# Patient Record
Sex: FEMALE | Race: BLACK OR AFRICAN AMERICAN | Employment: OTHER | URBAN - METROPOLITAN AREA
[De-identification: names, ages, dates, MRNs, and addresses within clinical notes are randomized per-mention and may not be internally consistent; named-entity substitution may affect disease eponyms.]

---

## 2017-08-02 ENCOUNTER — OFFICE VISIT (OUTPATIENT)
Dept: SURGERY | Age: 57
End: 2017-08-02

## 2017-08-02 VITALS
SYSTOLIC BLOOD PRESSURE: 136 MMHG | DIASTOLIC BLOOD PRESSURE: 86 MMHG | WEIGHT: 211 LBS | HEART RATE: 74 BPM | HEIGHT: 64 IN | RESPIRATION RATE: 16 BRPM | BODY MASS INDEX: 36.02 KG/M2 | OXYGEN SATURATION: 100 %

## 2017-08-02 DIAGNOSIS — K90.9 INTESTINAL MALABSORPTION, UNSPECIFIED TYPE: ICD-10-CM

## 2017-08-02 DIAGNOSIS — Z87.891 HX OF SMOKING: ICD-10-CM

## 2017-08-02 DIAGNOSIS — I10 ESSENTIAL HYPERTENSION: ICD-10-CM

## 2017-08-02 DIAGNOSIS — I25.119 CORONARY ARTERY DISEASE INVOLVING NATIVE HEART WITH ANGINA PECTORIS, UNSPECIFIED VESSEL OR LESION TYPE (HCC): ICD-10-CM

## 2017-08-02 DIAGNOSIS — E66.01 SEVERE OBESITY (BMI 35.0-39.9) WITH COMORBIDITY (HCC): ICD-10-CM

## 2017-08-02 DIAGNOSIS — E78.00 HYPERCHOLESTEREMIA: ICD-10-CM

## 2017-08-02 DIAGNOSIS — E66.01 MORBID OBESITY DUE TO EXCESS CALORIES (HCC): Primary | ICD-10-CM

## 2017-08-02 RX ORDER — CARVEDILOL PHOSPHATE 10 MG/1
CAPSULE, EXTENDED RELEASE ORAL
COMMUNITY
End: 2019-10-08

## 2017-08-02 RX ORDER — NAPROXEN 500 MG/1
500 TABLET ORAL 2 TIMES DAILY WITH MEALS
COMMUNITY
End: 2021-11-10

## 2017-08-02 RX ORDER — DEXLANSOPRAZOLE 30 MG/1
30 CAPSULE, DELAYED RELEASE ORAL
COMMUNITY
End: 2017-11-15

## 2017-08-02 RX ORDER — HYDROXYZINE 25 MG/1
25 TABLET, FILM COATED ORAL
COMMUNITY

## 2017-08-02 NOTE — PROGRESS NOTES
Revision Surgery Consultation    Subjective: The patient is a 62 y.o. obese female with a Body mass index is 36.22 kg/(m^2). Keagan Tai The patient had a Sleeve gastrectomy procedure done approximatly 4 years ago in [facility] by me.  her starting weight prior to surgery was 250. she ultimately lost approximately 60 lbs with a subsequent weight regain of 40lbs. her peak EBWL at 2 years out from surgery was 49% and now her current EBWL is 33%. her last bariatric follow-up was 3 years ago with [facility] Mana Tucker notes that she had no issues in the immediate post-op phase and had no hospital readmissions in the remote post-op phase. she currently is having the following issues related to his health:Weight regain. she is here today to discuss a possible revision of her surgery. All of their prior evaluations available by both their PCP's and specialists physicians have been reviewed today either in the Care Everywhere portal or scanned under the media tab. I have spent a large portion of my initial consultation today reviewing the patients current dietary habits which have contributed to their health issues, weight regain and  their current obesity. They understand that generally speaking,  weight regain is  a function of resuming less that ideal dietary habits instead of being a procedural issue. They understand that older procedures are more likely to be associated with a less that perfect procedural result, such as a prior vertical banded gastroplasty or non divided gastric bypass. These procedures are more likely to result in staple line failures with resultant weight regain. This has been explained to the patient via diagrams of these older procedures and given to the patient. I have suggested to them personally a dietary regimen that they can initiate now to help with their status as it pertains to their weight.   They understand that the most important aspect of their journey through their weight loss endeavor will be their adherence to a new lifestyle of healthy eating behavior. They also understand that an adherence to an exercise program will not only help with weight loss but is ultimately important in weight maintenance.       Patient Active Problem List    Diagnosis Date Noted    UTI (lower urinary tract infection) 2015    Osteoarthritis 2015    Hypokalemia 2015    Urinary retention 2015    [facility]racture of distal femur ([facility] [address] 2015    Intestinal malabsorption 2014    H. pylori infection 2014    Chronic gastritis 2014    Morbid obesity ([facility] [address]     Hypertension     Hypercholesteremia     Hx of smoking     Migraine     Depression     Arthritic-like pain     CAD (coronary artery disease)     Preop cardiovascular exam 2011    Abnormal EKG 2011      Past Surgical History:   Procedure Laterality Date    DELIVERY       HX CHOLECYSTECTOMY      laparoscopic    HX CYST INCISION AND DRAINAGE      HX FEMUR FRACTURE TX      HX GI  2013    sleeve resection    HX KNEE ARTHROSCOPY      right    HX KNEE REPLACEMENT      bilateral    HX ORTHOPAEDIC      left knee revision      Social History   Substance Use Topics    Smoking status: Former Smoker     Packs/day: 0.25     Years: 35.00     Types: Cigarettes     Quit date: 1/10/2017    Smokeless tobacco: Never Used      Comment: states she smokes e-cigarettes    Alcohol use 0.5 oz/week     1 Standard drinks or equivalent per week      Family History   Problem Relation Age of Onset    Heart Disease Mother      Mom was told she had irreg heart beat/hole in [place], [place] Breast Cancer Mother     Other Other      Non- contributory    Breast Cancer Maternal Grandmother     Malignant Hyperthermia Neg Hx     Pseudocholinesterase Deficiency Neg Hx     Delayed Awakening Neg Hx     Post-op Nausea/Vomiting Neg Hx     Emergence Delirium Neg Hx     Post-op Cognitive Dysfunction Neg Hx       Current Outpatient Prescriptions   Medication Sig Dispense Refill    dexlansoprazole (DEXILANT) 30 mg capsule Take 30 mg by mouth Daily (before breakfast).  carvedilol (COREG CR) 10 mg CR capsule Take  by mouth daily (with breakfast).  hydrOXYzine HCl (ATARAX) 25 mg tablet Take  by mouth three (3) times daily as needed for Itching.  naproxen (NAPROSYN) 500 mg tablet Take 500 mg by mouth two (2) times daily (with meals).  brexpiprazole (REXULTI) 2 mg tab tablet Take  by mouth daily.  AMITRIPTYLINE HCL (AMITRIPTYLINE PO) Take  by mouth.  mometasone (NASONEX) 50 mcg/actuation nasal spray 2 Sprays daily.  vilazodone (VIIBRYD) 40 mg tab tablet Take  by mouth daily.  gabapentin (NEURONTIN) 300 mg capsule Take 1 Cap by mouth three (3) times daily. 90 Cap 0    melatonin (MELATONIN) 5 mg cap capsule Take 10 mg by mouth nightly.  amLODIPine (NORVASC) 5 mg tablet Take 1 Tab by mouth daily. 60 Tab 0    methocarbamol (ROBAXIN) 500 mg tablet Take 1 Tab by mouth four (4) times daily. 20 Tab 0    lurasidone (LATUDA) 120 mg tab tablet Take  by mouth.  traZODone (DESYREL) 100 mg tablet Take 100 mg by mouth nightly.        Allergies   Allergen Reactions    Percocet [Oxycodone-Acetaminophen] Itching          Review of Systems:            General - No history or complaints of unexpected fever, chills, or weight loss  Head/Neck - No history or complaints of headache, diplopia, dysphagia, hearing loss  Cardiac - No history or complaints of chest pain, palpitations, murmur, or shortness of breath  Pulmonary - No history or complaints of shortness of breath, productive cough, hemoptysis  Gastrointestinal - No history or complaints of reflux,  abdominal pain, obstipation/constipation, blood per rectum  Genitourinary - No history or complaints of hematuria/dysuria, stress urinary incontinence symptoms, or renal lithiasis  Musculoskeletal - No history or complaints of joint pain or muscular weakness  Hematologic - No history or complaints of bleeding disorders, blood transfusions, sickle cell anemia  Neurologic - No history or complaints of  migraine headaches, seizure activity, syncopal episodes, TIA or stroke  Integumentary - No history or complaints of rashes, abnormal nevi, skin cancer  Gynecological - No history of heavy menses/abnormal menses           Objective:     Visit Vitals    /86 (BP 1 Location: Left arm, BP Patient Position: Sitting)    Pulse 74    Resp 16    Ht 5' 4\" (1.626 m)    Wt 95.7 kg (211 lb)    SpO2 100%    BMI 36.22 kg/m2       Physical Examination: General appearance - alert, well appearing, and in no distress and oriented to person, place, and time  Mental status - alert, oriented to person, place, and time, normal mood, behavior, speech, dress, motor activity, and thought processes  Eyes - left eye normal, right eye normal  Ears - right ear normal, left ear normal  Nose - normal and patent, no erythema, discharge or polyps  Mouth - mucous membranes moist, pharynx normal without lesions  Neck - supple, no significant adenopathy  Lymphatics - no palpable lymphadenopathy, no hepatosplenomegaly  Chest - clear to auscultation, no wheezes, rales or rhonchi, symmetric air entry  Heart - normal rate, regular rhythm, normal S1, S2, no murmurs, rubs, clicks or gallops  Abdomen - soft, nontender, nondistended, no masses or organomegaly  Back exam - full range of motion, no tenderness, palpable spasm or pain on motion  Neurological - alert, oriented, normal speech, no focal findings or movement disorder noted  Musculoskeletal - no joint tenderness, deformity or swelling  Extremities - peripheral pulses normal, no pedal edema, no clubbing or cyanosis  Skin - normal coloration and turgor, no rashes, no suspicious skin lesions noted    Labs / Old Records:     Lab Results   Component Value Date/Time WBC 4.4 08/31/2016 10:16 PM    HGB 11.4 08/31/2016 10:16 PM    HCT 33.6 08/31/2016 10:16 PM    PLATELET 644 58/97/0607 10:16 PM    MCV 80.6 08/31/2016 10:16 PM     Lab Results   Component Value Date/Time    Sodium 142 08/31/2016 10:16 PM    Potassium 3.3 08/31/2016 10:16 PM    Chloride 106 08/31/2016 10:16 PM    CO2 31 08/31/2016 10:16 PM    Anion gap 5 08/31/2016 10:16 PM    Glucose 100 08/31/2016 10:16 PM    BUN 12 08/31/2016 10:16 PM    Creatinine 0.60 08/31/2016 10:16 PM    BUN/Creatinine ratio 20 08/31/2016 10:16 PM    GFR est AA >60 08/31/2016 10:16 PM    GFR est non-AA >60 08/31/2016 10:16 PM    Calcium 8.2 08/31/2016 10:16 PM    Bilirubin, total 0.2 08/31/2016 10:16 PM    AST (SGOT) 13 08/31/2016 10:16 PM    Alk. phosphatase 56 08/31/2016 10:16 PM    Protein, total 7.1 08/31/2016 10:16 PM    Albumin 3.2 08/31/2016 10:16 PM    Globulin 3.9 08/31/2016 10:16 PM    A-G Ratio 0.8 08/31/2016 10:16 PM    ALT (SGPT) 14 08/31/2016 10:16 PM     No results found for: IRON, FE, TIBC, IBCT, PSAT, FERR  No results found for: FOL, RBCF  Lab Results   Component Value Date/Time    Vitamin D 25-Hydroxy 16.3 05/27/2016 12:09 PM             Old operative reports reviewed if available and are scanned under the media tab or reviewed under Care Everywhere        Assessment:     Morbid obesity status post   Sleeve gastrectomy procedure approximately 4.5 years ago with complaint of weight regain. Plan: 1. Weight regain-Today in our office I had a lengthy discussion with Jose Roberto Banks regarding the nature of their prior procedure. We discussed the anatomical changes to their anatomy and how this relates to  contributing weight regain. Our office will continue to attempt to obtain any medical records related to their procedure if we were not able to obtain theme today.   It was also discussed today that before any decisions can be made regarding a possible revision of their initial  procedure that an upper GI swallow study must be obtained to evaluate their post surgical anatomy. They understand that the majority of bariatric patients are not revision candidates due to appropriate post surgical anatomy that can not be improved upon. They understand also that if their initial procedure was performed via an open technique that this alone complicates their situation immensely. They understand, as I have explained today, that the adhesive disease associated with prior open procedures is at times a rate limiting factor. This precludes our ability to perform a revision procedure safely. The factors that contribute to this are increased risk such as age, health issues and increased risk from a procedural standpoint and have been discussed today. We will proceed with the UGI swallow study as described above. The patient understands all of the above and wishes to proceed with the study. 2.Nutrition-  I have discussed in detail the pitfalls in diet that have contributed to their weight regain and the importance of adhering to a lifelong regimen of dietary goals and proper eating habits. I have discussed the proper lifelong bariatric diet  in detail spending in excess of 20 minutes discussing this. We will schedule them for a dietary consultation with our nutritionist and urge them to continue on a regular follow-up schedule with her. 3.Maintenance vitamins- Today we have discussed the importance of vitamins as it pertains to their procedure and we will obtain appropriate lab to check all levels. They have been provided a handout regarding this today. Total time spent with the patient reviewing their complex history of bariatric surgery,diet, and plan is in excess of 60 minutes.       Secondary Diagnoses:         Signed By: Mel Beyer MD     August 2, 2017

## 2017-08-02 NOTE — PATIENT INSTRUCTIONS
Body Mass Index: Care Instructions  Your Care Instructions    Body mass index (BMI) can help you see if your weight is raising your risk for health problems. It uses a formula to compare how much you weigh with how tall you are. · A BMI lower than 18.5 is considered underweight. · A BMI between 18.5 and 24.9 is considered healthy. · A BMI between 25 and 29.9 is considered overweight. A BMI of 30 or higher is considered obese. If your BMI is in the normal range, it means that you have a lower risk for weight-related health problems. If your BMI is in the overweight or obese range, you may be at increased risk for weight-related health problems, such as high blood pressure, heart disease, stroke, arthritis or joint pain, and diabetes. If your BMI is in the underweight range, you may be at increased risk for health problems such as fatigue, lower protection (immunity) against illness, muscle loss, bone loss, hair loss, and hormone problems. BMI is just one measure of your risk for weight-related health problems. You may be at higher risk for health problems if you are not active, you eat an unhealthy diet, or you drink too much alcohol or use tobacco products. Follow-up care is a key part of your treatment and safety. Be sure to make and go to all appointments, and call your doctor if you are having problems. It's also a good idea to know your test results and keep a list of the medicines you take. How can you care for yourself at home? · Practice healthy eating habits. This includes eating plenty of fruits, vegetables, whole grains, lean protein, and low-fat dairy. · If your doctor recommends it, get more exercise. Walking is a good choice. Bit by bit, increase the amount you walk every day. Try for at least 30 minutes on most days of the week. · Do not smoke. Smoking can increase your risk for health problems. If you need help quitting, talk to your doctor about stop-smoking programs and medicines. These can increase your chances of quitting for good. · Limit alcohol to 2 drinks a day for men and 1 drink a day for women. Too much alcohol can cause health problems. If you have a BMI higher than 25  · Your doctor may do other tests to check your risk for weight-related health problems. This may include measuring the distance around your waist. A waist measurement of more than 40 inches in men or 35 inches in women can increase the risk of weight-related health problems. · Talk with your doctor about steps you can take to stay healthy or improve your health. You may need to make lifestyle changes to lose weight and stay healthy, such as changing your diet and getting regular exercise. If you have a BMI lower than 18.5  · Your doctor may do other tests to check your risk for health problems. · Talk with your doctor about steps you can take to stay healthy or improve your health. You may need to make lifestyle changes to gain or maintain weight and stay healthy, such as getting more healthy foods in your diet and doing exercises to build muscle. Where can you learn more? Go to http://alok-shashank.info/. Enter S176 in the search box to learn more about \"Body Mass Index: Care Instructions. \"  Current as of: January 23, 2017  Content Version: 11.3  © 8299-9670 [employer]. Care instructions adapted under license by [employer] (which disclaims liability or warranty for this information). If you have questions about a medical condition or this instruction, always ask your healthcare professional. [place] [address] any warranty or liability for your use of this information. Patient Instructions      1. Continue to monitor carbohydrate and protein intake- remember to keep your           total  carbohydrates to 50 grams or less per day for best results.   2. Remember hydration goals - usually 48 to 64 ounces of liquids per day  3. Continue to work towards exercise goals - minimum 3 days per week of 45          minutes to  1 hour at a time. 4. Remember to take vitamins as directed        Supplement Resource Guide    Importance of Protein:   Maintains lean body mass, produces antibodies to fight off infections, heals wounds, minimizes hair loss, helps to give you energy, helps with satiety, and keeping you full between meals. Importance of Calcium:  Needed for healthy bones and teeth, normal blood clotting, and nervous system functioning, higher risk of osteoporosis and bone disease with non-compliance. Importance of Multivitamins: Many functions. Supply you with extra nutrients that you may be missing from food. May lead to iron deficiency anemia, weakness, fatigue, and many other symptoms with non-compliance. Importance of B Vitamins:  Important for red blood cell formation, metabolism, energy, and helps to maintain a healthy nervous system. Protein Supplement  Find one you like now. Use immediately after surgery. Look for:  35-50g protein each day from your protein supplement once you reach the progression diet. 0-3 g fat per serving  0-3 g sugar per serving    Protein drinks should be split in separate dosages. Recommend: Lifelong  1 year + Calcium Supplement:     Start taking within a month after surgery. Look for: Calcium Citrate Plus D (1500 mg per day)  Recommend: Citracal     .          Avoid chocolate chewable calcium. Can use chewable bariatric or [employer] brand or similar chewable. The body cannot absorb more than 500-600 mg @ a time. Take for Life Multi-vitamin Supplement:      1st Month After Surgery: Any complete chewable, such as: Eries Complete chewables. Avoid Erie sours or gummies. They lack iron and other important nutrients and also have added sugar.     Continue with chewable vitamin or change to adult complete multivitamin one month after surgery. Menstruating women can take a prenatal vitamin. Make sure has at least 18 mg iron and [id number] mcg folic acid):    Vitamin B12, B Complex Vitamin, and Biotin  Start taking within a month after surgery. Vitamin B12:  1000 mcg of Vitamin B12 three times weekly    Must take sublingually (meaning you take it under your tongue) or in a liquid drop form for easy absorption. B Complex Vitamin: Take a pill or liquid drop form once daily. Biotin: This vitamin can help prevent hair loss.     Recommend 5mg   (5000 mcg) a day  Biotin is Optional

## 2017-08-02 NOTE — MR AVS SNAPSHOT
Visit Information Date & Time Provider Department Dept. Phone Encounter #  
 8/2/2017 11:30 AM Leanna Sher MD [facility] [phone] [id number] Follow-up Instructions Follow-up and Disposition History Your Appointments 8/14/2017  8:30 AM  
NUTRITION COUNSELING with TSS NUTR VISIT2 [facility] [place] ([address]) Appt Note: 1:6  
 [facility] [address] [facility] [address]  
[phone]  
  
   
 [address] [place] [address]  
  
    
 9/13/2017  1:30 PM  
NUTRITION COUNSELING with TSS NUTR VISIT2 [facility] ([address]) Appt Note: 2:6  
 [facility] [address] [facility] [address]  
[phone]  
  
    
 10/16/2017 10:30 AM  
NUTRITION COUNSELING with TSS NUTR VISIT2 [facility] ([address]) Appt Note: 3:6  
 [facility] [address] [facility] [address]  
[phone]  
  
    
 11/13/2017 10:30 AM  
NUTRITION COUNSELING with TSS NUTR VISIT2 [facility] ([address]) Appt Note: 4:6  
 [facility] [address] [facility] [address]  
[phone]  
  
    
 12/11/2017 10:30 AM  
NUTRITION COUNSELING with TSS NUTR VISIT2 [facility] ([address]) Appt Note: 5:6  
 [facility] [address] [facility]  
[phone] Upcoming Health Maintenance Date Due Hepatitis C Screening 1960 Pneumococcal 19-64 Medium Risk (1 of 1 - PPSV23) 1/26/1979 DTaP/Tdap/Td series (1 - Tdap) 1/26/1981 PAP AKA CERVICAL CYTOLOGY 1/26/1981 FOBT Q 1 YEAR AGE 50-75 1/26/2010 BREAST CANCER SCRN MAMMOGRAM 9/3/2016 INFLUENZA AGE 9 TO ADULT 8/1/2017 Allergies as of 8/2/2017  Review Complete On: 8/2/2017 By: Leanna Sher MD  
  
 Severity Noted Reaction Type Reactions Percocet [Oxycodone-acetaminophen]  06/10/2013    Itching Current Immunizations  Never Reviewed No immunizations on file. Not reviewed this visit You Were Diagnosed With   
  
 Codes Comments Morbid obesity due to excess calories ([facility]    -  Primary ICD-10-CM: E66.01 
ICD-9-CM: 278.01 Intestinal malabsorption, unspecified type     ICD-10-CM: K90.9 ICD-9-CM: 579.9 Essential hypertension     ICD-10-CM: I10 
ICD-9-CM: 401.9 Hypercholesteremia     ICD-10-CM: E78.00 ICD-9-CM: 272.0 Coronary artery disease involving native heart with angina pectoris, unspecified vessel or lesion type [facility])     ICD-10-CM: I25.119 ICD-9-CM: 414.01, 413.9 Hx of smoking     ICD-10-CM: Z87.891 ICD-9-CM: V15.82 Severe obesity (BMI 35.0-39. 9) with comorbidity ([facility]     ICD-10-CM: E66.01 
ICD-9-CM: 278.01 Vitals BP Pulse Resp Height(growth percentile) Weight(growth percentile) SpO2  
 136/86 (BP 1 Location: Left arm, BP Patient Position: Sitting) 74 16 5' 4\" (1.626 m) 211 lb (95.7 kg) 100% BMI OB Status Smoking Status 36.22 kg/m2 Menopause Former Smoker Vitals History BMI and BSA Data Body Mass Index Body Surface Area  
 36.22 kg/m 2 2.08 m 2 Preferred Pharmacy Pharmacy Name Phone [id number]- [place], [address] [place] [address] Your Updated Medication List  
  
   
This list is accurate as of: 8/2/17 12:42 PM.  Always use your most recent med list.  
  
  
  
  
 AMITRIPTYLINE PO Take  by mouth. amLODIPine 5 mg tablet Commonly known as:  Tadeo Richardson Take 1 Tab by mouth daily. COREG CR 10 mg CR capsule Generic drug:  carvedilol Take  by mouth daily (with breakfast). DEXILANT 30 mg capsule Generic drug:  dexlansoprazole Take 30 mg by mouth Daily (before breakfast). gabapentin 300 mg capsule Commonly known as:  NEURONTIN  
 Take 1 Cap by mouth three (3) times daily. hydrOXYzine HCl 25 mg tablet Commonly known as:  ATARAX Take  by mouth three (3) times daily as needed for Itching. lurasidone 120 mg Tab tablet Commonly known as:  Katherine [place] Take  by mouth.  
  
 melatonin 5 mg Cap capsule Take 10 mg by mouth nightly. methocarbamol 500 mg tablet Commonly known as:  ROBAXIN Take 1 Tab by mouth four (4) times daily. mometasone 50 mcg/actuation nasal spray Commonly known as:  NASONEX  
2 Sprays daily. naproxen 500 mg tablet Commonly known as:  NAPROSYN Take 500 mg by mouth two (2) times daily (with meals). REXULTI 2 mg Tab tablet Generic drug:  brexpiprazole Take  by mouth daily. traZODone 100 mg tablet Commonly known as:  [place] Take 100 mg by mouth nightly. vilazodone 40 mg Tab tablet Commonly known as:  VIIBRYD Take  by mouth daily. To-Do List   
 08/02/2017 Lab:  VITAMIN D, 25 HYDROXY   
  
 10/01/2017 Lab:  CBC WITH AUTOMATED DIFF   
  
 10/01/2017 Lab:  FERRITIN   
  
 10/01/2017 Lab:  IRON   
  
 10/01/2017 Lab:  METABOLIC PANEL, COMPREHENSIVE   
  
 10/01/2017 Lab:  VITAMIN B1, WHOLE BLOOD   
  
 10/01/2017 Lab:  VITAMIN B12 & FOLATE Patient Instructions Body Mass Index: Care Instructions Your Care Instructions Body mass index (BMI) can help you see if your weight is raising your risk for health problems. It uses a formula to compare how much you weigh with how tall you are. · A BMI lower than 18.5 is considered underweight. · A BMI between 18.5 and 24.9 is considered healthy. · A BMI between 25 and 29.9 is considered overweight. A BMI of 30 or higher is considered obese. If your BMI is in the normal range, it means that you have a lower risk for weight-related health problems.  If your BMI is in the overweight or obese range, you may be at increased risk for weight-related health problems, such as high blood pressure, heart disease, stroke, arthritis or joint pain, and diabetes. If your BMI is in the underweight range, you may be at increased risk for health problems such as fatigue, lower protection (immunity) against illness, muscle loss, bone loss, hair loss, and hormone problems. BMI is just one measure of your risk for weight-related health problems. You may be at higher risk for health problems if you are not active, you eat an unhealthy diet, or you drink too much alcohol or use tobacco products. Follow-up care is a key part of your treatment and safety. Be sure to make and go to all appointments, and call your doctor if you are having problems. It's also a good idea to know your test results and keep a list of the medicines you take. How can you care for yourself at home? · Practice healthy eating habits. This includes eating plenty of fruits, vegetables, whole grains, lean protein, and low-fat dairy. · If your doctor recommends it, get more exercise. Walking is a good choice. Bit by bit, increase the amount you walk every day. Try for at least 30 minutes on most days of the week. · Do not smoke. Smoking can increase your risk for health problems. If you need help quitting, talk to your doctor about stop-smoking programs and medicines. These can increase your chances of quitting for good. · Limit alcohol to 2 drinks a day for men and 1 drink a day for women. Too much alcohol can cause health problems. If you have a BMI higher than 25 · Your doctor may do other tests to check your risk for weight-related health problems. This may include measuring the distance around your waist. A waist measurement of more than 40 inches in men or 35 inches in women can increase the risk of weight-related health problems. · Talk with your doctor about steps you can take to stay healthy or improve your health.  You may need to make lifestyle changes to lose weight and stay healthy, such as changing your diet and getting regular exercise. If you have a BMI lower than 18.5 · Your doctor may do other tests to check your risk for health problems. · Talk with your doctor about steps you can take to stay healthy or improve your health. You may need to make lifestyle changes to gain or maintain weight and stay healthy, such as getting more healthy foods in your diet and doing exercises to build muscle. Where can you learn more? Go to http://alok-shashank.info/. Enter S176 in the search box to learn more about \"Body Mass Index: Care Instructions. \" Current as of: January 23, 2017 Content Version: 11.3 © 5702-6537 [employer]. Care instructions adapted under license by [employer] (which disclaims liability or warranty for this information). If you have questions about a medical condition or this instruction, always ask your healthcare professional. [place] [address] any warranty or liability for your use of this information. Patient Instructions 1. Continue to monitor carbohydrate and protein intake- remember to keep your           total  carbohydrates to 50 grams or less per day for best results. 2. Remember hydration goals - usually 48 to 64 ounces of liquids per day 3. Continue to work towards exercise goals - minimum 3 days per week of 45          minutes to  1 hour at a time. 4. Remember to take vitamins as directed Supplement Resource Guide Importance of Protein:  
Maintains lean body mass, produces antibodies to fight off infections, heals wounds, minimizes hair loss, helps to give you energy, helps with satiety, and keeping you full between meals. Importance of Calcium: 
Needed for healthy bones and teeth, normal blood clotting, and nervous system functioning, higher risk of osteoporosis and bone disease with non-compliance. Importance of Multivitamins: Many functions. Supply you with extra nutrients that you may be missing from food. May lead to iron deficiency anemia, weakness, fatigue, and many other symptoms with non-compliance. Importance of B Vitamins: 
Important for red blood cell formation, metabolism, energy, and helps to maintain a healthy nervous system. Protein Supplement Find one you like now. Use immediately after surgery. Look for: 
35-50g protein each day from your protein supplement once you reach the progression diet. 0-3 g fat per serving 0-3 g sugar per serving Protein drinks should be split in separate dosages. Recommend: Lifelong 1 year + Calcium Supplement:  
 
Start taking within a month after surgery. Look for: Calcium Citrate Plus D (1500 mg per day) Recommend: Citracal 
 
 . Avoid chocolate chewable calcium. Can use chewable bariatric or [employer] brand or similar chewable. The body cannot absorb more than 500-600 mg @ a time. Take for Life Multi-vitamin Supplement:   
 
1st Month After Surgery: Any complete chewable, such as: [employer]s Complete chewables. Avoid [employer] sours or gummies. They lack iron and other important nutrients and also have added sugar. Continue with chewable vitamin or change to adult complete multivitamin one month after surgery. Menstruating women can take a prenatal vitamin. Make sure has at least 18 mg iron and 884-739 mcg folic acid): Vitamin B12, B Complex Vitamin, and Biotin Start taking within a month after surgery. Vitamin B12:  1000 mcg of Vitamin B12 three times weekly Must take sublingually (meaning you take it under your tongue) or in a liquid drop form for easy absorption. B Complex Vitamin: Take a pill or liquid drop form once daily. Biotin: This vitamin can help prevent hair loss. Recommend 5mg  
(5000 mcg) a day Biotin is Optional  
 
 
 
 
 
 Patient Instructions History Introducing [facility]! [employer] introduces [email or web address] patient portal. Now you can access parts of your medical record, email your doctor's office, and request medication refills online. 1. In your internet browser, go to [email or web address]. [address]/[employer] 2. Click on the First Time User? Click Here link in the Sign In box. You will see the New Member Sign Up page. 3. Enter your [email or web address] Access Code exactly as it appears below. You will not need to use this code after youve completed the sign-up process. If you do not sign up before the expiration date, you must request a new code. · [email or web address] Access Code: [email or web address] Expires: 10/31/2017 12:36 PM 
 
4. Enter the last four digits of your Social Security Number (xxxx) and Date of Birth (mm/dd/yyyy) as indicated and click Submit. You will be taken to the next sign-up page. 5. Create a [email or web address] ID. This will be your [email or web address] login ID and cannot be changed, so think of one that is secure and easy to remember. 6. Create a [email or web address] password. You can change your password at any time. 7. Enter your Password Reset Question and Answer. This can be used at a later time if you forget your password. 8. Enter your e-mail address. You will receive e-mail notification when new information is available in [address] 9. Click Sign Up. You can now view and download portions of your medical record. 10. Click the Download Summary menu link to download a portable copy of your medical information. If you have questions, please visit the Frequently Asked Questions section of the [email or web address] website. Remember, [email or web address] is NOT to be used for urgent needs. For medical emergencies, dial 911. Now available from your iPhone and Android! Please provide this summary of care documentation to your next provider. Your primary care clinician is listed as Phys Other. If you have any questions after today's visit, please call [phone].

## 2017-08-14 ENCOUNTER — CLINICAL SUPPORT (OUTPATIENT)
Dept: SURGERY | Age: 57
End: 2017-08-14

## 2017-08-14 ENCOUNTER — HOSPITAL ENCOUNTER (OUTPATIENT)
Dept: LAB | Age: 57
Discharge: HOME | End: 2017-08-14

## 2017-08-14 VITALS — HEIGHT: 64 IN | WEIGHT: 213 LBS | BODY MASS INDEX: 36.37 KG/M2

## 2017-08-14 DIAGNOSIS — E66.9 OBESITY (BMI 30-39.9): Primary | ICD-10-CM

## 2017-08-14 PROCEDURE — 99001 SPECIMEN HANDLING PT-LAB: CPT | Performed by: SPECIALIST

## 2017-08-14 NOTE — PATIENT INSTRUCTIONS
Goals: 1. Eat three balanced meals per day; no skipping meals - can use a protein shake as a meal replacement (eat within 2 hours of waking up)  2. Start using the Premier Protein shake as a meal replacement or snack (can drink up to 2 per day)  3.  Find high protein low carb snack options that you can keep with you in case you can't get to the kitchen when you're hungry

## 2017-08-14 NOTE — PROGRESS NOTES
Medical Weight Loss Multi-Disciplinary Program    Name: Dontrell Agustin   : 1960    Session# 1  Date: 2017     Height: 5' 4\" (162.6 cm)    Weight: 96.6 kg (213 lb) lbs. Body mass index is 36.56 kg/(m^2). Pounds Gained: 2    Dietary Instructions    Reviewed intake  Understanding label reading  Understanding low carbohydrates, low sugar, higher protein meals  Understanding proper portions  Dining outside home  Instruction given for personal dietary changes  Discussed perceived compliance  Comments: Pt given brief pre/post-op diet ed and diet hx reviewed. Physical Activity/Exercise    Discussed Perceived Compliance  Reasonable Goals Set  Motivation  Comments: Pt normally walks Monday through Friday for about 30 minutes     Behavior Modification    Positive attitude  Comments: Pt is working on the following go: 1. Eat three balanced meals per day; no skipping meals - can use a protein shake as a meal replacement (eat within 2 hours of waking up)  2. Start using the Premier Protein shake as a meal replacement or snack (can drink up to 2 per day)  3. Find high protein low carb snack options that you can keep with you in case you can't get to the kitchen when you're hungry als:      Candidate for surgery (per RD):Pending     Dietitian: Suzanne Morales is a 62 y.o. female who present for a pre-op evaluation. Visit Vitals    Ht 5' 4\" (1.626 m)    Wt 96.6 kg (213 lb)    BMI 36.56 kg/m2     Past Medical History:   Diagnosis Date    Anemia     Arthritic-like pain     Arthritis     Binge-purge behavior     Pt admits to binge eating and purging 3 times in the last four months.     Bipolar disorder ([facility]     CAD (coronary artery disease) 2004    questionable hx of MI    Depression     Hx of smoking     Hypercholesteremia     Hypertension     Menopause     Migraine     Morbid obesity (HCC)     Postmenopausal     Sickle cell trait ([address] Procedure:  spoke with Dr. Markie Simon about a possible revision of sleeve gastrectomy to gastric bypass     Reasons for Surgery:  BMI>35    Summary:  Pt given brief pre/post-op diet ed and diet hx reviewed. Pt set several goals. See below. Current Vitamins: None     What have you done in the past to try to lose weight? Pt had a sleeve gastrectomy done on 9-10-13    Why didn't you lose weight or keep the weight off?: Pt was able to go from 250# to 185# but over the last year has noticed that the weight has been coming back on a lot faster than she hoped     Why do you think having weight loss surgery will make it possible for you to lose weight and keep it off? Pt states that since she did so well the first time and recently has started to gain weight and feels that if she gets the revision she'll be able to lose the weight again and be able to keep it off this time around. Patient Education and Materials Provided:  Supplement [facility], B Vitamin Information, MVI Recommendations, Calcium Citrate Information, Bariatric Supplement Companies, Protein Supplement Information, Fluid Requirements, No Caffeine or Carbonation, No Alcohol for One Year Post Op, 3 Balanced Meals a Day, Food Group Guide, Good Choices Dining Out, No Snacks, No Concentrated Sweets, Support System at Home, Exercising, Support Group Information and Addressed Current Habits / Changes to make    Nutritional Hx: What is the number of meals you eat per day? 2  Comment: typically skips breakfast because she's not hungry; eats lunch at 2pm (wakes up around 5 am)    Do you eat between meals / snack? yes  Typical snack: she had a problem with cheesitz, but has eliminated those from her diet recently, also eats greek yogurt and SF popsicles      How fast do you eat your meals? slow    How many sodas/sugared beverages do you drink per day? None     How many caffeinated drinks do you have per day?  1-2 cups tea - puts sweet'n'low or regular sugar and creamer     How much water do you drink per day? At least 8 (12 ounce) bottles of water per day    How often do you eat fast food? very rarely     How often do you consume alcohol? 1-2 drinks socially on the weekends times a week; 2-6 champagne glasses throughout the weekend  Mixed drinks    Diet History:  Breakfast  What are you eating and how much? Typically skips   When? Wakes up at 5 am    Where? iii   Snacks  What are you eating and how much? None    When? ii   Where? iii   Hydration  What are you eating and how much? Water and tea with sugar and creamer    When? ii   Where? ii   Lunch  What are you eating and how much? Tuna salad with crackers (5 crackers)    When? ii   Where? iii   Snacks  What are you eating and how much? Yogurt (greek)    When? ii   Where? iii   Hydration  What are you eating and how much? water   When? ii   Where? iii   Dinner  What are you eating and how much? Only eats shrimp, chicken, fish and turkey and a vegetable - no starches    When? ii   Where? iii   Snacks  What are you eating and how much? Not normally    When? ii   Where? iii   Hydration  What are you eating and how much? water   When? ii   Where? iii     Exercise:  Do you currently have an exercise routine? yes  What kind of exercise do you do? normall walks in the morning  . For how long? 30 minutes For how often? 5 days per week    Goals: 1. Eat three balanced meals per day; no skipping meals - can use a protein shake as a meal replacement (eat within 2 hours of waking up)  2. Start using the Premier Protein shake as a meal replacement or snack (can drink up to 2 per day)  3.  Find high protein low carb snack options that you can keep with you in case you can't get to the kitchen when you're hungry

## 2017-08-17 LAB
25(OH)D3+25(OH)D2 SERPL-MCNC: 16.2 NG/ML (ref 30–100)
ALBUMIN SERPL-MCNC: 3.9 G/DL (ref 3.5–5.5)
ALBUMIN/GLOB SERPL: 1.1 {RATIO} (ref 1.2–2.2)
ALP SERPL-CCNC: 73 IU/L (ref 39–117)
ALT SERPL-CCNC: 6 IU/L (ref 0–32)
AST SERPL-CCNC: 9 IU/L (ref 0–40)
BASOPHILS # BLD AUTO: 0 X10E3/UL (ref 0–0.2)
BASOPHILS NFR BLD AUTO: 0 %
BILIRUB SERPL-MCNC: <0.2 MG/DL (ref 0–1.2)
BUN SERPL-MCNC: 13 MG/DL (ref 6–24)
BUN/CREAT SERPL: 31 (ref 9–23)
CALCIUM SERPL-MCNC: 8.9 MG/DL (ref 8.7–10.2)
CHLORIDE SERPL-SCNC: 102 MMOL/L (ref 96–106)
CO2 SERPL-SCNC: 21 MMOL/L (ref 18–29)
CREAT SERPL-MCNC: 0.42 MG/DL (ref 0.57–1)
EOSINOPHIL # BLD AUTO: 0.1 X10E3/UL (ref 0–0.4)
EOSINOPHIL NFR BLD AUTO: 1 %
ERYTHROCYTE [DISTWIDTH] IN BLOOD BY AUTOMATED COUNT: 17.7 % (ref 12.3–15.4)
FERRITIN SERPL-MCNC: 12 NG/ML (ref 15–150)
FOLATE SERPL-MCNC: 5.4 NG/ML
GLOBULIN SER CALC-MCNC: 3.4 G/DL (ref 1.5–4.5)
GLUCOSE SERPL-MCNC: 70 MG/DL (ref 65–99)
HCT VFR BLD AUTO: 37.4 % (ref 34–46.6)
HGB BLD-MCNC: 11.4 G/DL (ref 11.1–15.9)
IMM GRANULOCYTES # BLD: 0 X10E3/UL (ref 0–0.1)
IMM GRANULOCYTES NFR BLD: 0 %
IRON SERPL-MCNC: 36 UG/DL (ref 27–159)
LYMPHOCYTES # BLD AUTO: 2.7 X10E3/UL (ref 0.7–3.1)
LYMPHOCYTES NFR BLD AUTO: 60 %
MCH RBC QN AUTO: 24.8 PG (ref 26.6–33)
MCHC RBC AUTO-ENTMCNC: 30.5 G/DL (ref 31.5–35.7)
MCV RBC AUTO: 82 FL (ref 79–97)
MONOCYTES # BLD AUTO: 0.4 X10E3/UL (ref 0.1–0.9)
MONOCYTES NFR BLD AUTO: 10 %
NEUTROPHILS # BLD AUTO: 1.3 X10E3/UL (ref 1.4–7)
NEUTROPHILS NFR BLD AUTO: 29 %
PLATELET # BLD AUTO: 272 X10E3/UL (ref 150–379)
POTASSIUM SERPL-SCNC: 4.3 MMOL/L (ref 3.5–5.2)
PROT SERPL-MCNC: 7.3 G/DL (ref 6–8.5)
RBC # BLD AUTO: 4.59 X10E6/UL (ref 3.77–5.28)
SODIUM SERPL-SCNC: 138 MMOL/L (ref 134–144)
VIT B1 BLD-SCNC: 89.7 NMOL/L (ref 66.5–200)
VIT B12 SERPL-MCNC: 306 PG/ML (ref 211–946)
WBC # BLD AUTO: 4.6 X10E3/UL (ref 3.4–10.8)

## 2017-08-22 ENCOUNTER — TELEPHONE (OUTPATIENT)
Dept: SURGERY | Age: 57
End: 2017-08-22

## 2017-08-22 RX ORDER — ERGOCALCIFEROL 1.25 MG/1
50000 CAPSULE ORAL 2 TIMES WEEKLY
Qty: 52 CAP | Refills: 1 | Status: SHIPPED | OUTPATIENT
Start: 2017-08-25 | End: 2017-11-15

## 2017-08-22 NOTE — TELEPHONE ENCOUNTER
----- Message from Myriam Blackburn MD sent at 8/18/2017 11:56 AM EDT -----  Call in Vitamin D 50,000 units twice weekly for 1 year

## 2017-09-01 ENCOUNTER — TELEPHONE (OUTPATIENT)
Dept: SURGERY | Age: 57
End: 2017-09-01

## 2017-09-01 NOTE — TELEPHONE ENCOUNTER
Patient called to ask if she can eat cabbage and greens. Patient advised to have food if there are no current medication that would prevent them. Pt verbalized understanding.

## 2017-10-01 DIAGNOSIS — K90.9 INTESTINAL MALABSORPTION, UNSPECIFIED TYPE: ICD-10-CM

## 2017-10-30 ENCOUNTER — CLINICAL SUPPORT (OUTPATIENT)
Dept: SURGERY | Age: 57
End: 2017-10-30

## 2017-10-30 VITALS — BODY MASS INDEX: 34.49 KG/M2 | HEIGHT: 64 IN | WEIGHT: 202 LBS

## 2017-10-30 DIAGNOSIS — Z98.84 S/P BARIATRIC SURGERY: Primary | ICD-10-CM

## 2017-10-30 NOTE — PATIENT INSTRUCTIONS
Goals: 1. Work on eating three meals per day - even if they're small meals - no skipping  2. Find a protein shake (premier) that you can use as a meal replacement (breakfast) or snack - can drink cup of coffee at 6 am and then premier shake at 9 am -- or can mix the two together --> coffee does not count as a meal   3.  Can have 10-13 grapes a day is okay - continue using the sugar free popsicles and the greek yogurt

## 2017-10-30 NOTE — PROGRESS NOTES
Cong Huynh is a 62 y.o. female who is 4.1 years s/p sleeve gastrectomy. The patient has lost 48 lbs since surgery. Body mass index is 34.67 kg/(m^2). The patient has lost 40% EBW. Pt is here to get BOT with her weight loss. She does not currently want a revision and wants to lose the weight on her own. Has gained back her weight over the past year d/t other medical issues       Pt is working on eating three meals per day - some of her medications make it so she's not hungry -- pt states she is eating maybe 2 meals per day (1 meal is a boiled egg) eats lunch and dinner - making sure to have a protein source at each meal.  Pt is making sure she is choosing healthier snack options -- SF popsicles and greek yogurt     Vitamins:       Hydration:  Calories:  Protein:  Carbohydrates:   Exercise:  Do you currently have an exercise routine? yes  What kind of exercise do you do? PT . For how long? 3-4 days per week For how often? 30-60 minutes    Goals:   1. Work on eating three meals per day - even if they're small meals - no skipping  2. Find a protein shake (premier) that you can use as a meal replacement (breakfast) or snack - can drink cup of coffee at 6 am and then premier shake at 9 am -- or can mix the two together --> coffee does not count as a meal   3.  Can have 10-13 grapes a day is okay - continue using the sugar free popsicles and the greek yogurt      F/u: PRN

## 2017-11-15 ENCOUNTER — OFFICE VISIT (OUTPATIENT)
Dept: SURGERY | Age: 57
End: 2017-11-15

## 2017-11-15 ENCOUNTER — CLINICAL SUPPORT (OUTPATIENT)
Dept: SURGERY | Age: 57
End: 2017-11-15

## 2017-11-15 VITALS
SYSTOLIC BLOOD PRESSURE: 114 MMHG | HEART RATE: 76 BPM | RESPIRATION RATE: 16 BRPM | OXYGEN SATURATION: 100 % | WEIGHT: 197 LBS | BODY MASS INDEX: 33.63 KG/M2 | DIASTOLIC BLOOD PRESSURE: 77 MMHG | HEIGHT: 64 IN

## 2017-11-15 VITALS — WEIGHT: 197 LBS | HEIGHT: 64 IN | BODY MASS INDEX: 33.63 KG/M2

## 2017-11-15 DIAGNOSIS — K90.9 INTESTINAL MALABSORPTION, UNSPECIFIED TYPE: Primary | ICD-10-CM

## 2017-11-15 DIAGNOSIS — Z98.84 S/P BARIATRIC SURGERY: Primary | ICD-10-CM

## 2017-11-15 DIAGNOSIS — Z98.84 S/P BARIATRIC SURGERY: ICD-10-CM

## 2017-11-15 RX ORDER — ERGOCALCIFEROL 1.25 MG/1
50000 CAPSULE ORAL
COMMUNITY
End: 2019-10-08 | Stop reason: ALTCHOICE

## 2017-11-15 RX ORDER — SUMATRIPTAN 100 MG/1
100 TABLET, FILM COATED ORAL
COMMUNITY
End: 2019-10-08 | Stop reason: ALTCHOICE

## 2017-11-15 RX ORDER — BUPROPION HYDROCHLORIDE 150 MG/1
300 TABLET ORAL DAILY
COMMUNITY

## 2017-11-15 RX ORDER — NALTREXONE HYDROCHLORIDE 50 MG/1
50 TABLET, FILM COATED ORAL DAILY
COMMUNITY
End: 2019-10-08 | Stop reason: ALTCHOICE

## 2017-11-15 RX ORDER — NYSTATIN 100000 U/G
CREAM TOPICAL 2 TIMES DAILY
Qty: 15 G | Refills: 0 | Status: SHIPPED | OUTPATIENT
Start: 2017-11-15 | End: 2017-11-17 | Stop reason: SDUPTHER

## 2017-11-15 RX ORDER — ERGOCALCIFEROL 1.25 MG/1
50000 CAPSULE ORAL 2 TIMES WEEKLY
Qty: 52 CAP | Refills: 1 | Status: SHIPPED | OUTPATIENT
Start: 2017-11-17 | End: 2018-05-16

## 2017-11-15 RX ORDER — HYDROCODONE BITARTRATE AND ACETAMINOPHEN 5; 325 MG/1; MG/1
TABLET ORAL
COMMUNITY
End: 2019-10-08 | Stop reason: ALTCHOICE

## 2017-11-15 RX ORDER — TOPIRAMATE 100 MG/1
TABLET, FILM COATED ORAL 2 TIMES DAILY
COMMUNITY
End: 2021-11-10

## 2017-11-15 NOTE — PROGRESS NOTES
Leila Lebron is a 62 y.o. female who is 4.2 years s/p sleeve gastrectomy. The patient has lost 53 lbs since surgery. Body mass index is 33.81 kg/(m^2). The patient has lost 45% EBW. Patient's ultimate weight loss goal is 185# - so about 12 more pounds to go until goal weight       Diet History:  Breakfast  What are you eating and how much? Premier protein shake (half in the morning)    When? ii   Where? iii   Snacks  What are you eating and how much? Other half of premier protein shake as a morning snack    When? ii   Where? iii   Hydration  What are you eating and how much? water   When? ii   Where? ii   Lunch  What are you eating and how much? 1-2 ounces of a garden salad with crab salad (0.5 ounces)    When? ii   Where? iii   Snacks  What are you eating and how much? None    When? ii   Where? iii   Hydration  What are you eating and how much? water   When? ii   Where? iii   Dinner  What are you eating and how much? Rib tips (5) made at home and 4 pieces of broccoli    When? ii   Where? iii   Snacks  What are you eating and how much? None    When? ii   Where? iii   Hydration  What are you eating and how much? water   When? ii   Where? iii   Hydration: 48-64 ounces   Calories:  Protein: 70-90 grams   Carbohydrates:   Exercise:  Do you currently have an exercise routine? yes  What kind of exercise do you do? walking and PT . For how long? daily For how often? 30+ minutes depending on her daily activities     Goals:   1. Continue drinking a protein shake as a meal replacement and snack (can break it up into 2 1/2 servings) - can also drink the premier protein clear drink (20 grams of protein per bottle)   2. Continue to work on eating three meals per day - making sure to have a source of protein at all meals  3.  Continue to pick healthier higher protein low carb snack options       F/u: PRN

## 2017-11-15 NOTE — MR AVS SNAPSHOT
Visit Information Date & Time Provider Department Dept. Phone Encounter #  
 11/15/2017  8:45 AM MD [facility] [place] [phone] [id number] Your Appointments 12/11/2017 10:30 AM  
NUTRITION COUNSELING with TSS NUTR VISIT2 [facility] [place] ([facility]) Appt Note: 5:6  
 [facility] [address] [place] [address]  
  
   
 [address] [place] [address] Upcoming Health Maintenance Date Due Hepatitis C Screening 1960 DTaP/Tdap/Td series (1 - Tdap) 1/26/1981 PAP AKA CERVICAL CYTOLOGY 1/26/1981 FOBT Q 1 YEAR AGE 50-75 1/26/2010 BREAST CANCER SCRN MAMMOGRAM 9/3/2016 Influenza Age 5 to Adult 8/1/2017 Allergies as of 11/15/2017  Review Complete On: 11/15/2017 By: Yvette Samano MD  
  
 Severity Noted Reaction Type Reactions Percocet [Oxycodone-acetaminophen]  06/10/2013    Itching Current Immunizations  Never Reviewed No immunizations on file. Not reviewed this visit Vitals BP Pulse Resp Height(growth percentile) Weight(growth percentile) SpO2  
 114/77 (BP 1 Location: Left arm, BP Patient Position: Sitting) 76 16 5' 4\" (1.626 m) 197 lb (89.4 kg) 100% BMI OB Status Smoking Status 33.81 kg/m2 Menopause Former Smoker Vitals History BMI and BSA Data Body Mass Index Body Surface Area  
 33.81 kg/m 2 2.01 m 2 Preferred Pharmacy Pharmacy Name Phone [id number] [phone] Your Updated Medication List  
  
   
This list is accurate as of: 11/15/17  9:20 AM.  Always use your most recent med list.  
  
  
  
  
 buPROPion  mg tablet Commonly known as:  Juan Francisco Garzon Take 150 mg by mouth every morning. COREG CR 10 mg CR capsule Generic drug:  carvedilol Take  by mouth daily (with breakfast). gabapentin 300 mg capsule Commonly known as:  NEURONTIN Take 1 Cap by mouth three (3) times daily. HYDROcodone-acetaminophen 5-325 mg per tablet Commonly known as:  Radha President Take  by mouth. hydrOXYzine HCl 25 mg tablet Commonly known as:  ATARAX Take  by mouth three (3) times daily as needed for Itching. IMITREX 100 mg tablet Generic drug:  SUMAtriptan Take 100 mg by mouth once as needed for Migraine. methocarbamol 500 mg tablet Commonly known as:  ROBAXIN Take 1 Tab by mouth four (4) times daily. naltrexone 50 mg tablet Commonly known as:  DEPADE Take 50 mg by mouth daily. naproxen 500 mg tablet Commonly known as:  NAPROSYN Take 500 mg by mouth two (2) times daily (with meals). nystatin topical cream  
Commonly known as:  MYCOSTATIN Apply  to affected area two (2) times a day. topiramate 100 mg tablet Commonly known as:  TOPAMAX Take  by mouth two (2) times a day. vilazodone 40 mg Tab tablet Commonly known as:  VIIBRYD Take  by mouth daily. * VITAMIN D2 50,000 unit capsule Generic drug:  ergocalciferol Take 50,000 Units by mouth. * ergocalciferol 50,000 unit capsule Commonly known as:  ERGOCALCIFEROL Take 1 Cap by mouth two (2) times a week for 180 days. Start taking on:  11/17/2017 * Notice: This list has 2 medication(s) that are the same as other medications prescribed for you. Read the directions carefully, and ask your doctor or other care provider to review them with you. Prescriptions Sent to Pharmacy Refills  
 ergocalciferol (ERGOCALCIFEROL) 50,000 unit capsule 1 Starting on: 11/17/2017 Sig: Take 1 Cap by mouth two (2) times a week for 180 days. Class: Normal  
 Pharmacy: [facility] Ph #: [phone]  Route: Oral  
 nystatin (MYCOSTATIN) topical cream 0  
 Sig: Apply  to affected area two (2) times a day. Class: Normal  
 [facility]  #: [phone] Route: Topical  
  
Introducing [address]! Bushra Villalta introduces [employer] patient portal. Now you can access parts of your medical record, email your doctor's office, and request medication refills online. 1. In your internet browser, go to [email or web address] 2. Click on the First Time User? Click Here link in the Sign In box. You will see the New Member Sign Up page. 3. Enter your [employer] Access Code exactly as it appears below. You will not need to use this code after youve completed the sign-up process. If you do not sign up before the expiration date, you must request a new code. · [employer] Access Code: [email or web address] Expires: 2/13/2018  9:19 AM 
 
4. Enter the last four digits of your Social Security Number (xxxx) and Date of Birth (mm/dd/yyyy) as indicated and click Submit. You will be taken to the next sign-up page. 5. Create a [employer]im ID. This will be your [employer] login ID and cannot be changed, so think of one that is secure and easy to remember. 6. Create a [employer]im password. You can change your password at any time. 7. Enter your Password Reset Question and Answer. This can be used at a later time if you forget your password. 8. Enter your e-mail address. You will receive e-mail notification when new information is available in [address] 9. Click Sign Up. You can now view and download portions of your medical record. 10. Click the Download Summary menu link to download a portable copy of your medical information. If you have questions, please visit the Frequently Asked Questions section of the [employer] website. Remember, [employer] is NOT to be used for urgent needs. For medical emergencies, dial 911. Now available from your iPhone and Android! Please provide this summary of care documentation to your next provider. Your primary care clinician is listed as Phys Other. If you have any questions after today's visit, please call [phone].

## 2017-11-15 NOTE — PROGRESS NOTES
Subjective:     Silvina Narayanan  is a 62 y.o. female who presents for follow-up about 4 years following laparoscopic sleeve gastrectomy. She has lost a total of 54 pounds since surgery. Body mass index is 33.81 kg/(m^2). . EBWL is (46%). The patient presents today to assess their progress toward their goal of weight loss and to address any issues that may be present. Today the patient and I have reviewed their diet and how appropriate their food choices are. The following issues have been identified : Has lost 14 pounds since last visit on her own. .  Surgery related complication: none       She reports rash under pannus and denies vomiting and abdominal pain. The patients diet choices have been reviewed today and are very good  Patients pain score:2    The patient's exercise level: moderately active. Changes in her medical history and medications have been reviewed. Patient Active Problem List   Diagnosis Code    Abnormal EKG R94.31    Hypertension I10    Hypercholesteremia E78.00    Hx of smoking Z87.891    Migraine G43.909    Depression F32.9    Arthritic-like pain M25.50    CAD (coronary artery disease) I25.10    Intestinal malabsorption K90.9    H. pylori infection A04.8    Chronic gastritis K29.50    Fracture of distal femur ([facility] [address] S72.409A    Osteoarthritis M19.90    Hypokalemia E87.6    Urinary retention R33.9    UTI (lower urinary tract infection) N39.0     Past Medical History:   Diagnosis Date    Anemia     Arthritic-like pain     Arthritis     Binge-purge behavior June, 2013    Pt admits to binge eating and purging 3 times in the last four months.     Bipolar disorder ([address]     CAD (coronary artery disease) 2004    questionable hx of MI    Depression     Hx of smoking     Hypercholesteremia     Hypertension     Menopause     Migraine     Morbid obesity (HCC)     Postmenopausal     Sickle cell trait ([address]      Past Surgical History:   Procedure Laterality Date  DELIVERY   18    HX CHOLECYSTECTOMY      laparoscopic    HX CYST INCISION AND DRAINAGE      HX FEMUR FRACTURE TX      HX GI  2013    sleeve resection    HX KNEE ARTHROSCOPY  2010    right    HX KNEE REPLACEMENT  2011    bilateral    HX ORTHOPAEDIC  -    left knee revision     Current Outpatient Prescriptions   Medication Sig Dispense Refill    topiramate (TOPAMAX) 100 mg tablet Take  by mouth two (2) times a day.  buPROPion XL (WELLBUTRIN XL) 150 mg tablet Take 150 mg by mouth every morning.  naltrexone (DEPADE) 50 mg tablet Take 50 mg by mouth daily.  SUMAtriptan (IMITREX) 100 mg tablet Take 100 mg by mouth once as needed for Migraine.  HYDROcodone-acetaminophen (NORCO) 5-325 mg per tablet Take  by mouth.  carvedilol (COREG CR) 10 mg CR capsule Take  by mouth daily (with breakfast).  hydrOXYzine HCl (ATARAX) 25 mg tablet Take  by mouth three (3) times daily as needed for Itching.  naproxen (NAPROSYN) 500 mg tablet Take 500 mg by mouth two (2) times daily (with meals).  vilazodone (VIIBRYD) 40 mg tab tablet Take  by mouth daily.  gabapentin (NEURONTIN) 300 mg capsule Take 1 Cap by mouth three (3) times daily. 90 Cap 0    methocarbamol (ROBAXIN) 500 mg tablet Take 1 Tab by mouth four (4) times daily.  20 Tab 0        Review of Symptoms:       General - No history or complaints of unexpected fever or chills  Head/Neck - No history or complaints of headache or dizziness  Cardiac - No history or complaints of chest pain, palpitations, or shortness of breath  Pulmonary - No history or complaints of shortness of breath or productive cough  Gastrointestinal - as noted above  Genitourinary - No history or complaints of hematuria/dysuria or renal lithiasis  Musculoskeletal - No history or complaints of joint  muscular weakness  Hematologic - No history of any bleeding episodes  Neurologic - No history or complaints of  migraine headaches or neurologic symptoms                     Objective:     Visit Vitals    /77 (BP 1 Location: Left arm, BP Patient Position: Sitting)    Pulse 76    Resp 16    Ht 5' 4\" (1.626 m)    Wt 89.4 kg (197 lb)    SpO2 100%    BMI 33.81 kg/m2        Physical Exam:    General:  alert, cooperative, no distress, appears stated age   Lungs:   clear to auscultation bilaterally   Heart:  Regular rate and rhythm   Abdomen:   abdomen is soft without significant tenderness, masses, organomegaly or guarding; rash under pannus   Incisions: healing well, no hernias       Lab Results   Component Value Date/Time    WBC 4.6 08/14/2017 12:00 AM    HGB 11.4 08/14/2017 12:00 AM    HCT 37.4 08/14/2017 12:00 AM    PLATELET 448 62/38/1940 12:00 AM    MCV 82 08/14/2017 12:00 AM     Lab Results   Component Value Date/Time    Sodium 138 08/14/2017 12:00 AM    Potassium 4.3 08/14/2017 12:00 AM    Chloride 102 08/14/2017 12:00 AM    CO2 21 08/14/2017 12:00 AM    Anion gap 5 08/31/2016 10:16 PM    Glucose 70 08/14/2017 12:00 AM    BUN 13 08/14/2017 12:00 AM    Creatinine 0.42 08/14/2017 12:00 AM    BUN/Creatinine ratio 31 08/14/2017 12:00 AM    GFR est  08/14/2017 12:00 AM    GFR est non- 08/14/2017 12:00 AM    Calcium 8.9 08/14/2017 12:00 AM    Bilirubin, total <0.2 08/14/2017 12:00 AM    AST (SGOT) 9 08/14/2017 12:00 AM    Alk. phosphatase 73 08/14/2017 12:00 AM    Protein, total 7.3 08/14/2017 12:00 AM    Albumin 3.9 08/14/2017 12:00 AM    Globulin 3.9 08/31/2016 10:16 PM    A-G Ratio 1.1 08/14/2017 12:00 AM    ALT (SGPT) 6 08/14/2017 12:00 AM     Lab Results   Component Value Date/Time    Iron 36 08/14/2017 12:00 AM    Ferritin 12 08/14/2017 12:00 AM     Lab Results   Component Value Date/Time    Folate 5.4 08/14/2017 12:00 AM     Lab Results   Component Value Date/Time    Vitamin D 25-Hydroxy 16.3 05/27/2016 12:09 PM    VITAMIN D, 25-HYDROXY 16.2 08/14/2017 12:00 AM           Assessment:     1.  History of Morbid obesity, status post  laparoscopic sleeve gastrectomy. Doing great with good weight loss. Plan:     1. Remember to measure portions, continue low carbohydrate diet  2. Continue to concentrate on protein intake meeting daily requirements  3. Remember vitamin supplements. The importance of such was discussed regarding the malabsorptive issues that the surgery creates. 4. Exercise regimen appears to be: fair  5. Try and attend support group if feasible. 6. Follow-up in 6 month(s). 7. Lab reviewed   8. Total time spent with the patient 30 minutes.   9. Nystatin for rash, would benefit from abdominoplasty

## 2017-11-17 RX ORDER — NYSTATIN 100000 U/G
CREAM TOPICAL 2 TIMES DAILY
Qty: 15 G | Refills: 0 | Status: SHIPPED | OUTPATIENT
Start: 2017-11-17 | End: 2018-04-24 | Stop reason: SDUPTHER

## 2017-11-17 RX ORDER — NYSTATIN 100000 U/G
CREAM TOPICAL 2 TIMES DAILY
Qty: 15 G | Refills: 0 | Status: SHIPPED | OUTPATIENT
Start: 2017-11-17 | End: 2017-11-17 | Stop reason: CLARIF

## 2017-12-11 ENCOUNTER — CLINICAL SUPPORT (OUTPATIENT)
Dept: SURGERY | Age: 57
End: 2017-12-11

## 2017-12-11 VITALS — HEIGHT: 64 IN | WEIGHT: 193 LBS | BODY MASS INDEX: 32.95 KG/M2

## 2017-12-11 DIAGNOSIS — Z98.84 S/P BARIATRIC SURGERY: Primary | ICD-10-CM

## 2017-12-11 NOTE — PATIENT INSTRUCTIONS
Goals: 1. Start taking a multivitamin 2 times a day, plus a B12 supplement once a day   2.  Continue using your protein shake as a meal replacement or snack

## 2017-12-11 NOTE — PROGRESS NOTES
Ame Abbott is a 62 y.o. female who is 4 years s/p sleeve gastrectomy. The patient has lost 57 lbs since surgery. Body mass index is 33.13 kg/(m^2). The patient has lost 48% EBW. Vitamins: Vitamin D (prescription), is going to start taking a MVI     Patient is using a protein shake as a meal replacement or snack (her ultimate goal weight it 190#), patient has stopped eating yogurt, and using a 90 calorie cake - as a snack option. Patient is also working on eating three meals per day and using appropriate snacks. Hydration: 48-64 ounces a day   Calories:  Protein:  Carbohydrates:   Exercise:  Do you currently have an exercise routine? yes  What kind of exercise do you do? walking  . For how long? 20-30 minutes For how often? 5 days a week    Goals:   1. Start taking a multivitamin 2 times a day, plus a B12 supplement once a day   2.  Continue using your protein shake as a meal replacement or snack      F/u:

## 2018-04-24 RX ORDER — NYSTATIN 100000 U/G
CREAM TOPICAL 2 TIMES DAILY
Qty: 15 G | Refills: 2 | Status: SHIPPED | OUTPATIENT
Start: 2018-04-24 | End: 2019-10-08 | Stop reason: ALTCHOICE

## 2018-05-02 ENCOUNTER — CLINICAL SUPPORT (OUTPATIENT)
Dept: SURGERY | Age: 58
End: 2018-05-02

## 2018-05-02 VITALS — WEIGHT: 179 LBS | BODY MASS INDEX: 30.73 KG/M2

## 2018-05-02 DIAGNOSIS — Z98.84 S/P BARIATRIC SURGERY: Primary | ICD-10-CM

## 2018-05-02 NOTE — PROGRESS NOTES
Alysia Rascon is a 62 y.o. female who is 4.4 years  s/p sleeve gastrectomy. The patient has lost 71 lbs since surgery. Body mass index is 30.73 kg/(m^2) (pended). The patient has lost 60% EBW. Patient's overall goal is 175-185#     Vitamins: same as last visit       Diet History:  Breakfast  What are you eating and how much? i   When? ii   Where? iii   Snacks  What are you eating and how much? i   When? ii   Where? iii   Hydration  What are you eating and how much? i   When? ii   Where? ii   Lunch  What are you eating and how much? i   When? ii   Where? iii   Snacks  What are you eating and how much? i   When? ii   Where? iii   Hydration  What are you eating and how much? i   When? ii   Where? iii   Dinner  What are you eating and how much? i   When? ii   Where? iii   Snacks  What are you eating and how much? i   When? ii   Where? iii   Hydration  What are you eating and how much? i   When? ii   Where? iii   Hydration:  Calories:  Protein:  Carbohydrates:   Exercise:  Do you currently have an exercise routine? yes  What kind of exercise do you do? patient has not been able to walk lately (the past 3 weeks), but when she in [place] the past 4 months she was walking daily . For how long? 30+ minutes For how often? 7 days a week    Goals:   1. Continue current vitamins, making sure you're taking your MVI twice a day  2. Continue using protein shake once a day as a meal replacement  3.  Continue current diet changes of a high protein low carbohydrate diet     F/u: prn

## 2019-08-14 ENCOUNTER — DOCUMENTATION ONLY (OUTPATIENT)
Dept: SURGERY | Age: 59
End: 2019-08-14

## 2019-08-14 NOTE — LETTER
[facility] 
 
 
Dear Patient, Your health is our main concern. It is important for your health to have follow-up lab work and to see you surgeon at 2 months, 4 months, 6 months, 9 months and annually after your weight loss surgery. Additionally, the Department of Bariatric Surgery at our hospital is a member of the [employer] [address] Surgical Quality Improvement Program ([facility] NSQIP). As a participant in this program, we gather information on the outcomes of our patients after surgery. Please call the office for a follow up appointment at [phone]. If you have moved out of the area or have changed surgeons please call us and let us know the name of your doctor. Your health and feedback are important to us. We greatly appreciate your response. Thank you, [facility]

## 2019-08-14 NOTE — PROGRESS NOTES
Per [facility] requirements;  E-mail and letter sent for follow up appointment. [facility] [place]  [facility]  [facility]      Dear Patient,    Your health is our main concern. It is important for your health to have follow-up lab work and to see you surgeon at 2 months, 4 months, 6 months, 9 months and annually after your weight loss surgery. Additionally, the Department of Bariatric Surgery at our hospital is a member of the [employer] [address] Surgical Quality Improvement Program ([facility] NSQIP). As a participant in this program, we gather information on the outcomes of our patients after surgery. Please call the office for a follow up appointment at [phone]. If you have moved out of the area or have changed surgeons please call us and let us know the name of your doctor. Your health and feedback are important to us. We greatly appreciate your response.        Thank you,  [facility] [address] [facility]

## 2019-10-08 ENCOUNTER — OFFICE VISIT (OUTPATIENT)
Dept: SURGERY | Age: 59
End: 2019-10-08

## 2019-10-08 ENCOUNTER — HOSPITAL ENCOUNTER (EMERGENCY)
Age: 59
Discharge: HOME | End: 2019-10-08
Attending: EMERGENCY MEDICINE
Payer: MEDICARE

## 2019-10-08 VITALS
BODY MASS INDEX: 32.44 KG/M2 | HEIGHT: 64 IN | TEMPERATURE: 98.1 F | RESPIRATION RATE: 16 BRPM | OXYGEN SATURATION: 100 % | WEIGHT: 190 LBS | HEART RATE: 56 BPM | SYSTOLIC BLOOD PRESSURE: 124 MMHG | DIASTOLIC BLOOD PRESSURE: 68 MMHG

## 2019-10-08 VITALS
SYSTOLIC BLOOD PRESSURE: 140 MMHG | HEART RATE: 89 BPM | DIASTOLIC BLOOD PRESSURE: 89 MMHG | HEIGHT: 64 IN | WEIGHT: 190 LBS | BODY MASS INDEX: 32.44 KG/M2 | OXYGEN SATURATION: 100 % | TEMPERATURE: 98.4 F

## 2019-10-08 DIAGNOSIS — E78.00 HYPERCHOLESTEREMIA: ICD-10-CM

## 2019-10-08 DIAGNOSIS — E55.9 HYPOVITAMINOSIS D: ICD-10-CM

## 2019-10-08 DIAGNOSIS — Z98.84 S/P LAPAROSCOPIC SLEEVE GASTRECTOMY: ICD-10-CM

## 2019-10-08 DIAGNOSIS — M19.90 OSTEOARTHRITIS, UNSPECIFIED OSTEOARTHRITIS TYPE, UNSPECIFIED SITE: ICD-10-CM

## 2019-10-08 DIAGNOSIS — K90.9 INTESTINAL MALABSORPTION, UNSPECIFIED TYPE: Primary | ICD-10-CM

## 2019-10-08 DIAGNOSIS — I10 ESSENTIAL HYPERTENSION: ICD-10-CM

## 2019-10-08 DIAGNOSIS — G43.009 MIGRAINE WITHOUT AURA AND RESPONSIVE TO TREATMENT: Primary | ICD-10-CM

## 2019-10-08 LAB
ALBUMIN SERPL-MCNC: 3.6 G/DL (ref 3.4–5)
ALBUMIN/GLOB SERPL: 1 {RATIO} (ref 0.8–1.7)
ALP SERPL-CCNC: 50 U/L (ref 45–117)
ALT SERPL-CCNC: 17 U/L (ref 13–56)
ANION GAP SERPL CALC-SCNC: 6 MMOL/L (ref 3–18)
AST SERPL-CCNC: 9 U/L (ref 10–38)
BASOPHILS # BLD: 0 K/UL (ref 0–0.1)
BASOPHILS NFR BLD: 1 % (ref 0–2)
BILIRUB SERPL-MCNC: 0.3 MG/DL (ref 0.2–1)
BUN SERPL-MCNC: 8 MG/DL (ref 7–18)
BUN/CREAT SERPL: 12 (ref 12–20)
CALCIUM SERPL-MCNC: 8.4 MG/DL (ref 8.5–10.1)
CHLORIDE SERPL-SCNC: 109 MMOL/L (ref 100–111)
CO2 SERPL-SCNC: 25 MMOL/L (ref 21–32)
CREAT SERPL-MCNC: 0.66 MG/DL (ref 0.6–1.3)
DIFFERENTIAL METHOD BLD: ABNORMAL
EOSINOPHIL # BLD: 0.1 K/UL (ref 0–0.4)
EOSINOPHIL NFR BLD: 2 % (ref 0–5)
ERYTHROCYTE [DISTWIDTH] IN BLOOD BY AUTOMATED COUNT: 14.1 % (ref 11.6–14.5)
GLOBULIN SER CALC-MCNC: 3.6 G/DL (ref 2–4)
GLUCOSE SERPL-MCNC: 94 MG/DL (ref 74–99)
HCT VFR BLD AUTO: 36.2 % (ref 35–45)
HGB BLD-MCNC: 11.7 G/DL (ref 12–16)
LYMPHOCYTES # BLD: 1.8 K/UL (ref 0.9–3.6)
LYMPHOCYTES NFR BLD: 43 % (ref 21–52)
MCH RBC QN AUTO: 26.6 PG (ref 24–34)
MCHC RBC AUTO-ENTMCNC: 32.3 G/DL (ref 31–37)
MCV RBC AUTO: 82.3 FL (ref 74–97)
MONOCYTES # BLD: 0.4 K/UL (ref 0.05–1.2)
MONOCYTES NFR BLD: 10 % (ref 3–10)
NEUTS SEG # BLD: 1.8 K/UL (ref 1.8–8)
NEUTS SEG NFR BLD: 44 % (ref 40–73)
PLATELET # BLD AUTO: 254 K/UL (ref 135–420)
PMV BLD AUTO: 10 FL (ref 9.2–11.8)
POTASSIUM SERPL-SCNC: 3.8 MMOL/L (ref 3.5–5.5)
PROT SERPL-MCNC: 7.2 G/DL (ref 6.4–8.2)
RBC # BLD AUTO: 4.4 M/UL (ref 4.2–5.3)
SODIUM SERPL-SCNC: 140 MMOL/L (ref 136–145)
WBC # BLD AUTO: 4.2 K/UL (ref 4.6–13.2)

## 2019-10-08 PROCEDURE — 99284 EMERGENCY DEPT VISIT MOD MDM: CPT

## 2019-10-08 PROCEDURE — 85025 COMPLETE CBC W/AUTO DIFF WBC: CPT

## 2019-10-08 PROCEDURE — 74011250636 HC RX REV CODE- 250/636: Performed by: PHYSICIAN ASSISTANT

## 2019-10-08 PROCEDURE — 80053 COMPREHEN METABOLIC PANEL: CPT

## 2019-10-08 PROCEDURE — 96374 THER/PROPH/DIAG INJ IV PUSH: CPT

## 2019-10-08 PROCEDURE — 96375 TX/PRO/DX INJ NEW DRUG ADDON: CPT

## 2019-10-08 RX ORDER — TRAZODONE HYDROCHLORIDE 100 MG/1
300 TABLET ORAL
COMMUNITY
Start: 2015-12-09

## 2019-10-08 RX ORDER — DIPHENHYDRAMINE HYDROCHLORIDE 50 MG/ML
25 INJECTION, SOLUTION INTRAMUSCULAR; INTRAVENOUS
Status: COMPLETED | OUTPATIENT
Start: 2019-10-08 | End: 2019-10-08

## 2019-10-08 RX ORDER — ACETAMINOPHEN 10 MG/ML
1000 INJECTION, SOLUTION INTRAVENOUS ONCE
Status: COMPLETED | OUTPATIENT
Start: 2019-10-08 | End: 2019-10-08

## 2019-10-08 RX ORDER — CETIRIZINE HCL 10 MG
10 TABLET ORAL
COMMUNITY
Start: 2019-08-19 | End: 2020-08-18

## 2019-10-08 RX ORDER — METOCLOPRAMIDE HYDROCHLORIDE 5 MG/ML
10 INJECTION INTRAMUSCULAR; INTRAVENOUS
Status: COMPLETED | OUTPATIENT
Start: 2019-10-08 | End: 2019-10-08

## 2019-10-08 RX ADMIN — ACETAMINOPHEN 1000 MG: 10 INJECTION, SOLUTION INTRAVENOUS at 11:58

## 2019-10-08 RX ADMIN — SODIUM CHLORIDE 1000 ML: 900 INJECTION, SOLUTION INTRAVENOUS at 11:58

## 2019-10-08 RX ADMIN — METOCLOPRAMIDE 10 MG: 5 INJECTION, SOLUTION INTRAMUSCULAR; INTRAVENOUS at 11:58

## 2019-10-08 RX ADMIN — DIPHENHYDRAMINE HYDROCHLORIDE 25 MG: 50 INJECTION, SOLUTION INTRAMUSCULAR; INTRAVENOUS at 11:58

## 2019-10-08 NOTE — ED NOTES
Upon nurse entering room for pt assessment, pt states, \"Just one minute\" and makes a phone call.  Nurse will return later for assessment

## 2019-10-08 NOTE — PATIENT INSTRUCTIONS
Patient Instructions      1. Remember hydration goals - minimum of 64 ounces of liquids per day (dehydration is the number one reason for hospital readmission). 2. Continue to monitor carbohydrate and protein intake you need a minimum of  Grams of protein daily- remember to keep your total carbohydrates to 50 grams or less per day for best results. 3. Continue to work towards exercise goals - 60-90 minutes, 5 times a week minimum of deliberate, aerobic exercise is the ultimate goal with strength training 2 times each week. Refer to [employer] for  information. 4. Remember to take vitamins as directed. 5. Attend support group the 2nd Thursday of each month. 6. Use Miralax if you become constipated. 7. Call us at [phone] or email us through [email or web address]" with questions,     concerns or worsening of condition, we have someone on call 24 hours a day. If you are unable to reach our office, you are to go to your Primary Care Physician or the Emergency Department. 8. If any labs have been ordered as a part of this visit, you will only be contacted if found to be abnormal. If you would like to look at the results for yourself, you can sign up for 'My Chart\". 2017 [facility] office staff can offer you assistance with setting that up. Supplement Resource Guide    Importance of Protein:   Maintains lean body mass, produces antibodies to fight off infections, heals wounds, minimizes hair loss, helps to give you energy, helps with satiety, and keeping you full between meals. Importance of Calcium:  Needed for healthy bones and teeth, normal blood clotting, and nervous system functioning, higher risk of osteoporosis and bone disease with non-compliance. Importance of Multivitamins: Many functions. Supply you with extra nutrients that you may be missing from food.   May lead to iron deficiency anemia, weakness, fatigue, and many other symptoms with non-compliance. Importance of B Vitamins:  Important for red blood cell formation, metabolism, energy, and helps to maintain a healthy nervous system. Protein Supplement  Find one you like now. Use immediately after surgery. Look for:  35-50g protein each day from your protein supplement once you reach the progression diet. 0-3 g fat per serving  0-3 g sugar per serving    Protein drinks should be split in separate dosages. Recommend: Lifelong  1 year + Calcium Supplement:     Start taking within a month after surgery. Look for: Calcium Citrate Plus D (1500 mg per day)  Recommend: Citracal     .            Avoid chocolate chewable calcium. Can use chewable bariatric or [employer] brand or similar chewable. The body cannot absorb more than 500-600 mg of calcium at a time. Take for Life Multi-vitamin Supplement:      Start immediately after surgery: any complete chewable, such as: [place]s Complete chewables. Avoid [place] sours or gummies. They lack iron and other important nutrients and also have added sugar. Continue with chewable vitamin or change to adult complete multivitamin one month after surgery. Menstruating women can take a prenatal vitamin. Make sure has at least 18 mg iron and 894-275 mcg folic acid   Vitamin N57, B Complex Vitamin, and Biotin  Start taking within a month after surgery. Vitamin B12:  1000 mcg of Vitamin B12 three times weekly    Must take sublingually (meaning you take it under your tongue) or in a liquid drop form for easy absorption. B Complex Vitamin: Take a pill or liquid drop form once daily. Biotin: This vitamin can help prevent hair loss. Recommend 5mg   (5000 mcg) a day  Biotin is Optional              Learning About Physical Activity  What is physical activity? Physical activity is any kind of activity that gets your body moving.   The types of physical activity that can help you get fit and stay healthy include:  · Aerobic or \"cardio\" activities that make your heart beat faster and make you breathe harder, such as brisk walking, riding a bike, or running. Aerobic activities strengthen your heart and lungs and build up your endurance. · Strength training activities that make your muscles work against, or \"resist,\" something, such as lifting weights or doing push-ups. These activities help tone and strengthen your muscles. · Stretches that allow you to move your joints and muscles through their full range of motion. Stretching helps you be more flexible and avoid injury. What are the benefits of physical activity? Being active is one of the best things you can do to get fit and stay healthy. It helps you to:  · Feel stronger and have more energy to do all the things you like to do. · Focus better at school or work and perform better in sports. · Feel, think, and sleep better. · Reach and stay at a healthy weight. · Lose fat and build lean muscle. · Lower your risk for serious health problems. · Keep your bones, muscles, and joints strong. Being fit lets you do more physical activity. And it lets you work out harder without as much effort. How can you make physical activity part of your life? Get at least 30 minutes of exercise on most days of the week. Walking is a good choice. You also may want to do other activities, such as running, swimming, cycling, or playing tennis or team sports. Pick activities that you like--ones that make your heart beat faster, your muscles stronger, and your muscles and joints more flexible. If you find more than one thing you like doing, do them all. You don't have to do the same thing every day. Get your heart pumping every day. Any activity that makes your heart beat faster and keeps it at that rate for a while counts. Here are some great ways to get your heart beating faster:  · Go for a brisk walk, run, or bike ride. · Go for a hike or swim.   · Go in-line skating. · Play a game of touch football, basketball, or soccer. · Ride a bike. · Play tennis or racquetball. · Climb stairs. Even some household chores can be aerobic--just do them at a faster pace. Vacuuming, raking or mowing the lawn, sweeping the garage, and washing and waxing the car all can help get your heart rate up. Strengthen your muscles during the week. You don't have to lift heavy weights or grow big, bulky muscles to get stronger. Doing a few simple activities that make your muscles work against, or \"resist,\" something can help you get stronger. For example, you can:  · Do push-ups or sit-ups, which use your own body weight as resistance. · Lift weights or dumbbells or use stretch bands at home or in a gym or community center. Stretch your muscles often. Stretching will help you as you become more active. It can help you stay flexible, loosen tight muscles, and avoid injury. It can also help improve your balance and posture and can be a great way to relax. Be sure to stretch the muscles you'll be using when you work out. It's best to warm your muscles slightly before you stretch them. Walk or do some other light aerobic activity for a few minutes, and then start stretching. When you stretch your muscles:  · Do it slowly. Stretching is not about going fast or making sudden movements. · Don't push or bounce during a stretch. · Hold each stretch for at least 15 to 30 seconds, if you can. You should feel a stretch in the muscle, but not pain. · Breathe out as you do the stretch. Then breathe in as you hold the stretch. Don't hold your breath. If you're worried about how more activity might affect your health, have a checkup before you start. Follow any special advice your doctor gives you for getting a smart start. Where can you learn more? Go to http://alok-shashank.info/. Enter O344 in the search box to learn more about \"Learning About Physical Activity. \"  Current as of: May 5, 2019  Content Version: 12.2  © 9662-5771 [employer]. Care instructions adapted under license by [employer] (which disclaims liability or warranty for this information). If you have questions about a medical condition or this instruction, always ask your healthcare professional. [address] any warranty or liability for your use of this information.

## 2019-10-08 NOTE — ED PROVIDER NOTES
EMERGENCY DEPARTMENT HISTORY AND PHYSICAL EXAM    Date: 10/8/2019  Patient Name: Margarito Fountain    History of Presenting Illness     Chief Complaint   Patient presents with    Migraine         History Provided By: Patient    Chief Complaint: migraine    HPI(Context):   11:10 AM  Margarito Fountain is a 61 y.o. female with PMHX of migraine who presents to the emergency department C/O migraine. Associated sxs include nausea, vomiting, photophobia. Sxs started 2 says ago. Gradual onset. Located to right sided of head. No at home medications. Same as previous migraines with no atypical sxs. Pt denies fever, chills, neck pain, trauma, N/W/T, slurred speech, and any other sxs or complaints. PCP: Colleen Rodríguez, DO    Current Outpatient Medications   Medication Sig Dispense Refill    cetirizine (ZYRTEC) 10 mg tablet Take 10 mg by mouth.  traZODone (DESYREL) 100 mg tablet Take 200 mg by mouth.  topiramate (TOPAMAX) 100 mg tablet Take  by mouth two (2) times a day.  buPROPion XL (WELLBUTRIN XL) 150 mg tablet Take 150 mg by mouth every morning.  hydrOXYzine HCl (ATARAX) 25 mg tablet Take  by mouth three (3) times daily as needed for Itching.  naproxen (NAPROSYN) 500 mg tablet Take 500 mg by mouth two (2) times daily (with meals).  vilazodone (VIIBRYD) 40 mg tab tablet Take  by mouth daily. Past History     Past Medical History:  Past Medical History:   Diagnosis Date    Anemia     Arthritic-like pain     Arthritis     Binge-purge behavior June, 2013    Pt admits to binge eating and purging 3 times in the last four months.     Bipolar disorder ([address]     CAD (coronary artery disease) 2004    questionable hx of MI    Depression     Hx of smoking     Hypercholesteremia     Hypertension     Menopause     Migraine     Morbid obesity ([address]     Postmenopausal     Sickle cell trait ([address]        Past Surgical History:  Past Surgical History:   Procedure Laterality Date    DELIVERY       HX CHOLECYSTECTOMY      laparoscopic    HX CYST INCISION AND DRAINAGE      HX FEMUR FRACTURE TX      HX GI  2013    sleeve resection    HX KNEE ARTHROSCOPY  2010    right    HX KNEE REPLACEMENT  2011    bilateral    HX ORTHOPAEDIC  -    left knee revision       Family History:  Family History   Problem Relation Age of Onset    Heart Disease Mother         Mom was told she had irreg heart beat/hole in [place], [place] Breast Cancer Mother     Other Other         Non- contributory    Breast Cancer Maternal Grandmother     Malignant Hyperthermia Neg Hx     Pseudocholinesterase Deficiency Neg Hx     Delayed Awakening Neg Hx     Post-op Nausea/Vomiting Neg Hx     Emergence Delirium Neg Hx     Post-op Cognitive Dysfunction Neg Hx        Social History:  Social History     Tobacco Use    Smoking status: Former Smoker     Packs/day: 0.25     Years: 35.00     Pack years: 8.75     Types: Cigarettes     Last attempt to quit: 1/10/2017     Years since quittin.7    Smokeless tobacco: Never Used    Tobacco comment: states she smokes e-cigarettes   Substance Use Topics    Alcohol use: Yes     Alcohol/week: 0.8 standard drinks     Types: 1 Standard drinks or equivalent per week    Drug use: No       Allergies: Allergies   Allergen Reactions    Percocet [Oxycodone-Acetaminophen] Itching         Review of Systems   Review of Systems   Constitutional: Negative for chills and fever. Eyes: Positive for photophobia. Negative for visual disturbance. Gastrointestinal: Positive for nausea and vomiting. Musculoskeletal: Negative for neck pain. Neurological: Positive for headaches. Negative for dizziness, weakness and numbness. All other systems reviewed and are negative.       Physical Exam     Vitals:    10/08/19 1100 10/08/19 1207 10/08/19 1226 10/08/19 1300   BP: 137/81 129/78  124/68   Pulse: 67 65  (!) 56   Resp: 18 18  16   Temp: 98.1 °F (36.7 °C)      SpO2: 100% 99% 99% 100%   Weight: 86.2 kg (190 lb)      Height: 5' 4\" (1.626 m)        Physical Exam   Constitutional: She is oriented to person, place, and time. She appears well-developed and well-nourished. No distress. AA female in NAD. Alert. Sitting in dark room. Answering questions. Following directions   HENT:   Head: Normocephalic and atraumatic. Right Ear: External ear normal.   Left Ear: External ear normal.   Nose: Nose normal.   Mouth/Throat: Uvula is midline, oropharynx is clear and moist and mucous membranes are normal. No oropharyngeal exudate, posterior oropharyngeal edema, posterior oropharyngeal erythema or tonsillar abscesses. Eyes: Conjunctivae are normal.   Neck: Normal range of motion. Neck supple. Cardiovascular: Normal rate, regular rhythm, normal heart sounds and intact distal pulses. Exam reveals no gallop and no friction rub. No murmur heard. Pulmonary/Chest: Effort normal and breath sounds normal. No accessory muscle usage. No tachypnea. No respiratory distress. She has no decreased breath sounds. She has no wheezes. She has no rhonchi. She has no rales. Musculoskeletal: Normal range of motion. Lymphadenopathy:     She has no cervical adenopathy. Neurological: She is alert and oriented to person, place, and time. She has normal strength. No cranial nerve deficit or sensory deficit. Coordination normal. GCS eye subscore is 4. GCS verbal subscore is 5. GCS motor subscore is 6. Skin: Skin is warm and dry. She is not diaphoretic. Psychiatric: She has a normal mood and affect. Judgment normal.   Nursing note and vitals reviewed.           Diagnostic Study Results     Labs -     Recent Results (from the past 12 hour(s))   CBC WITH AUTOMATED DIFF    Collection Time: 10/08/19 11:50 AM   Result Value Ref Range    WBC 4.2 (L) 4.6 - 13.2 K/uL    RBC 4.40 4.20 - 5.30 M/uL    HGB 11.7 (L) 12.0 - 16.0 g/dL    HCT 36.2 35.0 - 45.0 %    MCV 82.3 74.0 - 97.0 FL    MCH 26.6 24.0 - 34.0 PG MCHC 32.3 31.0 - 37.0 g/dL    RDW 14.1 11.6 - 14.5 %    PLATELET 519 555 - 933 K/uL    MPV 10.0 9.2 - 11.8 FL    NEUTROPHILS 44 40 - 73 %    LYMPHOCYTES 43 21 - 52 %    MONOCYTES 10 3 - 10 %    EOSINOPHILS 2 0 - 5 %    BASOPHILS 1 0 - 2 %    ABS. NEUTROPHILS 1.8 1.8 - 8.0 K/UL    ABS. LYMPHOCYTES 1.8 0.9 - 3.6 K/UL    ABS. MONOCYTES 0.4 0.05 - 1.2 K/UL    ABS. EOSINOPHILS 0.1 0.0 - 0.4 K/UL    ABS. BASOPHILS 0.0 0.0 - 0.1 K/UL    DF AUTOMATED     METABOLIC PANEL, COMPREHENSIVE    Collection Time: 10/08/19 11:50 AM   Result Value Ref Range    Sodium 140 136 - 145 mmol/L    Potassium 3.8 3.5 - 5.5 mmol/L    Chloride 109 100 - 111 mmol/L    CO2 25 21 - 32 mmol/L    Anion gap 6 3.0 - 18 mmol/L    Glucose 94 74 - 99 mg/dL    BUN 8 7.0 - 18 MG/DL    Creatinine 0.66 0.6 - 1.3 MG/DL    BUN/Creatinine ratio 12 12 - 20      GFR est AA >60 >60 ml/min/1.73m2    GFR est non-AA >60 >60 ml/min/1.73m2    Calcium 8.4 (L) 8.5 - 10.1 MG/DL    Bilirubin, total 0.3 0.2 - 1.0 MG/DL    ALT (SGPT) 17 13 - 56 U/L    AST (SGOT) 9 (L) 10 - 38 U/L    Alk. phosphatase 50 45 - 117 U/L    Protein, total 7.2 6.4 - 8.2 g/dL    Albumin 3.6 3.4 - 5.0 g/dL    Globulin 3.6 2.0 - 4.0 g/dL    A-G Ratio 1.0 0.8 - 1.7           No orders to display     CT Results  (Last 48 hours)    None        CXR Results  (Last 48 hours)    None          Medications given in the ED-  Medications   sodium chloride 0.9 % bolus infusion 1,000 mL (0 mL IntraVENous IV Completed 10/8/19 1258)   acetaminophen (OFIRMEV) infusion 1,000 mg (0 mg IntraVENous IV Completed 10/8/19 1213)   metoclopramide HCl (REGLAN) injection 10 mg (10 mg IntraVENous Given 10/8/19 1158)   diphenhydrAMINE (BENADRYL) injection 25 mg (25 mg IntraVENous Given 10/8/19 1158)         Medical Decision Making   I am the first provider for this patient. I reviewed the vital signs, available nursing notes, past medical history, past surgical history, family history and social history.     Vital Signs-Reviewed the patient's vital signs. Pulse Oximetry Analysis - 100% on RA     Records Reviewed: Nursing Notes    Provider Notes (Medical Decision Making): migraine, tension, cluster, sinusitis, TA. Doubt CVA/TIA    Procedures:  Procedures    ED Course:   11:10 AM Initial assessment performed. The patients presenting problems have been discussed, and they are in agreement with the care plan formulated and outlined with them. I have encouraged them to ask questions as they arise throughout their visit. Diagnosis and Disposition       HA resolved. Feels better. No FND. Neck supple. Gradual onset. Not thunderclap. Not worst HA of life. C/W past migraines. Reasons to RTED discussed with pt. All questions answered. Pt feels comfortable going home at this time. Pt expressed understanding and she agrees with plan. 1. Migraine without aura and responsive to treatment        PLAN:  1. D/C Home  2. Current Discharge Medication List        3. Follow-up Information     Follow up With Specialties Details Why Contact Info    Delmy Jason DO Internal Medicine   [facility]      Edelmira Bhakta MD Neurology   [address] [place] [address]  [phone]      [facility] EMERGENCY DEPT Emergency Medicine  As needed, If symptoms worsen [address] [place] [id number] [phone]        _______________________________    Attestations: This note is prepared by Kori Jean PA-C.  _______________________________        Please note that this dictation was completed with [employer], the [place] DEPT. voice recognition software. Quite often unanticipated grammatical, syntax, homophones, and other interpretive errors are inadvertently transcribed by the computer software. Please disregard these errors. Please excuse any errors that have escaped final proofreading.

## 2019-10-08 NOTE — PROGRESS NOTES
Subjective:     Pink Nissen  is a 61 y.o. female who presents for follow-up about 6 years following laparoscopic sleeve gastrectomy. She has lost a total of 60 pounds since surgery. Body mass index is 32.61 kg/m². . EBWL is (50%). The patient presents today to assess their progress toward their goal of weight loss and to address any issues that may be present. Today the patient and I have reviewed their diet and how appropriate their food choices are. The following issues have been identified - none from a surgical standpoint, but is concerned about a 15 pound weight gain over past 6 months. Admits to vaping with nicotine and occ NSAID use. Surgery related complication: NA       She reports 48 hours migraine and going to ED after visit, has history of migraines since childhood. Denies weakness, change in speech, difficulty walking. She denies vomiting, abdominal pain, diarrhea and nausea. The patients diet choices have been reviewed today and are as follows:      Breakfast: V8 juice      Lunch: skips      Snack: special bar 100 calorie, pork rinds      Supper: meat or fish or shrimp and green veggie  Usually less 600 calories    Patients pain score:0/10 in abdominal, 8/10in head    The patient's exercise level: moderately active. Walks 1/2 mile every morning if leg allows her    Changes in her medical history and medications have been reviewed.     Comorbidities:    Hypertension: improved , off medications, elevated today because of pain  Diabetes: not applicable  Obstructive Sleep Apnea: not applicable  Hyperlipidemia: not applicable  Stress Urinary Incontinence: not applicable  Gastroesophageal Reflux: not applicable  Weight related arthropathy:unchanged    Patient Active Problem List   Diagnosis Code    Abnormal EKG R94.31    Hypertension I10    Hypercholesteremia E78.00    Hx of smoking Z87.891    Migraine G43.909    Depression F32.9    Arthritic-like pain M25.50    CAD (coronary artery disease) I25.10    Intestinal malabsorption K90.9    H. pylori infection A04.8    Chronic gastritis K29.50    Fracture of distal femur (HCC) S72.409A    Osteoarthritis M19.90    Hypokalemia E87.6    Urinary retention R33.9    UTI (lower urinary tract infection) N39.0    S/P laparoscopic sleeve gastrectomy Z98.84     Past Medical History:   Diagnosis Date    Anemia     Arthritic-like pain     Arthritis     Binge-purge behavior     Pt admits to binge eating and purging 3 times in the last four months.  Bipolar disorder ([address]     CAD (coronary artery disease) 2004    questionable hx of MI    Depression     Hx of smoking     Hypercholesteremia     Hypertension     Menopause     Migraine     Morbid obesity ([address]     Postmenopausal     Sickle cell trait ([facility] [address]      Past Surgical History:   Procedure Laterality Date    DELIVERY   18    HX CHOLECYSTECTOMY      laparoscopic    HX CYST INCISION AND DRAINAGE      HX FEMUR FRACTURE TX      HX GI  2013    sleeve resection    HX KNEE ARTHROSCOPY      right    HX KNEE REPLACEMENT      bilateral    HX ORTHOPAEDIC      left knee revision     Current Outpatient Medications   Medication Sig Dispense Refill    cetirizine (ZYRTEC) 10 mg tablet Take 10 mg by mouth.  traZODone (DESYREL) 100 mg tablet Take 200 mg by mouth.  topiramate (TOPAMAX) 100 mg tablet Take  by mouth two (2) times a day.  buPROPion XL (WELLBUTRIN XL) 150 mg tablet Take 150 mg by mouth every morning.  hydrOXYzine HCl (ATARAX) 25 mg tablet Take  by mouth three (3) times daily as needed for Itching.  naproxen (NAPROSYN) 500 mg tablet Take 500 mg by mouth two (2) times daily (with meals).  vilazodone (VIIBRYD) 40 mg tab tablet Take  by mouth daily.           Review of Symptoms:       General - No history or complaints of unexpected fever or chills  Head/Neck - No history or complaints of headache or dizziness  Cardiac - No history or complaints of chest pain, palpitations, or shortness of breath  Pulmonary - No history or complaints of shortness of breath or productive cough  Gastrointestinal - as noted above  Genitourinary - No history or complaints of hematuria/dysuria or renal lithiasis  Musculoskeletal - No history or complaints of joint  muscular weakness  Hematologic - No history of any bleeding episodes  Neurologic - No history or complaints of  migraine headaches or neurologic symptoms                     Objective:     Visit Vitals  /89 (BP 1 Location: Right arm, BP Patient Position: Sitting)   Pulse 89   Temp 98.4 °F (36.9 °C)   Ht 5' 4\" (1.626 m)   Wt 86.2 kg (190 lb)   SpO2 100%   BMI 32.61 kg/m²        Physical Exam:    General:  alert, cooperative, no distress, appears stated age   Lungs:   clear to auscultation bilaterally   Heart:  Regular rate and rhythm, S1S2 present or without murmur or extra heart sounds   Abdomen:   abdomen is soft without significant tenderness, masses, organomegaly or guarding; Incisions: Well healed       Lab Results   Component Value Date/Time    WBC 4.2 (L) 10/08/2019 11:50 AM    HGB 11.7 (L) 10/08/2019 11:50 AM    HCT 36.2 10/08/2019 11:50 AM    PLATELET 401 [birth year]1959 11:50 AM    MCV 82.3 10/08/2019 11:50 AM     Lab Results   Component Value Date/Time    Sodium 140 10/08/2019 11:50 AM    Potassium 3.8 10/08/2019 11:50 AM    Chloride 109 10/08/2019 11:50 AM    CO2 25 10/08/2019 11:50 AM    Anion gap 6 10/08/2019 11:50 AM    Glucose 94 10/08/2019 11:50 AM    BUN 8 10/08/2019 11:50 AM    Creatinine 0.66 10/08/2019 11:50 AM    BUN/Creatinine ratio 12 10/08/2019 11:50 AM    GFR est AA >60 10/08/2019 11:50 AM    GFR est non-AA >60 10/08/2019 11:50 AM    Calcium 8.4 (L) 10/08/2019 11:50 AM    Bilirubin, total 0.3 10/08/2019 11:50 AM    AST (SGOT) 9 (L) 10/08/2019 11:50 AM    Alk.  phosphatase 50 10/08/2019 11:50 AM    Protein, total 7.2 10/08/2019 11:50 AM    Albumin 3.6 10/08/2019 11:50 AM    Globulin 3.6 10/08/2019 11:50 AM    A-G Ratio 1.0 10/08/2019 11:50 AM    ALT (SGPT) 17 10/08/2019 11:50 AM     Lab Results   Component Value Date/Time    Iron 36 08/14/2017 12:00 AM    Ferritin 12 (L) 08/14/2017 12:00 AM     Lab Results   Component Value Date/Time    Folate 5.4 08/14/2017 12:00 AM     Lab Results   Component Value Date/Time    Vitamin D 25-Hydroxy 16.3 (L) 05/27/2016 12:09 PM    VITAMIN D, 25-HYDROXY 16.2 (L) 08/14/2017 12:00 AM           Assessment:     1. History of Morbid obesity, status post  laparoscopic sleeve gastrectomy. The patient is doing well from a surgical standpoint. Am concerned about protein/calorie deficit and recommend dietitian appt. Check labs. 2. HTN - off Rx, f/u PCP  3. Migraine - on Rx, f/u PCP, going to ED from office and escorted by our nurse for acute pain control  4. Hypovitaminosis D - stopped supplementation, check lab  5. Vaping with nicotine - discussed dangers, and greater than 5 minutes spent on smoking cessation  6. NSAID use - discussed lowest amount, shortest duration with food, discussed risk of ulceration/perforation      Plan:     1. Remember to measure portions, continue low carbohydrate diet  2. Continue to concentrate on protein intake meeting daily requirements  3. Remember vitamin supplements. The importance of such was discussed regarding the malabsorptive issues that the surgery creates. 4. Exercise regimen appears to be: adequate  5. Try and attend support group if feasible. 6. Follow-up in 3 month(s). 7. Lab reviewed and appropriate changes made. 8. Total time spent with the patient 30 minutes.

## 2019-10-08 NOTE — ED TRIAGE NOTES
Patient reports migraine starting Sunday morning around 0330. Patient reports has not taken medication today since she was coming in to ED.

## 2019-10-08 NOTE — ED NOTES
Discharge instructions reviewed with pt, pt verbalizes understanding, discharged in stable condition

## 2019-10-08 NOTE — PROGRESS NOTES
Michelle Still presents today for   Chief Complaint   Patient presents with    Follow-up     Pt is here today for her follow up. Pt stated that she has a very bad headache and she is going to the ER after this visit. Pt also stated that her left eye is red because of her headache. Is someone accompanying this pt? no    Is the patient using any DME equipment during [address]? no    Depression Screening:  3 most recent PHQ Screens 10/8/2019   Little interest or pleasure in doing things Not at all   Feeling down, depressed, irritable, or hopeless Not at all   Total Score PHQ 2 0       Learning Assessment:  Learning Assessment 9/24/2013   PRIMARY LEARNER Patient   PRIMARY LANGUAGE ENGLISH   LEARNER PREFERENCE PRIMARY DEMONSTRATION   ANSWERED BY patient   RELATIONSHIP SELF       Abuse Screening:  Abuse Screening Questionnaire 10/8/2019   Do you ever feel afraid of your partner? N   Are you in a relationship with someone who physically or mentally threatens you? N   Is it safe for you to go home? Y       Fall Risk  No flowsheet data found. Coordination of Care:  1. Have you been to the ER, urgent care clinic since your last visit? Hospitalized since your last visit? no    2. Have you seen or consulted any other health care providers outside of the [address] [place] [address] since your last visit? Include any pap smears or colon screening.  no

## 2019-10-09 ENCOUNTER — TELEPHONE (OUTPATIENT)
Dept: SURGERY | Age: 59
End: 2019-10-09

## 2019-10-09 NOTE — TELEPHONE ENCOUNTER
RN left patient a VM message following up with her in re: to her ED visit yesterday. RN requested a return call.

## 2019-10-14 ENCOUNTER — CLINICAL SUPPORT (OUTPATIENT)
Dept: SURGERY | Age: 59
End: 2019-10-14

## 2019-10-14 VITALS — BODY MASS INDEX: 31.58 KG/M2 | WEIGHT: 185 LBS | HEIGHT: 64 IN

## 2019-10-14 DIAGNOSIS — Z98.84 S/P LAPAROSCOPIC SLEEVE GASTRECTOMY: Primary | ICD-10-CM

## 2019-10-17 RX ORDER — ERGOCALCIFEROL 1.25 MG/1
50000 CAPSULE ORAL
Qty: 52 CAP | Refills: 0 | Status: SHIPPED | OUTPATIENT
Start: 2019-10-17 | End: 2020-10-09

## 2020-01-08 DIAGNOSIS — M19.90 OSTEOARTHRITIS, UNSPECIFIED OSTEOARTHRITIS TYPE, UNSPECIFIED SITE: ICD-10-CM

## 2020-01-08 DIAGNOSIS — I10 ESSENTIAL HYPERTENSION: ICD-10-CM

## 2020-01-08 DIAGNOSIS — Z98.84 S/P LAPAROSCOPIC SLEEVE GASTRECTOMY: ICD-10-CM

## 2020-01-08 DIAGNOSIS — K90.9 INTESTINAL MALABSORPTION, UNSPECIFIED TYPE: ICD-10-CM

## 2020-01-08 DIAGNOSIS — E78.00 HYPERCHOLESTEREMIA: ICD-10-CM

## 2020-03-28 ENCOUNTER — APPOINTMENT (OUTPATIENT)
Dept: GENERAL RADIOLOGY | Age: 60
End: 2020-03-28
Attending: EMERGENCY MEDICINE
Payer: MEDICARE

## 2020-03-28 ENCOUNTER — HOSPITAL ENCOUNTER (EMERGENCY)
Age: 60
Discharge: HOME | End: 2020-03-28
Attending: EMERGENCY MEDICINE
Payer: MEDICARE

## 2020-03-28 VITALS
DIASTOLIC BLOOD PRESSURE: 74 MMHG | HEART RATE: 72 BPM | BODY MASS INDEX: 30.73 KG/M2 | TEMPERATURE: 98.3 F | SYSTOLIC BLOOD PRESSURE: 123 MMHG | OXYGEN SATURATION: 99 % | HEIGHT: 64 IN | RESPIRATION RATE: 16 BRPM | WEIGHT: 180 LBS

## 2020-03-28 DIAGNOSIS — Z96.652 H/O TOTAL KNEE REPLACEMENT, LEFT: ICD-10-CM

## 2020-03-28 DIAGNOSIS — S70.00XA CONTUSION OF HIP, UNSPECIFIED LATERALITY, INITIAL ENCOUNTER: Primary | ICD-10-CM

## 2020-03-28 DIAGNOSIS — M54.32 LEFT SCIATIC NERVE PAIN: ICD-10-CM

## 2020-03-28 PROCEDURE — 99283 EMERGENCY DEPT VISIT LOW MDM: CPT

## 2020-03-28 PROCEDURE — 96372 THER/PROPH/DIAG INJ SC/IM: CPT

## 2020-03-28 PROCEDURE — 73502 X-RAY EXAM HIP UNI 2-3 VIEWS: CPT

## 2020-03-28 PROCEDURE — 74011250637 HC RX REV CODE- 250/637: Performed by: EMERGENCY MEDICINE

## 2020-03-28 PROCEDURE — 73590 X-RAY EXAM OF LOWER LEG: CPT

## 2020-03-28 PROCEDURE — 74011250636 HC RX REV CODE- 250/636: Performed by: EMERGENCY MEDICINE

## 2020-03-28 PROCEDURE — 73552 X-RAY EXAM OF FEMUR 2/>: CPT

## 2020-03-28 RX ORDER — CARVEDILOL 25 MG/1
25 TABLET ORAL AS NEEDED
Status: ON HOLD | COMMUNITY
End: 2022-03-01

## 2020-03-28 RX ORDER — CARISOPRODOL 350 MG/1
350 TABLET ORAL
Qty: 20 TAB | Refills: 1 | Status: SHIPPED | OUTPATIENT
Start: 2020-03-28 | End: 2021-11-10

## 2020-03-28 RX ORDER — KETOROLAC TROMETHAMINE 30 MG/ML
60 INJECTION, SOLUTION INTRAMUSCULAR; INTRAVENOUS
Status: COMPLETED | OUTPATIENT
Start: 2020-03-28 | End: 2020-03-28

## 2020-03-28 RX ORDER — PREDNISONE 20 MG/1
40 TABLET ORAL DAILY
Qty: 10 TAB | Refills: 0 | Status: SHIPPED | OUTPATIENT
Start: 2020-03-28 | End: 2020-04-02

## 2020-03-28 RX ORDER — HYDROMORPHONE HYDROCHLORIDE 2 MG/1
2 TABLET ORAL
Status: COMPLETED | OUTPATIENT
Start: 2020-03-28 | End: 2020-03-28

## 2020-03-28 RX ADMIN — HYDROMORPHONE HYDROCHLORIDE 2 MG: 2 TABLET ORAL at 05:36

## 2020-03-28 RX ADMIN — KETOROLAC TROMETHAMINE 60 MG: 30 INJECTION, SOLUTION INTRAMUSCULAR at 05:36

## 2020-03-28 NOTE — ED TRIAGE NOTES
Pt reports to ED c/c left hip pain. Pt reports missing step at home and twisting body when grabbing onto railing.

## 2020-03-28 NOTE — ED NOTES
Pt discharged home stable and ambulatory. Pain level at discharge 4/10. Pt discharged with boyfriend. Reviewed discharged instructions with pt who verbalized understanding.   Patient armband removed and shredded  Written rx x's 2

## 2020-03-28 NOTE — DISCHARGE INSTRUCTIONS
Patient Education        Contusion: Care Instructions  Your Care Instructions    Contusion is the medical term for a bruise. It is the result of a direct blow or an impact, such as a fall. Contusions are common sports injuries. Most people think of a bruise as a black-and-blue spot. This happens when small blood vessels get torn and leak blood under the skin. But bones, muscles, and organs can also get bruised. This may damage deep tissues but not cause a bruise you can see. The doctor will do a physical exam to find the location of your contusion. You may also have tests to make sure you do not have a more serious injury, such as a broken bone or nerve damage. These may include X-rays or other imaging tests like a CT scan or MRI. Deep-tissue contusions may cause pain and swelling. But if there is no serious damage, they will often get better in a few weeks with home treatment. The doctor has checked you carefully, but problems can develop later. If you notice any problems or new symptoms, get medical treatment right away. Follow-up care is a key part of your treatment and safety. Be sure to make and go to all appointments, and call your doctor if you are having problems. It's also a good idea to know your test results and keep a list of the medicines you take. How can you care for yourself at home? · Put ice or a cold pack on the sore area for 10 to 20 minutes at a time to stop swelling. Put a thin cloth between the ice pack and your skin. · Be safe with medicines. Read and follow all instructions on the label. ? If the doctor gave you a prescription medicine for pain, take it as prescribed. ? If you are not taking a prescription pain medicine, ask your doctor if you can take an over-the-counter medicine. · If you can, prop up the sore area on pillows as much as possible for the next few days. Try to keep the sore area above the level of your heart. When should you call for help?   Call your doctor now or seek immediate medical care if:    · Your pain gets worse.     · You have new or worse swelling.     · You have tingling, weakness, or numbness in the area near the contusion.     · The area near the contusion is cold or pale.    Watch closely for changes in your health, and be sure to contact your doctor if:    · You do not get better as expected. Where can you learn more? Go to http://alok-shashank.info/  Enter [id number] in the search box to learn more about \"Contusion: Care Instructions. \"  Current as of: June 26, 2019Content Version: 12.4  © 6009-5964 [employer]. Care instructions adapted under license by [employer] (which disclaims liability or warranty for this information). If you have questions about a medical condition or this instruction, always ask your healthcare professional. [address] any warranty or liability for your use of this information. Sciatica: Care Instructions  Your Care Instructions    Sciatica (say \"mee-YO-ma-kuh\") is an irritation of one of the sciatic nerves, which come from the spinal cord in the lower back. The sciatic nerves and their branches extend down through the buttock to the foot. Sciatica can develop when an injured disc in the back presses against a spinal nerve root. Its main symptom is pain, numbness, or weakness that is often worse in the leg or foot than in the back. Sciatica often will improve and go away with time. Early treatment usually includes medicines and exercises to relieve pain. Follow-up care is a key part of your treatment and safety. Be sure to make and go to all appointments, and call your doctor if you are having problems. It's also a good idea to know your test results and keep a list of the medicines you take. How can you care for yourself at home? · Take pain medicines exactly as directed.   ? If the doctor gave you a prescription medicine for pain, take it as prescribed. ? If you are not taking a prescription pain medicine, ask your doctor if you can take an over-the-counter medicine. · Use heat or ice to relieve pain. ? To apply heat, put a warm water bottle, heating pad set on low, or warm cloth on your back. Do not go to sleep with a heating pad on your skin. ? To use ice, put ice or a cold pack on the area for 10 to 20 minutes at a time. Put a thin cloth between the ice and your skin. · Avoid sitting if possible, unless it feels better than standing. · Alternate lying down with short walks. Increase your walking distance as you are able to without making your symptoms worse. · Do not do anything that makes your symptoms worse. When should you call for help? Call 911 anytime you think you may need emergency care. For example, call if:    · You are unable to move a leg at all.   [facility] your doctor now or seek immediate medical care if:    · You have new or worse symptoms in your legs or buttocks. Symptoms may include:  ? Numbness or tingling. ? Weakness. ? Pain.     · You lose bladder or bowel control.    Watch closely for changes in your health, and be sure to contact your doctor if:    · You are not getting better as expected. Where can you learn more? Go to http://alok-shashank.info/  Enter Z239 in the search box to learn more about \"Sciatica: Care Instructions. \"  Current as of: June 26, 2019Content Version: 12.4  © 8453-7987 [employer]. Care instructions adapted under license by [employer] (which disclaims liability or warranty for this information). If you have questions about a medical condition or this instruction, always ask your healthcare professional. [place] [address] any warranty or liability for your use of this information.             Hip Strain: Care Instructions  Overview    A hip strain happens when you overstretch or tear one of the muscles or tendons that support the hip.  Tight muscles around the hip make a strain more likely. Hip strains might be caused by overuse from sports, everyday tasks, or falls. Pain or problems with other joints, like knees and ankles, may change the way you walk. This can also strain the muscles or tendons that support the hip. A hip strain may make your hip feel tight or tender. Your hip may have a limited range of motion. Most minor hip strains get better with simple self-care. Follow-up care is a key part of your treatment and safety. Be sure to make and go to all appointments, and call your doctor if you are having problems. It's also a good idea to know your test results and keep a list of the medicines you take. How can you care for yourself at home? · If your doctor gave you crutches or a walker, use them as directed. · Rest and protect your hip. Try to stop or reduce any action that causes pain. · Put ice or a cold pack on your hip for 10 to 20 minutes at a time. Try to do this every 1 to 2 hours for the next 3 days (when you are awake) or until the swelling goes down. Put a thin cloth between the ice and your skin. · Be safe with medicines. Read and follow all instructions on the label. ? If the doctor gave you a prescription medicine for pain, take it as prescribed. ? If you are not taking a prescription pain medicine, ask your doctor if you can take an over-the-counter medicine. · For the first day or two after an injury, avoid things that might increase swelling, such as hot showers, hot tubs, or hot packs. · After 2 to 3 days, put a heating pad (set on low) or warm moist cloth on your hip before you do light stretches. · Do exercises to make your hip stronger, as directed by your doctor or physical therapist.  · Return to your usual level of activity as your hip gets better. When should you call for help?   Call your doctor now or seek immediate medical care if:    · Your pain is worse.     · You cannot walk or stand without help.     · You have signs of infection, such as a fever or increased pain, swelling, redness, or warmth in your hip.     · You have signs of a blood clot, such as:  ? Pain in your calf, back of the knee, thigh, or groin. ? Redness and swelling in your leg or groin.     · You have tingling, weakness, or numbness in your leg, foot, or toes.    Watch closely for changes in your health, and be sure to contact your doctor if:    · Your pain does not get better in 2 or 3 days.     · You still have pain after 2 weeks. Current as of: June 26, 2019Content Version: 12.4  © 3587-4232 [employer]. Care instructions adapted under license by [employer] (which disclaims liability or warranty for this information). If you have questions about a medical condition or this instruction, always ask your healthcare professional. [address] any warranty or liability for your use of this information.

## 2020-03-28 NOTE — ED PROVIDER NOTES
EMERGENCY DEPARTMENT HISTORY AND PHYSICAL EXAM    Date: 3/28/2020  Patient Name: Chloe Villanueva    History of Presenting Illness     Chief Complaint   Patient presents with   [facility] Fall    Hip Pain         History Provided By: Patient    Additional History (Context):     5:09 AM    Chloe Villanueva is a 61 y.o. female with pertinent PMHx of HTN, HLD, anemia, and sickle cell trait presenting ambulatory to the ED c/o 10/10 left hip pain s/p mechanical fall x 7pm last night. Pt states that she tried to sleep and take Excedrin, with no relief. Pain increases with weight bearing, and radiates down her leg. Pt states that she was walking down some stairs when she tripped and \"twisted really bad\". Pt denies any loss of consciousness, head injury, inability to ambulate, dizziness, lightheadedness, vision changes, N/V, weakness or episodes of urinary/fecal incontinence before/during/after her fall. Pt notes that she has had a similar injury in the past, resulting in breaking her femur. Pt denies any new allergies to medications. Pt denies any h/o kidney disease. Pt denies any h/o RA or lupus. PCP: Nikita Cantu, DO  Pt does not drink EtOH excessively or do illicit drugs. Pt does admit to smoking tobacco.    There are no other complaints, changes, or physical findings at this time. Current Outpatient Medications   Medication Sig Dispense Refill    carvediloL (Coreg) 25 mg tablet Take 25 mg by mouth two (2) times daily (with meals).  predniSONE (DELTASONE) 20 mg tablet Take 40 mg by mouth daily for 5 days. With Breakfast 10 Tab 0    carisoprodoL (Soma) 350 mg tablet Take 1 Tab by mouth every eight (8) hours as needed for Muscle Spasm(s). Max Daily Amount: 1,050 mg. 20 Tab 1    ergocalciferol (VITAMIN D2) 50,000 unit capsule Take 1 Cap by mouth every seven (7) days for 52 doses. 52 Cap 0    traZODone (DESYREL) 100 mg tablet Take 200 mg by mouth.       topiramate (TOPAMAX) 100 mg tablet Take  by mouth two (2) times a day.  naproxen (NAPROSYN) 500 mg tablet Take 500 mg by mouth two (2) times daily (with meals).  vilazodone (VIIBRYD) 40 mg tab tablet Take  by mouth daily.  cetirizine (ZYRTEC) 10 mg tablet Take 10 mg by mouth.  buPROPion XL (WELLBUTRIN XL) 150 mg tablet Take 150 mg by mouth every morning.  hydrOXYzine HCl (ATARAX) 25 mg tablet Take  by mouth three (3) times daily as needed for Itching. Past History     Past Medical History:  Past Medical History:   Diagnosis Date    Anemia     Arthritic-like pain     Arthritis     Binge-purge behavior     Pt admits to binge eating and purging 3 times in the last four months.     Bipolar disorder ([facility] [address]     CAD (coronary artery disease) 2004    questionable hx of MI    Depression     Hx of smoking     Hypercholesteremia     Hypertension     Menopause     Migraine     Morbid obesity (HCC)     Postmenopausal     Sickle cell trait ([facility] [address]        Past Surgical History:  Past Surgical History:   Procedure Laterality Date    DELIVERY   18    HX CHOLECYSTECTOMY      laparoscopic    HX CYST INCISION AND DRAINAGE      HX FEMUR FRACTURE TX      HX GI  2013    sleeve resection    HX KNEE ARTHROSCOPY  2010    right    HX KNEE REPLACEMENT  2011    bilateral    HX ORTHOPAEDIC      left knee revision       Family History:  Family History   Problem Relation Age of Onset    Heart Disease Mother         Mom was told she had irreg heart beat/hole in [place], [place] Breast Cancer Mother     Other Other         Non- contributory    Breast Cancer Maternal Grandmother     Malignant Hyperthermia Neg Hx     Pseudocholinesterase Deficiency Neg Hx     Delayed Awakening Neg Hx     Post-op Nausea/Vomiting Neg Hx     Emergence Delirium Neg Hx     Post-op Cognitive Dysfunction Neg Hx        Social History:  Social History     Tobacco Use    Smoking status: Former Smoker     Packs/day: 0.25     Years: 35.00 Pack years: 8.75     Types: Cigarettes     Last attempt to quit: 1/10/2017     Years since quitting: 3.2    Smokeless tobacco: Never Used    Tobacco comment: states she smokes e-cigarettes   Substance Use Topics    Alcohol use: Yes     Alcohol/week: 0.8 standard drinks     Types: 1 Standard drinks or equivalent per week    Drug use: No       Allergies: Allergies   Allergen Reactions    Percocet [Oxycodone-Acetaminophen] Itching         Review of Systems   Review of Systems   Eyes: Negative for visual disturbance. Gastrointestinal: Negative for nausea and vomiting. Musculoskeletal: Positive for arthralgias (left hip). Neurological: Negative for dizziness, weakness, light-headedness and headaches. Negative for urinary/fecal incontinence     All other systems reviewed and are negative. Physical Exam     Vitals:    03/28/20 0504   BP: 118/78   Pulse: 71   Resp: 16   Temp: 98.3 °F (36.8 °C)   SpO2: 100%   Weight: 81.6 kg (180 lb)   Height: 5' 4\" (1.626 m)     Physical Exam  Vitals signs and nursing note reviewed. Constitutional:       Appearance: She is well-developed. She is not diaphoretic. Comments: Well appearing  Nontoxic   HENT:      Head: Normocephalic and atraumatic. Right Ear: External ear normal.      Left Ear: External ear normal.      Mouth/Throat:      Pharynx: No oropharyngeal exudate. Eyes:      General: No scleral icterus. Conjunctiva/sclera: Conjunctivae normal.      Pupils: Pupils are equal, round, and reactive to light. Comments: No pallor   Neck:      Musculoskeletal: Normal range of motion and neck supple. Thyroid: No thyromegaly. Vascular: No JVD. Trachea: No tracheal deviation. Cardiovascular:      Rate and Rhythm: Normal rate and regular rhythm. Heart sounds: Normal heart sounds. Pulmonary:      Effort: Pulmonary effort is normal. No respiratory distress. Breath sounds: Normal breath sounds. No stridor.    Abdominal: General: Bowel sounds are normal. There is no distension. Palpations: Abdomen is soft. Tenderness: There is no abdominal tenderness. There is no guarding or rebound. Musculoskeletal: Normal range of motion. General: Tenderness present. Comments: No tenderness with internal/external rotation, but pain with axial loading  TTP from her mid calf, tibial surface, all the way to her hip  More severe TTP to the medial thigh   Lymphadenopathy:      Cervical: No cervical adenopathy. Skin:     General: Skin is warm and dry. Findings: No erythema or rash. Neurological:      Mental Status: She is alert and oriented to person, place, and time. Cranial Nerves: No cranial nerve deficit. Coordination: Coordination normal.      Deep Tendon Reflexes: Reflexes are normal and symmetric. Reflexes normal.   Psychiatric:         Behavior: Behavior normal.         Thought Content: Thought content normal.         Judgment: Judgment normal.         Diagnostic Study Results     Labs -   No results found for this or any previous visit (from the past 12 hour(s)). Radiologic Studies -   XR TIB/FIB LT   Final Result   Impression:   -------------      No acute osseous abnormality. XR HIP LT W OR WO PELV 2-3 VWS   Final Result   Impression:   -------------      1. No significant abnormalities. XR FEMUR LT 2 V   Final Result   Impression:   --------------      No significant abnormalities. 6:40 AM  RADIOLOGY FINDINGS  Left tib/fib X-ray shows no acute process. Surgical hardware from her knee surgery remains in tact. Pending review by Radiologist  Recorded by KARTIK aragon, as dictated by Comfort Puga. Charlotte Carmichael MD     6:40 AM  RADIOLOGY FINDINGS  Left femur X-ray shows no acute process. Relatively well preserved bone density. Pending review by Radiologist  Recorded by KARTIK Weaver, as dictated by Comfort Puga.  Charlotte Carmichael MD     6:40 AM  RADIOLOGY FINDINGS  Left hip X-ray shows no acute process. Pending review by Radiologist  Recorded by KARTIK Marie, as dictated by Fercho Hale. Calvin Mcnulty MD      Medical Decision Making   I am the first provider for this patient. I reviewed the vital signs, available nursing notes, past medical history, past surgical history, family history and social history. Vital Signs-Reviewed the patient's vital signs. Pulse Oximetry Analysis - 100% on RA      Records Reviewed: Nursing Notes and Old Medical Records    Provider Notes (Medical Decision Making): Most likely contusion. Will shoot XR for fracture or dislocation. Unlikely rhabdo. PROCEDURES:  Procedures    MEDICATIONS GIVEN IN THE ED:  Medications   ketorolac tromethamine (TORADOL) 60 mg/2 mL injection 60 mg (60 mg IntraMUSCular Given 3/28/20 0536)   HYDROmorphone (DILAUDID) tablet 2 mg (2 mg Oral Given 3/28/20 0536)        ED COURSE:   5:09 AM   Initial assessment performed. PROGRESS NOTE:  6:48 AM  Pt and/or family have been updated on their results. Pt and/or pt's family are aware of the plan of care and are in agreement. Written by KARTIK Trimble, as dictated by Riley Mcnulty MD.      Diagnosis and Disposition       DISCHARGE NOTE:  6:49 AM  The patient is ready for discharge. The patient's signs, symptoms, diagnosis, and discharge instructions have been discussed and the patient and/or family has conveyed their understanding. The patient and/or family is to follow up as recommended or return to the ER should their symptoms worsen. Plan has been discussed and the patient and/or family is in agreement. Written by KARTIK Trimble, as dictated by Riley Mcnulty MD.     CLINICAL IMPRESSION:  1. Contusion of hip, unspecified laterality, initial encounter    2. Left sciatic nerve pain    3. H/O total knee replacement, left        PLAN:  1. D/C Home  2.    Current Discharge Medication List      START taking these medications    Details predniSONE (DELTASONE) 20 mg tablet Take 40 mg by mouth daily for 5 days. With Breakfast  Qty: 10 Tab, Refills: 0      carisoprodoL (Soma) 350 mg tablet Take 1 Tab by mouth every eight (8) hours as needed for Muscle Spasm(s). Max Daily Amount: 1,050 mg.  Qty: 20 Tab, Refills: 1    Associated Diagnoses: Contusion of hip, unspecified laterality, initial encounter; Left sciatic nerve pain; H/O total knee replacement, left         CONTINUE these medications which have NOT CHANGED    Details   carvediloL (Coreg) 25 mg tablet Take 25 mg by mouth two (2) times daily (with meals). ergocalciferol (VITAMIN D2) 50,000 unit capsule Take 1 Cap by mouth every seven (7) days for 52 doses. Qty: 52 Cap, Refills: 0      traZODone (DESYREL) 100 mg tablet Take 200 mg by mouth. topiramate (TOPAMAX) 100 mg tablet Take  by mouth two (2) times a day. naproxen (NAPROSYN) 500 mg tablet Take 500 mg by mouth two (2) times daily (with meals). vilazodone (VIIBRYD) 40 mg tab tablet Take  by mouth daily. cetirizine (ZYRTEC) 10 mg tablet Take 10 mg by mouth. buPROPion XL (WELLBUTRIN XL) 150 mg tablet Take 150 mg by mouth every morning.      hydrOXYzine HCl (ATARAX) 25 mg tablet Take  by mouth three (3) times daily as needed for Itching. 3.   Follow-up Information     Follow up With Specialties Details Why Contact Info    [address], Radha Arnoldo, DO Internal Medicine Schedule an appointment as soon as possible for a visit for primary care follow up Yamil Jimenez [facility] 2.  [phone]      Adolfo Dobson MD Orthopedic Surgery Schedule an appointment as soon as possible for a visit for ortho follow [facility] [address] [facility]      [facility] EMERGENCY DEPT Emergency Medicine  As needed, If symptoms worsen [address] [place] [address]  [phone]        _______________________________    Attestations:   This note is prepared by Dorothy Cordova, acting as Scribe for Khalif Larsen. Clint Brian MD.    Khalif Larsen. Clint Brian MD:  The scribe's documentation has been prepared under my direction and personally reviewed by me in its entirety.  I confirm that the note above accurately reflects all work, treatment, procedures, and medical decision making performed by me.  _______________________________

## 2020-08-19 ENCOUNTER — DOCUMENTATION ONLY (OUTPATIENT)
Dept: SURGERY | Age: 60
End: 2020-08-19

## 2020-08-19 NOTE — PROGRESS NOTES
Per [facility] requirements;  E-mail and letter sent for follow up appointment. [facility] Surgical Specialist  [address] [place], [address] [facility] Weight [facility] [place]  [facility] Surgical Specialists  [facility]      Dear Patient,    Your health is our main concern. It is important for your health to have follow-up lab work and to see your surgeon at 2 months, 4 months, 6 months, 9 months and annually after your weight loss surgery. Additionally, the Department of Bariatric Surgery at our hospital is a member of the [employer] of [address] [place] [address] Surgical Quality Improvement Program ([facility] NSQIP). As a participant in this program, we gather information on the outcomes of our patients after surgery. Please call the office for a follow up appointment at [phone]. If you have moved out of the area or have changed surgeons please call us and let us know the name of your doctor. Your health and feedback are important to us. We greatly appreciate your response.        Thank you,  [facility] [place] [facility] [address] [facility]

## 2020-08-19 NOTE — LETTER
[facility] Surgical Specialist 
[address] [place], [address] [facility] Loss [facility] Surgical Specialists [facility] 
 
 
Dear Patient, Your health is our main concern. It is important for your health to have follow-up lab work and to see your surgeon at 2 months, 4 months, 6 months, 9 months and annually after your weight loss surgery. Additionally, the Department of Bariatric Surgery at our hospital is a member of the [employer] [address] Surgical Quality Improvement Program ([facility] NSQIP). As a participant in this program, we gather information on the outcomes of our patients after surgery. Please call the office for a follow up appointment at [phone]. If you have moved out of the area or have changed surgeons please call us and let us know the name of your doctor. Your health and feedback are important to us. We greatly appreciate your response. Thank you, [facility]

## 2020-12-03 ENCOUNTER — TRANSCRIBE ORDER (OUTPATIENT)
Dept: SCHEDULING | Age: 60
End: 2020-12-03

## 2020-12-03 DIAGNOSIS — R41.3 MEMORY LOSS: Primary | ICD-10-CM

## 2021-03-19 ENCOUNTER — HOSPITAL ENCOUNTER (OUTPATIENT)
Dept: LAB | Age: 61
Discharge: HOME | End: 2021-03-19

## 2021-03-19 LAB — XX-LABCORP SPECIMEN COL,LCBCF: NORMAL

## 2021-03-19 PROCEDURE — 99001 SPECIMEN HANDLING PT-LAB: CPT

## 2021-04-01 ENCOUNTER — HOSPITAL ENCOUNTER (OUTPATIENT)
Dept: MRI IMAGING | Age: 61
Discharge: HOME | End: 2021-04-01
Attending: PSYCHIATRY & NEUROLOGY
Payer: MEDICARE

## 2021-04-01 DIAGNOSIS — R41.3 MEMORY LOSS: ICD-10-CM

## 2021-04-01 PROCEDURE — 70551 MRI BRAIN STEM W/O DYE: CPT

## 2021-06-28 ENCOUNTER — DOCUMENTATION ONLY (OUTPATIENT)
Dept: SURGERY | Age: 61
End: 2021-06-28

## 2021-06-28 NOTE — PROGRESS NOTES
Patient called to confirmed the next appointment date and time. Patient stated that she also wanted to have an appointment with Dr. Alissa Robin about having another surgery. Dr. Alissa Robin did a weight loss surgery for her. I informed patient that I didn't believe that the office was scheduling any revisions at this time. Patient voiced understanding.

## 2021-08-16 ENCOUNTER — OFFICE VISIT (OUTPATIENT)
Dept: SURGERY | Age: 61
End: 2021-08-16
Payer: MEDICARE

## 2021-08-16 ENCOUNTER — HOSPITAL ENCOUNTER (OUTPATIENT)
Dept: LAB | Age: 61
Discharge: HOME | End: 2021-08-16

## 2021-08-16 VITALS
HEIGHT: 64 IN | WEIGHT: 211.7 LBS | DIASTOLIC BLOOD PRESSURE: 76 MMHG | HEART RATE: 94 BPM | BODY MASS INDEX: 36.14 KG/M2 | SYSTOLIC BLOOD PRESSURE: 145 MMHG | OXYGEN SATURATION: 98 %

## 2021-08-16 DIAGNOSIS — E87.6 HYPOKALEMIA: ICD-10-CM

## 2021-08-16 DIAGNOSIS — E53.8 LOW VITAMIN B12 LEVEL: ICD-10-CM

## 2021-08-16 DIAGNOSIS — I10 ESSENTIAL HYPERTENSION: ICD-10-CM

## 2021-08-16 DIAGNOSIS — K90.9 INTESTINAL MALABSORPTION, UNSPECIFIED TYPE: Primary | ICD-10-CM

## 2021-08-16 DIAGNOSIS — Z98.84 S/P LAPAROSCOPIC SLEEVE GASTRECTOMY: ICD-10-CM

## 2021-08-16 DIAGNOSIS — E55.9 HYPOVITAMINOSIS D: ICD-10-CM

## 2021-08-16 LAB — XX-LABCORP SPECIMEN COL,LCBCF: NORMAL

## 2021-08-16 PROCEDURE — G8754 DIAS BP LESS 90: HCPCS | Performed by: NURSE PRACTITIONER

## 2021-08-16 PROCEDURE — G8417 CALC BMI ABV UP PARAM F/U: HCPCS | Performed by: NURSE PRACTITIONER

## 2021-08-16 PROCEDURE — G8753 SYS BP > OR = 140: HCPCS | Performed by: NURSE PRACTITIONER

## 2021-08-16 PROCEDURE — 3017F COLORECTAL CA SCREEN DOC REV: CPT | Performed by: NURSE PRACTITIONER

## 2021-08-16 PROCEDURE — G8427 DOCREV CUR MEDS BY ELIG CLIN: HCPCS | Performed by: NURSE PRACTITIONER

## 2021-08-16 PROCEDURE — 99001 SPECIMEN HANDLING PT-LAB: CPT

## 2021-08-16 PROCEDURE — G9717 DOC PT DX DEP/BP F/U NT REQ: HCPCS | Performed by: NURSE PRACTITIONER

## 2021-08-16 PROCEDURE — 99214 OFFICE O/P EST MOD 30 MIN: CPT | Performed by: NURSE PRACTITIONER

## 2021-08-16 RX ORDER — CYANOCOBALAMIN 1000 UG/ML
1000 INJECTION, SOLUTION INTRAMUSCULAR; SUBCUTANEOUS
Qty: 30 ML | Refills: 0 | Status: SHIPPED | OUTPATIENT
Start: 2021-08-16 | End: 2022-09-01

## 2021-08-16 RX ORDER — RIMEGEPANT SULFATE 75 MG/75MG
TABLET, ORALLY DISINTEGRATING ORAL
COMMUNITY
Start: 2021-08-14 | End: 2022-02-22

## 2021-08-16 RX ORDER — ERENUMAB-AOOE 140 MG/ML
140 INJECTION, SOLUTION SUBCUTANEOUS
COMMUNITY
Start: 2021-08-14 | End: 2022-03-02

## 2021-08-16 RX ORDER — BISMUTH SUBSALICYLATE 262 MG
1 TABLET,CHEWABLE ORAL DAILY
COMMUNITY
End: 2022-06-15

## 2021-08-16 RX ORDER — ERGOCALCIFEROL (VITAMIN D2) 10 MCG
1 TABLET ORAL DAILY
COMMUNITY
End: 2021-08-23 | Stop reason: ALTCHOICE

## 2021-08-16 NOTE — PATIENT INSTRUCTIONS
Baritastic Ant    80-90g protein  800 minumum  Keep carbs bleow 50g                                                                Patient Instructions      1. Remember hydration goals - minimum of 64 ounces of liquids per day (dehydration is the number one reason for hospital readmission). 2. Continue to monitor carbohydrate and protein intake you need a minimum of  Grams of protein daily- remember to keep your total carbohydrates to 50 grams or less per day for best results. 3. Continue to work towards exercise goals - 60-90 minutes, 5 times a week minimum of deliberate, aerobic exercise is the ultimate goal with strength training 2 times each week. Refer to [employer] for  information. 4. Remember to take vitamins as directed. 5. Attend support group the 2nd Thursday of each month. 6.  Constipation: Milk of Magnesia is for immediate relief only. Miralax is to be used every day if constipation is a chronic problem. 7.  Diarrhea: patients will occasionally develop lactose intolerance after surgery. Check to see if your protein shake has whey in it. If it does try a protein powder or drink that does not have whey and stop all yogurts, cheeses and milks to see if the diarrhea goes away. 8.  If you have had labs drawn. We will only call you if you have abnormal results. Otherwise you can access the lab results in \"mychart\". You will only need the access code the first time you sign on. 9.  Call us at [phone] or email us through [employer]" with questions,     concerns or worsening of condition, we have someone on call 24 hours a day. If you are unable to reach our office, you are to go to your Primary Care Physician or the Emergency Department.      NOTE TO GASTRIC BYPASS PATIENTS:  (SAME APPLIES TO GASTRIC SLEEVE PATIENTS FOR FIRST TWO MONTHS)  Remember that for the rest of your life, you are not able to take the following:  - NSAIDs (ibuprofen, goody powder, BC powder, Motrin, Advil, Mobic, Voltaren, Excedrin, etc.)  - Steroid pills or injections  - Smoke (cigarettes or recreational drugs)  - Alcohol  Use of any of the above may cause ulcers in your stomach which may perforate causing a medical emergency and surgery. Speak to our medical staff if another medical provider requires you to take steroids or NSAIDs. Supplement Resource Guide    Importance of Protein:   Maintains lean body mass, produces antibodies to fight off infections, heals wounds, minimizes hair loss, helps to give you energy, helps with satiety, and keeping you full between meals. Importance of Calcium:  Needed for healthy bones and teeth, normal blood clotting, and nervous system functioning, higher risk of osteoporosis and bone disease with non-compliance. Importance of Multivitamins: Many functions. Supply you with extra nutrients that you may be missing from food. May lead to iron deficiency anemia, weakness, fatigue, and many other symptoms with non-compliance. Importance of B Vitamins:  Important for red blood cell formation, metabolism, energy, and helps to maintain a healthy nervous system. Protein Supplement  Find one you like now. Use immediately after surgery. Look for:  35-50g protein each day from your protein supplement once you reach the progression diet. 0-3 g fat per serving  0-3 g sugar per serving    Protein drinks should be split in separate dosages. Recommend: Lifelong  1 year + Calcium Supplement:     Start taking within a month after surgery. Look for: Calcium Citrate Plus D (1500 mg per day)  Recommend: Citracal     .            Avoid chocolate chewable calcium. Can use chewable bariatric or [employer] brand or similar chewable. The body cannot absorb more than 500-600 mg of calcium at a time. Take for Life Multi-vitamin Supplement:      Start immediately after surgery: any complete chewable, such as: [place]s Complete chewables.     Avoid Tippec[place]oe sours or gummies. They lack iron and other important nutrients and also have added sugar. Continue with chewable vitamin or change to adult complete multivitamin one month after surgery. Menstruating women can take a prenatal vitamin. Make sure has at least 18 mg iron and 842-806 mcg folic acid   Vitamin O56, B Complex Vitamin, and Biotin  Start taking within a month after surgery. Vitamin B12:  1000 mcg of Vitamin B12 three times weekly    Must take sublingually (meaning you take it under your tongue) or in a liquid drop form for easy absorption. B Complex Vitamin: Take a pill or liquid drop form once daily. Biotin: This vitamin can help prevent hair loss. Recommend 5mg   (5000 mcg) a day  Biotin is Optional              Learning About Being Physically Active  What is physical activity? Being physically active means doing any kind of activity that gets your body moving. The types of physical activity that can help you get fit and stay healthy include:  · Aerobic or \"cardio\" activities. These make your heart beat faster and make you breathe harder, such as brisk walking, riding a bike, or running. They strengthen your heart and lungs and build up your endurance. · Strength training activities. These make your muscles work against, or \"resist,\" something. Examples include lifting weights or doing push-ups. These activities help tone and strengthen your muscles and bones. · Stretches. These let you move your joints and muscles through their full range of motion. Stretching helps you be more flexible. What are the benefits of being active? Being active is one of the best things you can do for your health. It helps you to:  · Feel stronger and have more energy to do all the things you like to do. · Focus better at school or work. · Feel, think, and sleep better. · Reach and stay at a healthy weight. · Lose fat and build lean muscle.   · Lower your risk for serious health problems, including diabetes, heart attack, high blood pressure, and some cancers. · Keep your heart, lungs, bones, muscles, and joints strong and healthy. How can you make being active part of your life? Start slowly. Make it your long-term goal to get at least 30 minutes of exercise on most days of the week. Walking is a good choice. You also may want to do other activities, such as running, swimming, cycling, or playing tennis or team sports. Pick activities that you like--ones that make your heart beat faster, your muscles stronger, and your muscles and joints more flexible. If you find more than one thing you like doing, do them all. You don't have to do the same thing every day. Get your heart pumping every day. Any activity that makes your heart beat faster and keeps it at that rate for a while counts. Here are some great ways to get your heart beating faster:  · Go for a brisk walk, run, or bike ride. · Go for a hike or swim. · Go in-line skating. · Play a game of touch football, basketball, or soccer. · Ride a bike. · Play tennis or racquetball. · Climb stairs. Even some household chores can be aerobic--just do them at a faster pace. Vacuuming, raking or mowing the lawn, sweeping the garage, and washing and waxing the car all can help get your heart rate up. Strengthen your muscles during the week. You don't have to lift heavy weights or grow big, bulky muscles to get stronger. Doing a few simple activities that make your muscles work against, or \"resist,\" something can help you get stronger. For example, you can:  · Do push-ups or sit-ups, which use your own body weight as resistance. · Lift weights or dumbbells or use stretch bands at home or in a gym or community center. Stretch your muscles often. Stretching will help you as you become more active. It can help you stay flexible, loosen tight muscles, and avoid injury.  It can also help improve your balance and posture and can be a great way to relax. Be sure to stretch the muscles you'll be using when you work out. It's best to warm your muscles slightly before you stretch them. Walk or do some other light aerobic activity for a few minutes, and then start stretching. When you stretch your muscles:  · Do it slowly. Stretching is not about going fast or making sudden movements. · Don't push or bounce during a stretch. · Hold each stretch for at least 15 to 30 seconds, if you can. You should feel a stretch in the muscle, but not pain. · Breathe out as you do the stretch. Then breathe in as you hold the stretch. Don't hold your breath. If you're worried about how more activity might affect your health, have a checkup before you start. Follow any special advice your doctor gives you for getting a smart start. Where can you learn more? Go to http://www.gray.com/  Enter [id number] in the search box to learn more about \"Learning About Being Physically Active. \"  Current as of: September 10, 2020               Content Version: 12.8  © 2006-2021 Healthwise[employer]. Care instructions adapted under license by [employer] (which disclaims liability or warranty for this information). If you have questions about a medical condition or this instruction, always ask your healthcare professional. [address] any warranty or liability for your use of this information. cigarettes

## 2021-08-16 NOTE — PROGRESS NOTES
Revision Surgery Consultation    Subjective: The patient is a 64 y.o. obese female with a Body mass index is 36.34 kg/m². .  The patient had a laparoscopic sleeve gastrectomy procedure done approximately 8 years ago by Dr. Junior Mcneal. her starting weight prior to surgery was 250 lbs. she ultimately lost approximately 75 lbs with a subsequent weight regain of 36 lbs. her peak EBWL at 2 years out from surgery was 63% and now her current EBWL is 33%. her last bariatric follow-up was 2 years ago with me. Rome Sanabria notes that she had no issues in the immediate post-op phase and had no hospital readmissions in the remote post-op phase. she currently is having the following issues related to his health:weight regain. she is here today to discuss a possible revision of her sleeve gastrectomy because of weight regain. All of their prior evaluations available by both their PCP's and specialists physicians have been reviewed today either in the Care Everywhere portal or scanned under the media tab. I have spent a large portion of my initial consultation today reviewing the patients current dietary habits which have contributed to their health issues, weight regain and  their current obesity. They understand that generally speaking,  weight regain is  a function of resuming less that ideal dietary habits instead of being a procedural issue. They understand that older procedures are more likely to be associated with a less that perfect procedural result, such as a prior vertical banded gastroplasty or non divided gastric bypass. These procedures are more likely to result in staple line failures with resultant weight regain. This has been explained to the patient via diagrams of these older procedures and given to the patient. I have suggested to them personally a dietary regimen that they can initiate now to help with their status as it pertains to their weight.   They understand that the most important aspect of their journey through their weight loss endeavor will be their adherence to a new lifestyle of healthy eating behavior. They also understand that an adherence to an exercise program will not only help with weight loss but is ultimately important in weight maintenance. Patient Active Problem List    Diagnosis Date Noted    Low vitamin B12 level 2021    S/P laparoscopic sleeve gastrectomy 10/08/2019    Hypovitaminosis D 10/08/2019    UTI (lower urinary tract infection) 2015    Osteoarthritis 2015    Hypokalemia 2015    Urinary retention 2015    Fracture of distal femur ([address] 2015    Intestinal malabsorption 2014    H. pylori infection 2014    Chronic gastritis 2014    Hypertension     Hypercholesteremia     Hx of smoking     Migraine     Depression     Arthritic-like pain     CAD (coronary artery disease)     Abnormal EKG 2011      Past Surgical History:   Procedure Laterality Date    DELIVERY       HX CHOLECYSTECTOMY      laparoscopic    HX CYST INCISION AND DRAINAGE      HX FEMUR FRACTURE TX      HX GI  2013    sleeve resection    HX KNEE ARTHROSCOPY      right    HX KNEE REPLACEMENT      bilateral    HX ORTHOPAEDIC  -    left knee revision      Social History     Tobacco Use    Smoking status: Former Smoker     Packs/day: 0.25     Years: 35.00     Pack years: 8.75     Types: Cigarettes     Quit date: 1/10/2017     Years since quittin.6    Smokeless tobacco: Never Used    Tobacco comment: states she smokes e-cigarettes   Substance Use Topics    Alcohol use:  Yes     Alcohol/week: 0.8 standard drinks     Types: 1 Standard drinks or equivalent per week      Family History   Problem Relation Age of Onset    Heart Disease Mother         Mom was told she had irreg heart beat/hole in [facility] [place] Breast Cancer Mother     Other Other         Non- contributory    Breast Cancer Maternal Grandmother     Malignant Hyperthermia Neg Hx     Pseudocholinesterase Deficiency Neg Hx     Delayed Awakening Neg Hx     Post-op Nausea/Vomiting Neg Hx     Emergence Delirium Neg Hx     Post-op Cognitive Dysfunction Neg Hx       Current Outpatient Medications   Medication Sig Dispense Refill    Nurtec ODT 75 mg disintegrating tablet       Aimovig Autoinjector 140 mg/mL injection       multivitamin (ONE A DAY) tablet Take 1 Tablet by mouth daily.  ergocalciferol, vitamin d2, 10 mcg (400 unit) tab Take 1 Tablet by mouth daily.  cyanocobalamin (VITAMIN B12) 1,000 mcg/mL injection 1 mL by SubCUTAneous route every thirty (30) days. 30 mL 0    Syringe with Needle, Disp, 3 mL 25 x 5/8\" syrg 1 mL by SubCUTAneous route every thirty (30) days. 15 Syringe 0    carvediloL (Coreg) 25 mg tablet Take 25 mg by mouth two (2) times daily (with meals).  carisoprodoL (Soma) 350 mg tablet Take 1 Tab by mouth every eight (8) hours as needed for Muscle Spasm(s). Max Daily Amount: 1,050 mg. 20 Tab 1    traZODone (DESYREL) 100 mg tablet Take 200 mg by mouth.  topiramate (TOPAMAX) 100 mg tablet Take  by mouth two (2) times a day.  buPROPion XL (WELLBUTRIN XL) 150 mg tablet Take 150 mg by mouth every morning.  hydrOXYzine HCl (ATARAX) 25 mg tablet Take  by mouth three (3) times daily as needed for Itching.  naproxen (NAPROSYN) 500 mg tablet Take 500 mg by mouth two (2) times daily (with meals).  vilazodone (VIIBRYD) 40 mg tab tablet Take  by mouth daily.        Allergies   Allergen Reactions    Percocet [Oxycodone-Acetaminophen] Itching          Review of Systems:            General - No history or complaints of unexpected fever, chills, or weight loss  Head/Neck - No history or complaints of headache, diplopia, dysphagia, hearing loss  Cardiac - No history or complaints of chest pain, palpitations, murmur, or shortness of breath  Pulmonary - No history or complaints of shortness of breath, productive cough, hemoptysis  Gastrointestinal - No history or complaints of reflux,  abdominal pain, obstipation/constipation, blood per rectum  Genitourinary - No history or complaints of hematuria/dysuria, stress urinary incontinence symptoms, or renal lithiasis  Musculoskeletal - No history or complaints of joint pain or muscular weakness  Hematologic - No history or complaints of bleeding disorders, blood transfusions, sickle cell anemia  Neurologic - No history or complaints of  migraine headaches, seizure activity, syncopal episodes, TIA or stroke  Integumentary - No history or complaints of rashes, abnormal nevi, skin cancer  Gynecological - No history of heavy menses/abnormal menses           Objective:     Visit Vitals  BP (!) 145/76   Pulse 94   Ht 5' 4\" (1.626 m)   Wt 96 kg (211 lb 11.2 oz)   SpO2 98%   BMI 36.34 kg/m²       Physical Examination:     General appearance:  alert, cooperative, no distress, appears stated age   Mental status   alert, oriented to person, place, and time   Neck  supple, no significant adenopathy     Lymphatics  no palpable lymphadenopathy, no hepatosplenomegaly   Chest  clear to auscultation, no wheezes, rales or rhonchi, symmetric air entry   Heart  normal rate, regular rhythm, normal S1, S2, no murmurs, rubs, clicks or gallops    Abdomen: soft, nontender, nondistended, no masses or organomegaly   Incision: Well healed, no hernias      Neurological  alert, oriented, normal speech, no focal findings or movement disorder noted   Musculoskeletal no joint tenderness, deformity or swelling   Extremities peripheral pulses normal, no pedal edema, no clubbing or cyanosis   Skin normal coloration and turgor, no rashes, no suspicious skin lesions noted       Labs / Old Records:              Old operative reports reviewed if available and are scanned under the media tab or reviewed under Care Everywhere        Assessment:     Morbid obesity status post laparoscopic sleeve gastrectomy procedure approximately 8 years ago with complaint of weight regain. Plan: 1. Weight regain-Today in our office I had a lengthy discussion with Yann Briggs regarding the nature of their prior procedure. We discussed the anatomical changes to their anatomy and how this relates to  contributing weight regain. Our office will continue to attempt to obtain any medical records, if not obtained or available already ,related to their procedure. It was also discussed today that before any decisions can be made regarding a possible revision of their initial  procedure that an upper GI swallow study must be obtained to evaluate their post surgical anatomy. They understand that in patients with a prior open gastric bypass, those patients are not revision candidates due to adhesive disease usually, and the fact that post surgical anatomy  usually can not be improved upon. They understand if their gastric bypass was performed laparoscopically, then this situation might be more amendable to gastric band placement over their prior gastric bypass. They understand, as I have explained today, that the adhesive disease associated with prior  procedures is at times a rate limiting factor. This precludes our ability to perform a revision procedure safely. The factors that contribute to this are increased risk such as age, health issues and increased risk from a procedural standpoint and have been discussed today. We will proceed with the UGI swallow study as described above. The patient understands all of the above and wishes to proceed with the study. 2.Nutrition-  I have discussed in detail the pitfalls in diet that have contributed to their weight regain and the importance of adhering to a lifelong regimen of dietary goals and proper eating habits. I have discussed the proper lifelong bariatric diet  in detail spending in excess of 20 minutes discussing this.  We will schedule them for a dietary consultation with our nutritionist and urge them to continue on a regular follow-up schedule with her. 3.Maintenance vitamins- Today we have discussed the importance of vitamins as it pertains to their procedure and we will obtain appropriate lab to check all levels. They have been provided a handout regarding this today. Total time spent with the patient reviewing their complex history of bariatric surgery,diet, and plan is in excess of 60 minutes. Secondary Diagnoses:     Hypertension - The patient has a clear understanding of how weight loss improves hypertension as a whole, but also they understand that there is a significant genetic component to this disease process. We will monitor the patients blood pressure while in the hospital and the plan would be to continue those medications postoperatively.  If a diuretic is being used we will stop them on discharge to prevent dehydration particularly with the sleeve gastrectomy and the gastric bypass procedures.  They will be instructed to monitor their blood pressure postoperatively while at home and notify their primary care physician in the event of any significantly high or uncharacteristic readings.     Signed By: Krysta Lees NP     August 16, 2021

## 2021-08-21 LAB
25(OH)D3+25(OH)D2 SERPL-MCNC: 23.9 NG/ML (ref 30–100)
ALBUMIN SERPL-MCNC: 4.2 G/DL (ref 3.8–4.8)
ALBUMIN/GLOB SERPL: 1.3 {RATIO} (ref 1.2–2.2)
ALP SERPL-CCNC: 84 IU/L (ref 48–121)
ALT SERPL-CCNC: 14 IU/L (ref 0–32)
AST SERPL-CCNC: 16 IU/L (ref 0–40)
BASOPHILS # BLD AUTO: 0.1 X10E3/UL (ref 0–0.2)
BASOPHILS NFR BLD AUTO: 1 %
BILIRUB SERPL-MCNC: 0.4 MG/DL (ref 0–1.2)
BUN SERPL-MCNC: 12 MG/DL (ref 8–27)
BUN/CREAT SERPL: 25 (ref 12–28)
CALCIUM SERPL-MCNC: 9 MG/DL (ref 8.7–10.3)
CHLORIDE SERPL-SCNC: 101 MMOL/L (ref 96–106)
CO2 SERPL-SCNC: 24 MMOL/L (ref 20–29)
CREAT SERPL-MCNC: 0.48 MG/DL (ref 0.57–1)
EOSINOPHIL # BLD AUTO: 0.2 X10E3/UL (ref 0–0.4)
EOSINOPHIL NFR BLD AUTO: 3 %
ERYTHROCYTE [DISTWIDTH] IN BLOOD BY AUTOMATED COUNT: 14 % (ref 11.7–15.4)
FERRITIN SERPL-MCNC: 46 NG/ML (ref 15–150)
FOLATE SERPL-MCNC: 9.1 NG/ML
GLOBULIN SER CALC-MCNC: 3.3 G/DL (ref 1.5–4.5)
GLUCOSE SERPL-MCNC: 102 MG/DL (ref 65–99)
HCT VFR BLD AUTO: 39.9 % (ref 34–46.6)
HGB BLD-MCNC: 12.5 G/DL (ref 11.1–15.9)
IMM GRANULOCYTES # BLD AUTO: 0 X10E3/UL (ref 0–0.1)
IMM GRANULOCYTES NFR BLD AUTO: 0 %
IRON SERPL-MCNC: 119 UG/DL (ref 27–139)
LYMPHOCYTES # BLD AUTO: 2.3 X10E3/UL (ref 0.7–3.1)
LYMPHOCYTES NFR BLD AUTO: 44 %
MCH RBC QN AUTO: 27.1 PG (ref 26.6–33)
MCHC RBC AUTO-ENTMCNC: 31.3 G/DL (ref 31.5–35.7)
MCV RBC AUTO: 87 FL (ref 79–97)
MONOCYTES # BLD AUTO: 0.7 X10E3/UL (ref 0.1–0.9)
MONOCYTES NFR BLD AUTO: 13 %
NEUTROPHILS # BLD AUTO: 2 X10E3/UL (ref 1.4–7)
NEUTROPHILS NFR BLD AUTO: 39 %
PLATELET # BLD AUTO: 275 X10E3/UL (ref 150–450)
POTASSIUM SERPL-SCNC: 4 MMOL/L (ref 3.5–5.2)
PROT SERPL-MCNC: 7.5 G/DL (ref 6–8.5)
RBC # BLD AUTO: 4.61 X10E6/UL (ref 3.77–5.28)
SODIUM SERPL-SCNC: 138 MMOL/L (ref 134–144)
VIT B1 BLD-SCNC: 89.3 NMOL/L (ref 66.5–200)
VIT B12 SERPL-MCNC: 391 PG/ML (ref 232–1245)
WBC # BLD AUTO: 5.2 X10E3/UL (ref 3.4–10.8)

## 2021-08-23 RX ORDER — ERGOCALCIFEROL 1.25 MG/1
50000 CAPSULE ORAL
Qty: 26 CAPSULE | Refills: 1 | Status: SHIPPED | OUTPATIENT
Start: 2021-08-23 | End: 2022-03-02

## 2021-08-23 NOTE — PROGRESS NOTES
Please call patient and let her know vitamin D is low and I sent weekly prescription to pharmacy on file. Make sure she has started monthly B12.

## 2021-09-22 ENCOUNTER — TRANSCRIBE ORDER (OUTPATIENT)
Dept: SCHEDULING | Age: 61
End: 2021-09-22

## 2021-09-22 DIAGNOSIS — M25.561 RIGHT KNEE PAIN: Primary | ICD-10-CM

## 2021-09-29 ENCOUNTER — APPOINTMENT (OUTPATIENT)
Dept: GENERAL RADIOLOGY | Age: 61
End: 2021-09-29
Attending: SPECIALIST
Payer: MEDICARE

## 2021-09-29 ENCOUNTER — HOSPITAL ENCOUNTER (OUTPATIENT)
Age: 61
Setting detail: OUTPATIENT SURGERY
Discharge: HOME | End: 2021-09-29
Attending: SPECIALIST | Admitting: SPECIALIST
Payer: MEDICARE

## 2021-09-29 ENCOUNTER — APPOINTMENT (OUTPATIENT)
Dept: SURGERY | Age: 61
End: 2021-09-29

## 2021-09-29 VITALS
DIASTOLIC BLOOD PRESSURE: 87 MMHG | HEART RATE: 109 BPM | WEIGHT: 211 LBS | OXYGEN SATURATION: 100 % | BODY MASS INDEX: 36.02 KG/M2 | HEIGHT: 64 IN | RESPIRATION RATE: 18 BRPM | TEMPERATURE: 98.1 F | SYSTOLIC BLOOD PRESSURE: 123 MMHG

## 2021-09-29 DIAGNOSIS — E66.01 MORBID OBESITY (HCC): ICD-10-CM

## 2021-09-29 PROCEDURE — 74240 X-RAY XM UPR GI TRC 1CNTRST: CPT | Performed by: SPECIALIST

## 2021-09-29 PROCEDURE — 74011000250 HC RX REV CODE- 250: Performed by: SPECIALIST

## 2021-09-29 PROCEDURE — 76040000019: Performed by: SPECIALIST

## 2021-09-29 PROCEDURE — 74240 X-RAY XM UPR GI TRC 1CNTRST: CPT

## 2021-09-29 NOTE — PROCEDURES
[facility]    Upper GI Procedure Report      Alex Anderson  MR# [id number]  1960  Date of Service - September 29, 2021    Pre-Op Diagnosis - patient is status post sleeve resection performed by myself 8 years ago with complaint of weight regain. They now present for UGI to assess their post surgical anatomy. Post-Op Diagnosis -same    Procedure - UGI study with barium    Surgeon - Yahir Cade MD    Assistant - None    Complications - None    Specimens - None    Implants - None    Estimate Blood Loss - None    Statement of Medical Necessity - Need for radiologic evaluation prior to further management of their care. .    Procedure -the patient was brought to the fluoroscopy suite where they were given thin barium. On swallowing the barium the patient was noted to have normal peristalsis of their esophagus with progressive flow into the distal esophagus. Specific findings of the distal esophagus revealed that they did not have a hiatal hernia. Contrast then flowed into the gastric cardia with the following findings: Contrast flowed normally through the esophagus and into a normal caliber sleeve without obstruction or reflux or kinking of any kind. Contrast continued to flow through the sleeve and into the small intestine without issue. The plan going forward will be to pursue a sleeve to bypass conversion in an effort to continue weight loss.       Yahir Cade MD

## 2021-10-13 ENCOUNTER — HOSPITAL ENCOUNTER (OUTPATIENT)
Dept: CT IMAGING | Age: 61
Discharge: HOME | End: 2021-10-13
Attending: ORTHOPAEDIC SURGERY
Payer: MEDICARE

## 2021-10-13 DIAGNOSIS — M25.561 RIGHT KNEE PAIN: ICD-10-CM

## 2021-10-13 PROCEDURE — 73700 CT LOWER EXTREMITY W/O DYE: CPT

## 2021-11-03 ENCOUNTER — HOSPITAL ENCOUNTER (OUTPATIENT)
Age: 61
Setting detail: OUTPATIENT SURGERY
Discharge: HOME | End: 2021-11-03
Attending: SPECIALIST | Admitting: SPECIALIST

## 2021-11-03 ENCOUNTER — APPOINTMENT (OUTPATIENT)
Dept: GENERAL RADIOLOGY | Age: 61
End: 2021-11-03
Attending: SPECIALIST

## 2021-11-03 ENCOUNTER — APPOINTMENT (OUTPATIENT)
Dept: SURGERY | Age: 61
End: 2021-11-03

## 2021-11-03 VITALS
HEART RATE: 89 BPM | HEIGHT: 64 IN | DIASTOLIC BLOOD PRESSURE: 87 MMHG | WEIGHT: 216.4 LBS | TEMPERATURE: 98.2 F | SYSTOLIC BLOOD PRESSURE: 139 MMHG | BODY MASS INDEX: 36.95 KG/M2 | RESPIRATION RATE: 18 BRPM | OXYGEN SATURATION: 100 %

## 2021-11-03 DIAGNOSIS — E66.01 MORBID OBESITY (HCC): ICD-10-CM

## 2021-11-08 ENCOUNTER — HOSPITAL ENCOUNTER (OUTPATIENT)
Dept: LAB | Age: 61
Discharge: HOME | End: 2021-11-08

## 2021-11-08 ENCOUNTER — HOSPITAL ENCOUNTER (OUTPATIENT)
Dept: GENERAL RADIOLOGY | Age: 61
Discharge: HOME | End: 2021-11-08
Payer: MEDICARE

## 2021-11-08 ENCOUNTER — TRANSCRIBE ORDER (OUTPATIENT)
Dept: REGISTRATION | Age: 61
End: 2021-11-08

## 2021-11-08 ENCOUNTER — HOSPITAL ENCOUNTER (OUTPATIENT)
Dept: PREADMISSION TESTING | Age: 61
Discharge: HOME | End: 2021-11-08
Payer: MEDICARE

## 2021-11-08 DIAGNOSIS — M25.561 RIGHT KNEE PAIN: Primary | ICD-10-CM

## 2021-11-08 DIAGNOSIS — M25.561 RIGHT KNEE PAIN: ICD-10-CM

## 2021-11-08 LAB
ATRIAL RATE: 76 BPM
CALCULATED P AXIS, ECG09: 68 DEGREES
CALCULATED R AXIS, ECG10: 27 DEGREES
CALCULATED T AXIS, ECG11: 50 DEGREES
DIAGNOSIS, 93000: NORMAL
P-R INTERVAL, ECG05: 160 MS
Q-T INTERVAL, ECG07: 412 MS
QRS DURATION, ECG06: 88 MS
QTC CALCULATION (BEZET), ECG08: 463 MS
VENTRICULAR RATE, ECG03: 76 BPM
XX-LABCORP SPECIMEN COL,LCBCF: NORMAL

## 2021-11-08 PROCEDURE — 99001 SPECIMEN HANDLING PT-LAB: CPT

## 2021-11-08 PROCEDURE — 71046 X-RAY EXAM CHEST 2 VIEWS: CPT

## 2021-11-08 PROCEDURE — 93005 ELECTROCARDIOGRAM TRACING: CPT

## 2021-11-09 ENCOUNTER — HOSPITAL ENCOUNTER (OUTPATIENT)
Dept: PREADMISSION TESTING | Age: 61
Discharge: HOME | End: 2021-11-09

## 2021-11-09 NOTE — CALL BACK NOTE
2nd phone call attempted. No answer. Mailbox full. Called emergency contact, her son. He was going to go to her house and give her the message that she would need to call and reschedule her appt.

## 2021-11-10 ENCOUNTER — HOSPITAL ENCOUNTER (OUTPATIENT)
Dept: PREADMISSION TESTING | Age: 61
Discharge: HOME | End: 2021-11-10

## 2021-11-10 VITALS — BODY MASS INDEX: 36.88 KG/M2 | WEIGHT: 216 LBS | HEIGHT: 64 IN

## 2021-11-10 RX ORDER — CEFAZOLIN SODIUM/WATER 2 G/20 ML
2 SYRINGE (ML) INTRAVENOUS ONCE
Status: CANCELLED | OUTPATIENT
Start: 2021-11-10 | End: 2021-11-10

## 2021-11-10 RX ORDER — SODIUM CHLORIDE, SODIUM LACTATE, POTASSIUM CHLORIDE, CALCIUM CHLORIDE 600; 310; 30; 20 MG/100ML; MG/100ML; MG/100ML; MG/100ML
125 INJECTION, SOLUTION INTRAVENOUS CONTINUOUS
Status: CANCELLED | OUTPATIENT
Start: 2021-11-10

## 2021-11-10 RX ORDER — TRANEXAMIC ACID 10 MG/ML
1 INJECTION, SOLUTION INTRAVENOUS ONCE
Status: CANCELLED | OUTPATIENT
Start: 2021-11-10 | End: 2021-11-10

## 2021-11-10 RX ORDER — POTASSIUM CHLORIDE 1.5 G/1.77G
20 POWDER, FOR SOLUTION ORAL 2 TIMES DAILY WITH MEALS
COMMUNITY
End: 2021-11-23

## 2021-11-10 NOTE — PERIOP NOTES
PAT phone interview completed. Patient made aware to be NPO and to quarantine. Patient stated she will come 11/15 for COVID test. Patient stated she has a ride for discharge. Patient made aware not to wear jewelry, makeup, nail polish, lotion, oil or spray on DOS. Reviewed surgical skin preparation with patient. Patient stated understanding. Opportunity for questions given. Patient stated she has a DNR order. Patient made aware to bring DNR paperwork on DOS.

## 2021-11-15 ENCOUNTER — HOSPITAL ENCOUNTER (OUTPATIENT)
Dept: PREADMISSION TESTING | Age: 61
Discharge: HOME | End: 2021-11-15
Payer: MEDICARE

## 2021-11-15 PROCEDURE — U0003 INFECTIOUS AGENT DETECTION BY NUCLEIC ACID (DNA OR RNA); SEVERE ACUTE RESPIRATORY SYNDROME CORONAVIRUS 2 (SARS-COV-2) (CORONAVIRUS DISEASE [COVID-19]), AMPLIFIED PROBE TECHNIQUE, MAKING USE OF HIGH THROUGHPUT TECHNOLOGIES AS DESCRIBED BY CMS-2020-01-R: HCPCS

## 2021-11-16 LAB — SARS-COV-2, NAA: NOT DETECTED

## 2021-11-16 NOTE — DISCHARGE INSTRUCTIONS
Dr. Jazmin Ye Operative Instructions Revision Total Knee Replacement    ACTIVITIES :  1. You may be up and walking about the house with your walker. 2.  Activities around the house, such as washing dishes, fixing light meals, and your own personal care are fine. 3.  Avoid strenuous activities, such as vacuuming, lifting laundry or grocery bags. 4.  Walking is the best way to rebuild strength and stamina. Start SLOWLY and gradually increase your distance. 5.  Avoid any jogging, running or excessive stair-climbing   6. Your home physical therapist will work with you and your range-of-motion for the first 7-14 days. After your first visit with Dr. Joey Avalos you will be scheduled for out-patient physical therapy at a site convenient for you. 7.  Follow-up with Dr. Joey Avalos in 10-14 days. BATHING and INCISION CARE:  1. Do not remove bandage until first post-op visit in office  2. The incision may be tender to touch or feel numb: this is normal.   3.  Keep the incision clean and dry no showering until your follow-up appointment. The incision will be closed with sutures under the skin and the skin will be glued. 4.  Do not apply any lotions, ointments or oils on the incision. 5.  If you notice any excessive swelling, redness, or persistent drainage around the incision, notify the office immediately. DRIVIN. You should not drive until after your follow-up appointment. 2.  You can be in a vehicle for short distances, but if you travel any long distance, please stop about every 30 minutes and walk/stretch. 3.  You should NEVER drive while taking narcotic medication. 4.  Driving will be permitted on right knee replacements after the therapist has confirmed a range-of-motion of a 105 degrees. Left knee replacements may drive at 2 weeks post-op. RETURN TO WORK :  1. The decision to return to work will be determined on an individual basis.    2.  Many people who have a strenuous job (construction, heavy labor, etc) may need to be off work for up to 12 weeks. 3.  If you need a work note, please let us know as soon as possible, and not the same day you are planning to return to work. NUTRITION :  1.  Good nutrition is an essential part of healing. 2.  You should eat a balanced diet each day, including fruits, vegetables, dairy products and protein. 3.  Remember to drink plenty of water. 4.  If you have not had a bowel movement within 3 days of surgery, you will need to use a laxative or suppository that can be obtained over-the-counter at your local pharmacy. MEDICATIONS -  1. You may resume the medications you were taking before surgery. 2.  You will receive a prescription for pain medication at discharge from the hospital. The pain medication works best if taken before the pain becomes severe. 3.  To reduce stomach upset, always take the medication with food. 4.  Begin to wean yourself off the pain medication during the second week after discharge. 5.  If you need a refill, please call the office during working hours at least 2 days before your prescription runs out. Do not wait until your bottle is empty to call for a refill. 6.  You will also be prescribed a blood thinner that you will take by injection for 21 days post-operatively. 7.  DO NOT drive if you are taking narcotic pain medications. HOME HEALTH CARE:  1.   A home health care service has been set-up for you to help assist you once you leave the hospital.  2.  They will contact you either before you leave the hospital or within 24 hours once you have been discharged home. 3. A nurse will assist you with your dressing changes and a Physical Therapist with help you with your therapy needs.     CALL THE OFFICE:   If you have severe pain unrelieved by the medications;   If you have a fever of 101.0°F or greater;    If you notice excessive swelling, redness, or persistent drainage from the incision or IV site; [facility] office number is [phone] from 8:00am to 5:00pm Monday through Friday. After 5:00pm, on weekends, or holidays, please leave a message with our answering service and the doctor on-call will get back to you shortly.

## 2021-11-17 NOTE — H&P
Patient Name:  Joseph Mcguire   YOB: 1960      Chief Complaint:  Right knee symptoms. History of Chief Complaint:  Ms. Anabelle Houser comes in for evaluation and followup of the CT scan. She was previously evaluated and revised knee was seemingly well fixed. Evaluation of bone scan noted only mild uptake about the components but without obvious loosening otherwise. She continues to complain of global symptoms of pain. She also had evaluation by Dr. Gaye Henley upstairs with plans to continue followup. Past Medical/Surgical History:    Disease/Disorder Date Side Surgery Date Side Comment   Anxiety         Arthritis         Bipolar disorder         Depression         Femur fracture         Hyperlipidemia         Hypertension         Myocardial infarction         Nerve disorder         PTSD - Post-traumatic stress disorder         Sleep apnea            Arthroscopy knee 2011 right       Bilateral tubal ligation 1995         section 1995        Cholecystectomy 2006        Knee replacement 2011 bilateral       Knee revision 2012 left       Knee revision 2017 right      Allergies:    Ingredient Reaction Medication Name Comment   ACETAMINOPHEN  Tylenol        Current Medications:    Medication Directions   Aimovig Autoinjector 70 mg/mL subcutaneous auto-injector inject (70MG)  by subcutaneous route  every month in the abdomen, thigh, or outer area of upper arm   bupropion HCl    Nurtec ODT 75 mg disintegrating tablet place 1 tablet by translingual route on top of tongue, allow to dissolve then swallow once as needed for migraine; max 1 dose/24 hrs   trazodone HCl    Viibryd 40 mg tablet take 1 tablet by oral route  every day with food   Vitamin D2      Social History:  She has a high school diploma. She is . She does not use recreational drugs. She exercises occasionally. She does not work. She has been treated for anxiety and depression.  She does have a history of suicide attempt. No history of drug or alcohol abuse. SMOKING  Status Tobacco Type Units Per Day Yrs Used   Current every day smoker        ALCOHOL  There is a history of alcohol use. consumed socially. Family History:    Disease Detail Family Member Age Cause of Death Comments   Alcoholism Mother  N    Arthritis Mother  N    Arthritis Father  N    Cancer, unknown Mother  N    Family history of Alzheimer's disease   N    Family history of Dementia   N      Review of Systems:    GENERAL:  Patient has no signs of fever or chills. or weight change  HEAD/ENTM:  Patient has no signs of headaches, dizziness, hearing loss, ringing in ears, sore throat/hoarseness, recent cold, double vision, blurred vision, itchy eyes, eye redness or eye discharge. CARDIOVASCULAR:  Patient has no signs of chest pain, palpitations, rheumatic fever or heart murmur. RESPIRATORY:  Patient has no signs of chronic cough, wheezing, difficulty breathing, pain on breathing or shortness of breath. GASTROINTESTINAL:  Patient has no signs of nausea/vomiting, difficulty swallowing, gas/bloating, indigestion, abdominal pain, diarrhea, bloody stools or hemorrhoids. GENITOURINARY:  Patient has no signs of blood in urine, painful urinating, burning sensation, bladder/kidney infection, frequent urinating or incontinence. MUSCULOSKELETAL: Patient presents with joint stiffness and joint pain. Patient has no signs of fracture/dislocation, sprain/strain, tendonitis, rheumatoid disease, gout or swelling of feet. INTEGUMENTARY:  Patient has no signs of rash/itching, psoriasis, Raynaud's phenomenon or varicose veins. EMOTIONAL:  Patient has no signs of anxiety, depression, bipolar disorder, memory loss or change in mood.     Vitals:  Date BP Pulse Temp (F) Resp. (per min.) Height (Total in.) Weight (lbs.) BMI   10/18/2021     64.00 200.00 34.33     Physical Examination:    Psychiatric/General:  No acute distress; pleasant and accommodating. HEENT:  Moist mucous membranes intact. Lymphatic:  Neck is supple with no lymphadenopathy upon evaluation. Respiratory:   Equal bilateral chest wall movement with inspiration; no wheezes or stridor audible. Cardiovascular:    Regular rate and rhythm, as noted by upper extremity pulses. Musculoskeletal: On evaluation of the right knee, she is able to flex and extend with good overall motion without gross instability. She has diffuse pain globally and in the anterior portion, which is difficult to categorize. CT shows seemingly well fixed components with what I anticipate is a DePuy prosthesis. No obvious signs of loosening. Data/Radiograph Evaluation:    Review of previous x-rays show eccentrically cut patella on the sunrise view with likely some lateral subluxation and this may be what she describes as shifting or popping loose. I do not see any other mechanical factors that correlate. Assessment: Lower extremity complaints as noted above. Recommendation:  We reviewed findings and recommendations for moving forward with consideration of poly exchange and patella revision. The risks and benefits were reviewed and include infection, bleeding, deep venous thrombosis, pulmonary embolism, heart attack, stroke, death, arthrofibrosis, need for manipulation, neurovascular compromise, need for revision surgical intervention, persistent long-term pain, need for chronic pain management, and metallic allergies. We will continue with plans for scheduling. The patient was counseled at length about the risks of wiley Covid-19 during their perioperative period and any recovery window from their procedure. The patient was made aware that wiley Covid-19  may worsen their prognosis for recovering from their procedure and lend to a higher morbidity and/or mortality risk. All material risks, benefits, and reasonable alternatives including postponing the procedure were discussed. The patient  wish to proceed with the procedure at this time.     Nhung York, DO

## 2021-11-18 ENCOUNTER — ANESTHESIA (OUTPATIENT)
Dept: SURGERY | Age: 61
End: 2021-11-18
Payer: MEDICARE

## 2021-11-18 ENCOUNTER — HOSPITAL ENCOUNTER (OUTPATIENT)
Age: 61
Discharge: HOME HEALTH | End: 2021-11-19
Attending: ORTHOPAEDIC SURGERY | Admitting: ORTHOPAEDIC SURGERY
Payer: MEDICARE

## 2021-11-18 ENCOUNTER — ANESTHESIA EVENT (OUTPATIENT)
Dept: SURGERY | Age: 61
End: 2021-11-18
Payer: MEDICARE

## 2021-11-18 ENCOUNTER — APPOINTMENT (OUTPATIENT)
Dept: GENERAL RADIOLOGY | Age: 61
End: 2021-11-18
Attending: PHYSICIAN ASSISTANT
Payer: MEDICARE

## 2021-11-18 DIAGNOSIS — Z96.651 S/P REVISION OF TOTAL KNEE, RIGHT: Primary | ICD-10-CM

## 2021-11-18 PROBLEM — Z96.659 COMPLICATION OF INTERNAL KNEE PROSTHESIS (HCC): Status: ACTIVE | Noted: 2021-11-18

## 2021-11-18 PROBLEM — T84.9XXA COMPLICATION OF INTERNAL KNEE PROSTHESIS (HCC): Status: ACTIVE | Noted: 2021-11-18

## 2021-11-18 LAB
ABO + RH BLD: NORMAL
BLOOD GROUP ANTIBODIES SERPL: NORMAL
SPECIMEN EXP DATE BLD: NORMAL

## 2021-11-18 PROCEDURE — 36415 COLL VENOUS BLD VENIPUNCTURE: CPT

## 2021-11-18 PROCEDURE — C1713 ANCHOR/SCREW BN/BN,TIS/BN: HCPCS | Performed by: ORTHOPAEDIC SURGERY

## 2021-11-18 PROCEDURE — 74011250636 HC RX REV CODE- 250/636: Performed by: NURSE ANESTHETIST, CERTIFIED REGISTERED

## 2021-11-18 PROCEDURE — C1776 JOINT DEVICE (IMPLANTABLE): HCPCS | Performed by: ORTHOPAEDIC SURGERY

## 2021-11-18 PROCEDURE — 74011250637 HC RX REV CODE- 250/637: Performed by: PHYSICIAN ASSISTANT

## 2021-11-18 PROCEDURE — 77030020782 HC GWN BAIR PAWS FLX 3M -B: Performed by: ORTHOPAEDIC SURGERY

## 2021-11-18 PROCEDURE — 77030034479 HC ADH SKN CLSR PRINEO J&J -B: Performed by: ORTHOPAEDIC SURGERY

## 2021-11-18 PROCEDURE — 77030034694 HC SCPL CANADY PLSM DISP USMD -E: Performed by: ORTHOPAEDIC SURGERY

## 2021-11-18 PROCEDURE — 76942 ECHO GUIDE FOR BIOPSY: CPT | Performed by: ORTHOPAEDIC SURGERY

## 2021-11-18 PROCEDURE — 74011000250 HC RX REV CODE- 250: Performed by: STUDENT IN AN ORGANIZED HEALTH CARE EDUCATION/TRAINING PROGRAM

## 2021-11-18 PROCEDURE — 86901 BLOOD TYPING SEROLOGIC RH(D): CPT

## 2021-11-18 PROCEDURE — 74011000250 HC RX REV CODE- 250: Performed by: ORTHOPAEDIC SURGERY

## 2021-11-18 PROCEDURE — 74011250636 HC RX REV CODE- 250/636: Performed by: ORTHOPAEDIC SURGERY

## 2021-11-18 PROCEDURE — 73560 X-RAY EXAM OF KNEE 1 OR 2: CPT

## 2021-11-18 PROCEDURE — C9290 INJ, BUPIVACAINE LIPOSOME: HCPCS | Performed by: ORTHOPAEDIC SURGERY

## 2021-11-18 PROCEDURE — 77030012508 HC MSK AIRWY LMA AMBU -A: Performed by: STUDENT IN AN ORGANIZED HEALTH CARE EDUCATION/TRAINING PROGRAM

## 2021-11-18 PROCEDURE — 77030018835 HC SOL IRR LR ICUM -A: Performed by: ORTHOPAEDIC SURGERY

## 2021-11-18 PROCEDURE — 76060000036 HC ANESTHESIA 2.5 TO 3 HR: Performed by: ORTHOPAEDIC SURGERY

## 2021-11-18 PROCEDURE — 76210000006 HC OR PH I REC 0.5 TO 1 HR: Performed by: ORTHOPAEDIC SURGERY

## 2021-11-18 PROCEDURE — 74011000258 HC RX REV CODE- 258: Performed by: ORTHOPAEDIC SURGERY

## 2021-11-18 PROCEDURE — 74011250637 HC RX REV CODE- 250/637: Performed by: STUDENT IN AN ORGANIZED HEALTH CARE EDUCATION/TRAINING PROGRAM

## 2021-11-18 PROCEDURE — L1830 KO IMMOB CANVAS LONG PRE OTS: HCPCS | Performed by: ORTHOPAEDIC SURGERY

## 2021-11-18 PROCEDURE — 74011250636 HC RX REV CODE- 250/636: Performed by: STUDENT IN AN ORGANIZED HEALTH CARE EDUCATION/TRAINING PROGRAM

## 2021-11-18 PROCEDURE — 77030013708 HC HNDPC SUC IRR PULS STRY –B: Performed by: ORTHOPAEDIC SURGERY

## 2021-11-18 PROCEDURE — 64447 NJX AA&/STRD FEMORAL NRV IMG: CPT | Performed by: ORTHOPAEDIC SURGERY

## 2021-11-18 PROCEDURE — 2709999900 HC NON-CHARGEABLE SUPPLY: Performed by: ORTHOPAEDIC SURGERY

## 2021-11-18 PROCEDURE — 74011250636 HC RX REV CODE- 250/636: Performed by: PHYSICIAN ASSISTANT

## 2021-11-18 PROCEDURE — 74011000250 HC RX REV CODE- 250: Performed by: PHYSICIAN ASSISTANT

## 2021-11-18 PROCEDURE — 76010000132 HC OR TIME 2.5 TO 3 HR: Performed by: ORTHOPAEDIC SURGERY

## 2021-11-18 PROCEDURE — 77030003666 HC NDL SPINAL BD -A: Performed by: ORTHOPAEDIC SURGERY

## 2021-11-18 PROCEDURE — 77030040361 HC SLV COMPR DVT MDII -B: Performed by: ORTHOPAEDIC SURGERY

## 2021-11-18 PROCEDURE — 77030002966 HC SUT PDS J&J -A: Performed by: ORTHOPAEDIC SURGERY

## 2021-11-18 PROCEDURE — 77030027138 HC INCENT SPIROMETER -A: Performed by: ORTHOPAEDIC SURGERY

## 2021-11-18 PROCEDURE — 97116 GAIT TRAINING THERAPY: CPT

## 2021-11-18 PROCEDURE — 77010033678 HC OXYGEN DAILY

## 2021-11-18 PROCEDURE — 97161 PT EVAL LOW COMPLEX 20 MIN: CPT

## 2021-11-18 PROCEDURE — 74011000250 HC RX REV CODE- 250: Performed by: NURSE ANESTHETIST, CERTIFIED REGISTERED

## 2021-11-18 DEVICE — IMPLANTABLE DEVICE: Type: IMPLANTABLE DEVICE | Site: KNEE | Status: FUNCTIONAL

## 2021-11-18 DEVICE — CEMENT BNE 20GM HALF DOSE PMMA W/ GENT HI VISC RADPQ FAST: Type: IMPLANTABLE DEVICE | Site: KNEE | Status: FUNCTIONAL

## 2021-11-18 DEVICE — ADAPTER FEM 5DEG KNEE PFC SIG: Type: IMPLANTABLE DEVICE | Site: KNEE | Status: FUNCTIONAL

## 2021-11-18 DEVICE — AUGMENT FEM SZ 3 THK4MM STD POST TI CEM PRI PFC SIG: Type: IMPLANTABLE DEVICE | Site: KNEE | Status: FUNCTIONAL

## 2021-11-18 DEVICE — STEM FEM L115MM DIA14MM UNIV KNEE FLUT PRESSFIT FOR ROT HNG: Type: IMPLANTABLE DEVICE | Site: KNEE | Status: FUNCTIONAL

## 2021-11-18 DEVICE — ADAPTER FEM NEUT KNEE BOLT PFC SIG: Type: IMPLANTABLE DEVICE | Site: KNEE | Status: FUNCTIONAL

## 2021-11-18 DEVICE — COMPONENT PAT DIA35MM DST KNEE POLY OVL DOME 3 PEG NP CEM: Type: IMPLANTABLE DEVICE | Site: KNEE | Status: FUNCTIONAL

## 2021-11-18 DEVICE — SLEEVE FEM L34MM UNIV MTPHSEAL FULL POR LPS: Type: IMPLANTABLE DEVICE | Site: KNEE | Status: FUNCTIONAL

## 2021-11-18 RX ORDER — PHENYLEPHRINE HYDROCHLORIDE 10 MG/ML
INJECTION INTRAVENOUS AS NEEDED
Status: DISCONTINUED | OUTPATIENT
Start: 2021-11-18 | End: 2021-11-18 | Stop reason: HOSPADM

## 2021-11-18 RX ORDER — HYDROMORPHONE HYDROCHLORIDE 2 MG/1
2 TABLET ORAL
Status: DISCONTINUED | OUTPATIENT
Start: 2021-11-18 | End: 2021-11-19

## 2021-11-18 RX ORDER — ROPIVACAINE HYDROCHLORIDE 5 MG/ML
INJECTION, SOLUTION EPIDURAL; INFILTRATION; PERINEURAL
Status: COMPLETED | OUTPATIENT
Start: 2021-11-18 | End: 2021-11-18

## 2021-11-18 RX ORDER — NALOXONE HYDROCHLORIDE 0.4 MG/ML
0.04 INJECTION, SOLUTION INTRAMUSCULAR; INTRAVENOUS; SUBCUTANEOUS
Status: DISCONTINUED | OUTPATIENT
Start: 2021-11-18 | End: 2021-11-18 | Stop reason: HOSPADM

## 2021-11-18 RX ORDER — LANOLIN ALCOHOL/MO/W.PET/CERES
1 CREAM (GRAM) TOPICAL 2 TIMES DAILY WITH MEALS
Status: DISCONTINUED | OUTPATIENT
Start: 2021-11-18 | End: 2021-11-19 | Stop reason: HOSPADM

## 2021-11-18 RX ORDER — OXYCODONE HYDROCHLORIDE 5 MG/1
5 TABLET ORAL
Status: CANCELLED | OUTPATIENT
Start: 2021-11-18

## 2021-11-18 RX ORDER — ACETAMINOPHEN 325 MG/1
650 TABLET ORAL
Status: DISCONTINUED | OUTPATIENT
Start: 2021-11-18 | End: 2021-11-19 | Stop reason: HOSPADM

## 2021-11-18 RX ORDER — MIDAZOLAM HYDROCHLORIDE 1 MG/ML
INJECTION, SOLUTION INTRAMUSCULAR; INTRAVENOUS
Status: COMPLETED | OUTPATIENT
Start: 2021-11-18 | End: 2021-11-18

## 2021-11-18 RX ORDER — SODIUM CHLORIDE, SODIUM LACTATE, POTASSIUM CHLORIDE, CALCIUM CHLORIDE 600; 310; 30; 20 MG/100ML; MG/100ML; MG/100ML; MG/100ML
50 INJECTION, SOLUTION INTRAVENOUS CONTINUOUS
Status: DISCONTINUED | OUTPATIENT
Start: 2021-11-18 | End: 2021-11-18 | Stop reason: HOSPADM

## 2021-11-18 RX ORDER — DIPHENHYDRAMINE HYDROCHLORIDE 50 MG/ML
12.5 INJECTION, SOLUTION INTRAMUSCULAR; INTRAVENOUS
Status: DISCONTINUED | OUTPATIENT
Start: 2021-11-18 | End: 2021-11-19 | Stop reason: HOSPADM

## 2021-11-18 RX ORDER — VILAZODONE HYDROCHLORIDE 20 MG/1
40 TABLET ORAL DAILY
Status: DISCONTINUED | OUTPATIENT
Start: 2021-11-19 | End: 2021-11-19 | Stop reason: HOSPADM

## 2021-11-18 RX ORDER — SODIUM CHLORIDE 0.9 % (FLUSH) 0.9 %
5-40 SYRINGE (ML) INJECTION EVERY 8 HOURS
Status: DISCONTINUED | OUTPATIENT
Start: 2021-11-18 | End: 2021-11-18 | Stop reason: HOSPADM

## 2021-11-18 RX ORDER — ONDANSETRON 2 MG/ML
4 INJECTION INTRAMUSCULAR; INTRAVENOUS
Status: DISCONTINUED | OUTPATIENT
Start: 2021-11-18 | End: 2021-11-19 | Stop reason: HOSPADM

## 2021-11-18 RX ORDER — HYDROXYZINE 25 MG/1
25 TABLET, FILM COATED ORAL
Status: DISCONTINUED | OUTPATIENT
Start: 2021-11-18 | End: 2021-11-19 | Stop reason: HOSPADM

## 2021-11-18 RX ORDER — PROPOFOL 10 MG/ML
INJECTION, EMULSION INTRAVENOUS AS NEEDED
Status: DISCONTINUED | OUTPATIENT
Start: 2021-11-18 | End: 2021-11-18 | Stop reason: HOSPADM

## 2021-11-18 RX ORDER — BUPROPION HYDROCHLORIDE 150 MG/1
150 TABLET ORAL
Status: DISCONTINUED | OUTPATIENT
Start: 2021-11-19 | End: 2021-11-19 | Stop reason: HOSPADM

## 2021-11-18 RX ORDER — TRANEXAMIC ACID 10 MG/ML
1 INJECTION, SOLUTION INTRAVENOUS ONCE
Status: COMPLETED | OUTPATIENT
Start: 2021-11-18 | End: 2021-11-18

## 2021-11-18 RX ORDER — CEFAZOLIN SODIUM/WATER 2 G/20 ML
2 SYRINGE (ML) INTRAVENOUS EVERY 8 HOURS
Status: COMPLETED | OUTPATIENT
Start: 2021-11-18 | End: 2021-11-19

## 2021-11-18 RX ORDER — SODIUM CHLORIDE 0.9 % (FLUSH) 0.9 %
5-40 SYRINGE (ML) INJECTION AS NEEDED
Status: DISCONTINUED | OUTPATIENT
Start: 2021-11-18 | End: 2021-11-19 | Stop reason: HOSPADM

## 2021-11-18 RX ORDER — HYDROMORPHONE HYDROCHLORIDE 1 MG/ML
INJECTION, SOLUTION INTRAMUSCULAR; INTRAVENOUS; SUBCUTANEOUS AS NEEDED
Status: DISCONTINUED | OUTPATIENT
Start: 2021-11-18 | End: 2021-11-18 | Stop reason: HOSPADM

## 2021-11-18 RX ORDER — DEXAMETHASONE SODIUM PHOSPHATE 10 MG/ML
INJECTION INTRAMUSCULAR; INTRAVENOUS
Status: COMPLETED | OUTPATIENT
Start: 2021-11-18 | End: 2021-11-18

## 2021-11-18 RX ORDER — ONDANSETRON 2 MG/ML
INJECTION INTRAMUSCULAR; INTRAVENOUS AS NEEDED
Status: DISCONTINUED | OUTPATIENT
Start: 2021-11-18 | End: 2021-11-18 | Stop reason: HOSPADM

## 2021-11-18 RX ORDER — FENTANYL CITRATE 50 UG/ML
INJECTION, SOLUTION INTRAMUSCULAR; INTRAVENOUS
Status: COMPLETED | OUTPATIENT
Start: 2021-11-18 | End: 2021-11-18

## 2021-11-18 RX ORDER — DEXMEDETOMIDINE HYDROCHLORIDE 100 UG/ML
INJECTION, SOLUTION INTRAVENOUS
Status: COMPLETED | OUTPATIENT
Start: 2021-11-18 | End: 2021-11-18

## 2021-11-18 RX ORDER — DOCUSATE SODIUM 100 MG/1
100 CAPSULE, LIQUID FILLED ORAL DAILY
Status: DISCONTINUED | OUTPATIENT
Start: 2021-11-19 | End: 2021-11-19 | Stop reason: HOSPADM

## 2021-11-18 RX ORDER — SODIUM CHLORIDE 0.9 % (FLUSH) 0.9 %
5-40 SYRINGE (ML) INJECTION AS NEEDED
Status: DISCONTINUED | OUTPATIENT
Start: 2021-11-18 | End: 2021-11-18 | Stop reason: HOSPADM

## 2021-11-18 RX ORDER — DIPHENHYDRAMINE HYDROCHLORIDE 50 MG/ML
12.5 INJECTION, SOLUTION INTRAMUSCULAR; INTRAVENOUS
Status: DISCONTINUED | OUTPATIENT
Start: 2021-11-18 | End: 2021-11-18 | Stop reason: HOSPADM

## 2021-11-18 RX ORDER — NALBUPHINE HYDROCHLORIDE 10 MG/ML
5 INJECTION, SOLUTION INTRAMUSCULAR; INTRAVENOUS; SUBCUTANEOUS
Status: DISCONTINUED | OUTPATIENT
Start: 2021-11-18 | End: 2021-11-18 | Stop reason: HOSPADM

## 2021-11-18 RX ORDER — FENTANYL CITRATE 50 UG/ML
INJECTION, SOLUTION INTRAMUSCULAR; INTRAVENOUS
Status: COMPLETED
Start: 2021-11-18 | End: 2021-11-18

## 2021-11-18 RX ORDER — ENOXAPARIN SODIUM 100 MG/ML
40 INJECTION SUBCUTANEOUS DAILY
Status: DISCONTINUED | OUTPATIENT
Start: 2021-11-19 | End: 2021-11-19 | Stop reason: HOSPADM

## 2021-11-18 RX ORDER — LIDOCAINE HYDROCHLORIDE 20 MG/ML
INJECTION, SOLUTION EPIDURAL; INFILTRATION; INTRACAUDAL; PERINEURAL AS NEEDED
Status: DISCONTINUED | OUTPATIENT
Start: 2021-11-18 | End: 2021-11-18 | Stop reason: HOSPADM

## 2021-11-18 RX ORDER — HYDROMORPHONE HYDROCHLORIDE 4 MG/1
4 TABLET ORAL
Status: DISCONTINUED | OUTPATIENT
Start: 2021-11-18 | End: 2021-11-19

## 2021-11-18 RX ORDER — OXYCODONE HYDROCHLORIDE 5 MG/1
10 TABLET ORAL
Status: CANCELLED | OUTPATIENT
Start: 2021-11-18

## 2021-11-18 RX ORDER — CARVEDILOL 12.5 MG/1
25 TABLET ORAL
Status: COMPLETED | OUTPATIENT
Start: 2021-11-18 | End: 2021-11-18

## 2021-11-18 RX ORDER — SODIUM CHLORIDE, SODIUM LACTATE, POTASSIUM CHLORIDE, CALCIUM CHLORIDE 600; 310; 30; 20 MG/100ML; MG/100ML; MG/100ML; MG/100ML
125 INJECTION, SOLUTION INTRAVENOUS CONTINUOUS
Status: DISCONTINUED | OUTPATIENT
Start: 2021-11-18 | End: 2021-11-19 | Stop reason: HOSPADM

## 2021-11-18 RX ORDER — NALOXONE HYDROCHLORIDE 0.4 MG/ML
0.4 INJECTION, SOLUTION INTRAMUSCULAR; INTRAVENOUS; SUBCUTANEOUS AS NEEDED
Status: DISCONTINUED | OUTPATIENT
Start: 2021-11-18 | End: 2021-11-19 | Stop reason: HOSPADM

## 2021-11-18 RX ORDER — KETAMINE HYDROCHLORIDE 10 MG/ML
INJECTION, SOLUTION INTRAMUSCULAR; INTRAVENOUS AS NEEDED
Status: DISCONTINUED | OUTPATIENT
Start: 2021-11-18 | End: 2021-11-18 | Stop reason: HOSPADM

## 2021-11-18 RX ORDER — HYDROMORPHONE HYDROCHLORIDE 2 MG/ML
0.5 INJECTION, SOLUTION INTRAMUSCULAR; INTRAVENOUS; SUBCUTANEOUS AS NEEDED
Status: DISCONTINUED | OUTPATIENT
Start: 2021-11-18 | End: 2021-11-18 | Stop reason: HOSPADM

## 2021-11-18 RX ORDER — MORPHINE SULFATE 2 MG/ML
1 INJECTION, SOLUTION INTRAMUSCULAR; INTRAVENOUS
Status: DISCONTINUED | OUTPATIENT
Start: 2021-11-18 | End: 2021-11-19 | Stop reason: HOSPADM

## 2021-11-18 RX ORDER — FENTANYL CITRATE 50 UG/ML
50 INJECTION, SOLUTION INTRAMUSCULAR; INTRAVENOUS
Status: DISCONTINUED | OUTPATIENT
Start: 2021-11-18 | End: 2021-11-18 | Stop reason: HOSPADM

## 2021-11-18 RX ORDER — SODIUM CHLORIDE 0.9 % (FLUSH) 0.9 %
5-40 SYRINGE (ML) INJECTION EVERY 8 HOURS
Status: DISCONTINUED | OUTPATIENT
Start: 2021-11-18 | End: 2021-11-19 | Stop reason: HOSPADM

## 2021-11-18 RX ORDER — CEFAZOLIN SODIUM/WATER 2 G/20 ML
2 SYRINGE (ML) INTRAVENOUS ONCE
Status: COMPLETED | OUTPATIENT
Start: 2021-11-18 | End: 2021-11-18

## 2021-11-18 RX ORDER — DIPHENHYDRAMINE HCL 25 MG
25 CAPSULE ORAL
Status: DISCONTINUED | OUTPATIENT
Start: 2021-11-18 | End: 2021-11-19 | Stop reason: HOSPADM

## 2021-11-18 RX ORDER — CARVEDILOL 12.5 MG/1
25 TABLET ORAL 2 TIMES DAILY WITH MEALS
Status: DISCONTINUED | OUTPATIENT
Start: 2021-11-18 | End: 2021-11-19 | Stop reason: HOSPADM

## 2021-11-18 RX ADMIN — CEFAZOLIN 2 G: 1 INJECTION, POWDER, FOR SOLUTION INTRAVENOUS at 13:35

## 2021-11-18 RX ADMIN — TRANEXAMIC ACID 1 G: 10 INJECTION, SOLUTION INTRAVENOUS at 13:35

## 2021-11-18 RX ADMIN — ROPIVACAINE HYDROCHLORIDE 15 ML: 5 INJECTION, SOLUTION EPIDURAL; INFILTRATION; PERINEURAL at 12:13

## 2021-11-18 RX ADMIN — FERROUS SULFATE TAB 325 MG (65 MG ELEMENTAL FE) 325 MG: 325 (65 FE) TAB at 17:52

## 2021-11-18 RX ADMIN — KETAMINE HYDROCHLORIDE 20 MG: 10 INJECTION, SOLUTION INTRAMUSCULAR; INTRAVENOUS at 13:35

## 2021-11-18 RX ADMIN — DIPHENHYDRAMINE HYDROCHLORIDE 25 MG: 25 CAPSULE ORAL at 22:34

## 2021-11-18 RX ADMIN — DEXMEDETOMIDINE HYDROCHLORIDE 25 MCG: 100 INJECTION, SOLUTION INTRAVENOUS at 12:11

## 2021-11-18 RX ADMIN — HYDROMORPHONE HYDROCHLORIDE 0.5 MG: 1 INJECTION, SOLUTION INTRAMUSCULAR; INTRAVENOUS; SUBCUTANEOUS at 14:33

## 2021-11-18 RX ADMIN — ONDANSETRON HYDROCHLORIDE 4 MG: 2 INJECTION INTRAMUSCULAR; INTRAVENOUS at 13:45

## 2021-11-18 RX ADMIN — TRANEXAMIC ACID 1 G: 10 INJECTION, SOLUTION INTRAVENOUS at 15:25

## 2021-11-18 RX ADMIN — KETAMINE HYDROCHLORIDE 10 MG: 10 INJECTION, SOLUTION INTRAMUSCULAR; INTRAVENOUS at 13:45

## 2021-11-18 RX ADMIN — CEFAZOLIN SODIUM 2 G: 100 INJECTION, POWDER, LYOPHILIZED, FOR SOLUTION INTRAVENOUS at 22:07

## 2021-11-18 RX ADMIN — PHENYLEPHRINE HYDROCHLORIDE 50 MCG: 10 INJECTION INTRAVENOUS at 13:41

## 2021-11-18 RX ADMIN — FENTANYL CITRATE 50 MCG: 50 INJECTION, SOLUTION INTRAMUSCULAR; INTRAVENOUS at 16:06

## 2021-11-18 RX ADMIN — SODIUM CHLORIDE, SODIUM LACTATE, POTASSIUM CHLORIDE, AND CALCIUM CHLORIDE 125 ML/HR: 600; 310; 30; 20 INJECTION, SOLUTION INTRAVENOUS at 11:00

## 2021-11-18 RX ADMIN — HYDROMORPHONE HYDROCHLORIDE 0.5 MG: 1 INJECTION, SOLUTION INTRAMUSCULAR; INTRAVENOUS; SUBCUTANEOUS at 13:47

## 2021-11-18 RX ADMIN — KETAMINE HYDROCHLORIDE 10 MG: 10 INJECTION, SOLUTION INTRAMUSCULAR; INTRAVENOUS at 14:05

## 2021-11-18 RX ADMIN — FENTANYL CITRATE 100 MCG: 50 INJECTION, SOLUTION INTRAMUSCULAR; INTRAVENOUS at 12:11

## 2021-11-18 RX ADMIN — MIDAZOLAM 2 MG: 1 INJECTION INTRAMUSCULAR; INTRAVENOUS at 12:11

## 2021-11-18 RX ADMIN — DEXAMETHASONE SODIUM PHOSPHATE 4 MG: 10 INJECTION, SOLUTION INTRAMUSCULAR; INTRAVENOUS at 12:11

## 2021-11-18 RX ADMIN — SODIUM CHLORIDE, SODIUM LACTATE, POTASSIUM CHLORIDE, AND CALCIUM CHLORIDE: 600; 310; 30; 20 INJECTION, SOLUTION INTRAVENOUS at 14:30

## 2021-11-18 RX ADMIN — SODIUM CHLORIDE, SODIUM LACTATE, POTASSIUM CHLORIDE, AND CALCIUM CHLORIDE 125 ML/HR: 600; 310; 30; 20 INJECTION, SOLUTION INTRAVENOUS at 17:53

## 2021-11-18 RX ADMIN — CARVEDILOL 25 MG: 12.5 TABLET, FILM COATED ORAL at 11:30

## 2021-11-18 RX ADMIN — HYDROMORPHONE HYDROCHLORIDE 0.5 MG: 2 INJECTION INTRAMUSCULAR; INTRAVENOUS; SUBCUTANEOUS at 16:24

## 2021-11-18 RX ADMIN — ONDANSETRON 4 MG: 2 INJECTION INTRAMUSCULAR; INTRAVENOUS at 18:36

## 2021-11-18 RX ADMIN — ROPIVACAINE HYDROCHLORIDE 20 ML: 5 INJECTION, SOLUTION EPIDURAL; INFILTRATION; PERINEURAL at 12:11

## 2021-11-18 RX ADMIN — HYDROMORPHONE HYDROCHLORIDE 1 MG: 1 INJECTION, SOLUTION INTRAMUSCULAR; INTRAVENOUS; SUBCUTANEOUS at 15:58

## 2021-11-18 RX ADMIN — HYDROMORPHONE HYDROCHLORIDE 0.5 MG: 2 INJECTION INTRAMUSCULAR; INTRAVENOUS; SUBCUTANEOUS at 16:34

## 2021-11-18 RX ADMIN — LIDOCAINE HYDROCHLORIDE 60 MG: 20 INJECTION, SOLUTION INTRAVENOUS at 13:30

## 2021-11-18 RX ADMIN — HYDROMORPHONE HYDROCHLORIDE 0.5 MG: 2 INJECTION INTRAMUSCULAR; INTRAVENOUS; SUBCUTANEOUS at 16:14

## 2021-11-18 RX ADMIN — HYDROMORPHONE HYDROCHLORIDE 4 MG: 4 TABLET ORAL at 17:51

## 2021-11-18 RX ADMIN — PROPOFOL 180 MG: 10 INJECTION, EMULSION INTRAVENOUS at 13:30

## 2021-11-18 RX ADMIN — HYDROMORPHONE HYDROCHLORIDE 0.5 MG: 1 INJECTION, SOLUTION INTRAMUSCULAR; INTRAVENOUS; SUBCUTANEOUS at 13:42

## 2021-11-18 RX ADMIN — HYDROMORPHONE HYDROCHLORIDE 0.5 MG: 1 INJECTION, SOLUTION INTRAMUSCULAR; INTRAVENOUS; SUBCUTANEOUS at 14:30

## 2021-11-18 RX ADMIN — HYDROMORPHONE HYDROCHLORIDE 4 MG: 4 TABLET ORAL at 22:20

## 2021-11-18 RX ADMIN — FENTANYL CITRATE 50 MCG: 50 INJECTION, SOLUTION INTRAMUSCULAR; INTRAVENOUS at 16:05

## 2021-11-18 RX ADMIN — KETAMINE HYDROCHLORIDE 10 MG: 10 INJECTION, SOLUTION INTRAMUSCULAR; INTRAVENOUS at 13:40

## 2021-11-18 NOTE — INTERVAL H&P NOTE
Update History & Physical    The Patient's History and Physical was reviewed with the patient and I examined the patient. There was no change. The surgical site was confirmed by the patient and me. Plan:  The risk, benefits, expected outcome, and alternative to the recommended procedure have been discussed with the patient. Patient understands and wants to proceed with the procedure.     Electronically signed by Juliane Lombardo DO on 11/18/2021 at 1:02 PM

## 2021-11-18 NOTE — ANESTHESIA PROCEDURE NOTES
Peripheral Block    Start time: 11/18/2021 12:09 PM  End time: 11/18/2021 12:11 PM  Performed by: Nieves Ny MD  Authorized by: Nieves Ny MD       Pre-procedure: Indications: at surgeon's request and post-op pain management    Preanesthetic Checklist: patient identified, risks and benefits discussed, site marked, timeout performed, anesthesia consent given and patient being monitored    Timeout Time: 12:09 EST          Block Type:   Block Type: Adductor canal block  Laterality:  Right  Monitoring:  Standard ASA monitoring, responsive to questions, oxygen, continuous pulse ox, frequent vital sign checks and heart rate  Injection Technique:  Single shot  Procedures: ultrasound guided    Patient Position: supine  Prep: DuraPrep    Location:  Mid thigh  Needle Type:  Stimuplex  Needle Gauge:  20 G  Needle Localization:  Ultrasound guidance  Medication Injected:  FentaNYL citrate (PF) injection sedation initial, 100 mcg  midazolam (VERSED) injection, 2 mg  ropivacaine (PF) (NAROPIN) 5 mg/mL (0.5 %) injection, 20 mL  dexamethasone (PF) (DECADRON) 10 mg/mL injection, 4 mg  dexmedeTOMidine (PRECEDEX) 100 mcg/mL iv solution, 25 mcg  Med Admin Time: 11/18/2021 12:11 PM    Assessment:  Number of attempts:  1  Injection Assessment:  Incremental injection every 5 mL, negative aspiration for CSF, no paresthesia, ultrasound image on chart, local visualized surrounding nerve on ultrasound, negative aspiration for blood and no intravascular symptoms  Patient tolerance:  Patient tolerated the procedure well with no immediate complications  Ultrasound guidance was used to view and verify medication disbursement and was also used for needle placement.

## 2021-11-18 NOTE — PROGRESS NOTES
Patient transferred to [address] [place] [address]     11/18/21 5599   Vital Signs   Temp 98.1 °F (36.7 °C)   Temp Source Oral   Pulse (Heart Rate) 90   Resp Rate 16   /70   BP 1 Location Right arm   Oxygen Therapy   O2 Sat (%) 100 %   Pulse via Oximetry 90 beats per minute   O2 Device Nasal cannula   O2 Flow Rate (L/min) 3 l/min

## 2021-11-18 NOTE — BRIEF OP NOTE
Brief Postoperative Note    Patient: Mimi Jones  YOB: 1960  MRN: [id number]    Date of Procedure: 11/18/2021     Pre-Op Diagnosis: RIGHT KNEE PAIN, A MECHANICAL COMPLICATION OF INTERNAL ORTH DEVICES, IMPLANTS AND GRAFTS, INIT    Post-Op Diagnosis: Same as preoperative diagnosis. Procedure(s):  REVISION RIGHT TOTAL KNEE    Surgeon(s):  Rodney Ellison DO    Surgical Assistant: Physician Assistant: Rhina Hays PA-C  Surg Asst-1: Hi Thomas    Anesthesia: General     Estimated Blood Loss (mL): less than 50     Complications: None    Specimens: * No specimens in log *     Implants:   Implant Name Type Inv.  Item Serial No.  Lot No. LRB No. Used Action   AUGMENT FEM SZ 3 THK8MM STD R DST TI LORETO PFC SIG - SN/A  AUGMENT FEM SZ 3 THK8MM STD R DST TI LORETO PFC SIG N/A [facility] [employer] [facility] [id number] Right 1 Implanted   AUGMENT FEM SZ 3 THK8MM STD R DST TI LORETO PFC SIG - SN/A  AUGMENT FEM SZ 3 THK8MM STD R DST TI LORETO PFC SIG N/A [facility] [id number] Right 1 Implanted   COMPONENT FEM SZ 3 R KNEE CO CHROM CRUCE RET NP LORETO SANTI PFC - SN/A  COMPONENT FEM SZ 3 R KNEE CO CHROM CRUCE RET NP LORETO SANTI PFC N/A [facility] [employer] [facility] [id number] Right 1 Implanted   ADAPTER FEM 5DEG KNEE PFC SIG - SN/A  ADAPTER FEM 5DEG KNEE PFC SIG N/A [facility] [employer] [facility] [id number] Right 1 Implanted   STEM FEM L115MM [id number] UNIV KNEE FLUT PRESSFIT FOR ROT HNG - SN/A  STEM FEM L115MM [id number] UNIV KNEE FLUT PRESSFIT FOR ROT HNG N/A [facility] [employer] [facility] [id number] Right 1 Implanted   SLEEVE FEM L34MM UNIV MTPHSEAL FULL POR LPS - SN/A  SLEEVE FEM L34MM UNIV MTPHSEAL FULL POR LPS N/A [employer] [facility] [id number] Right 1 Implanted   AUGMENT FEM SZ 3 THK4MM STD POST TI LORETO SANTI PFC SIG - SN/A  AUGMENT FEM SZ 3 THK4MM STD POST TI LORETO SANTI PFC SIG N/A [facility] [employer]_WD [id number] Right 1 Implanted   AUGMENT FEM SZ 3 THK4MM STD POST TI LORETO SANTI PFC SIG - SN/A  AUGMENT FEM SZ 3 THK4MM STD POST TI LORETO SANTI PFC SIG N/A [facility]_ [id number] Right 1 Implanted   ADAPTER FEM NEUT KNEE BOLT PFC SIG - SN/A  ADAPTER FEM NEUT KNEE BOLT PFC SIG N/A [facility] [id number] Right 1 Implanted   INSERT TIB SZ 3 THK22.5MM KNEE GVF POLYETH STBL ROT PLATFRM - SN/A  INSERT TIB SZ 3 THK22.5MM KNEE GVF POLYETH STBL ROT PLATFRM N/A [facility] [employer] [facility] [id number] Right 1 Implanted   COMPONENT PAT [id number] DST KNEE POLY OVL DOME 3 PEG NP LORETO - SN/A  COMPONENT PAT [id number] DST KNEE POLY OVL DOME 3 PEG NP LORETO N/A [facility] [employer]_ [id number] Right 1 Implanted   CEMENT BNE 20GM HALF DOSE PMMA W/ GENT HI VISC RADPQ FAST - SN/A  CEMENT BNE 20GM HALF DOSE PMMA W/ GENT HI VISC RADPQ FAST N/A [facility] [employer] [facility] [id number] Right 1 Implanted   CEMENT BNE 20GM HALF DOSE PMMA W/ GENT HI VISC RADPQ FAST - SN/A  CEMENT BNE 20GM HALF DOSE PMMA W/ GENT HI VISC RADPQ FAST N/A [facility] [employer]THOPEDICS_ [id number] Right 2 Implanted       Drains: * No LDAs found *    Findings: loose femoral prosthesis and eccentric tracking patella     Electronically Signed by Nel Castaneda DO on 11/18/2021 at 4:22 PM

## 2021-11-18 NOTE — PERIOP NOTES
18 [facility] made aware that SBAR is ready for review. Patient assigned room #208.  Vaishnavi Ness will be the nurse

## 2021-11-18 NOTE — ANESTHESIA PREPROCEDURE EVALUATION
Anesthetic History   No history of anesthetic complications     Pertinent negatives: No PONV       Review of Systems / Medical History  Patient summary reviewed, nursing notes reviewed and pertinent labs reviewed    Pulmonary          Smoker (quit 2013)    Pertinent negatives: No COPD, asthma and sleep apnea     Neuro/Psych         Headaches (Migraines) and psychiatric history (depression, bipolar by history)  Pertinent negatives: No seizures and CVA   Cardiovascular    Hypertension          Past MI (question of an MI in the past) and CAD  Pertinent negatives: No CHF  Exercise tolerance: >4 METS     GI/Hepatic/Renal     GERD        Pertinent negatives: No liver disease and renal disease   Endo/Other        Morbid obesity and arthritis  Pertinent negatives: No diabetes   Other Findings              Physical Exam    Airway  Mallampati: I  TM Distance: 4 - 6 cm  Neck ROM: normal range of motion   Mouth opening: Normal     Cardiovascular  Regular rate and rhythm,  S1 and S2 normal,  no murmur, click, rub, or gallop  Rhythm: regular  Rate: normal         Dental    Dentition: Full upper dentures, Full lower dentures and Edentulous     Pulmonary  Breath sounds clear to auscultation               Abdominal  GI exam deferred       Other Findings            Anesthetic Plan    ASA: 3  Anesthesia type: general      Post-op pain plan if not by surgeon: peripheral nerve block single    Induction: Intravenous  Anesthetic plan and risks discussed with: Patient

## 2021-11-18 NOTE — PERIOP NOTES
TRANSFER - OUT REPORT:    Verbal report given to Junaid BUNDY(name) on Michael Loera  being transferred to [address] [place] [address](unit) for routine post - op       Report consisted of patients Situation, Background, Assessment and   Recommendations(SBAR). Information from the following report(s) SBAR, OR Summary, Procedure Summary, Intake/Output and MAR was reviewed with the receiving nurse. Lines:   Peripheral IV 11/18/21 Left Hand (Active)   Site Assessment Clean, dry, & intact 11/18/21 1635   Phlebitis Assessment 0 11/18/21 1635   Infiltration Assessment 0 11/18/21 1635   Dressing Status Clean, dry, & intact 11/18/21 1635   Dressing Type Transparent; Tape 11/18/21 1635   Hub Color/Line Status Green; Infusing 11/18/21 1635        Opportunity for questions and clarification was provided.       Patient transported with:   O2 @ 3 liters  Registered Nurse

## 2021-11-18 NOTE — PROGRESS NOTES
Problem: Mobility Impaired (Adult and Pediatric)  Goal: *Acute Goals and Plan of Care (Insert Text)  Description: PT goals to be met in 1 day:  Pt will be able to perform supine<>sit SBA for transfers at home. Pt will be able to perform sit<>stand SBA for increased ability to transfer at home safely. Pt will be able to participate in gt training >100' w/ RW, WBAT, GB and CGA/SBA for improved ability in home upon d/c. Pt will be able to perform stair training step to pattern, B/U rail and CGA to obtain safe entry into home upon d/c. Pt will be educated regarding HEP per MD protocol for optimal AROM/strength outcomes. Note: []  Patient has met MD mobilization critieria for d/c home   []  Recommend HH with 24 hour adult care   [x]  Benefit from additional acute PT session to address:  progress transfer training; gait training     PHYSICAL THERAPY EVALUATION    Patient: Michael Loera (29 y.o. female)  Date: 11/18/2021  Primary Diagnosis: Complication of internal knee prosthesis ([facility] [address] [K31. 9XXA, Z96.659]  Procedure(s) (LRB):  REVISION RIGHT TOTAL KNEE (Right) Day of Surgery   Precautions:   Fall, WBAT    PLOF: Independent w/ SPC prn    ASSESSMENT :  Based on the objective data described below, the patient presents with decreased mobility in regards to bed mobility, transfers, gt quality and tolerance, balance, stair negotiation and safety due to R TKA rev surgery. Decreased AROM of R knee, dec strength of R knee, pain in R knee, dec sensation of R knee, c/o dizziness/light headedness/nausea also impacting pt functional mobility. Pt rating pain on numerical pain scale pre/post and during session 9/10. Pt ed regarding mobility safety, WB, HEP, ice application/use, elevation, environmental safety and need to use call bell for OOB activity. Pt able to perform supine<>sit w/ CGA/min A additional time and sit<>stand w/ CGA. Safety vc required throughout session to reinforce safety.   Pt able to participate in gt training using RW, GB, WBAT and CGA w/ antalgic gt pattern. Answered questions by pt in regards to PT and mobility. Pt left supine in bed w/ all needs within reach, B SCD reapplied and ice pack to R knee. Nurse Daniel Medrano aware of session and outcomes. Recommend HHPT with responsible adult care at least 24 hours upon hospital d/c. Patient will benefit from skilled intervention to address the above impairments. Patient's rehabilitation potential is considered to be Good  Factors which may influence rehabilitation potential include:   []         None noted  []         Mental ability/status  []         Medical condition  []         Home/family situation and support systems  []         Safety awareness  [x]         Pain tolerance/management  []         Other:      PLAN :  Recommendations and Planned Interventions:   [x]           Bed Mobility Training             []    Neuromuscular Re-Education  [x]           Transfer Training                   []    Orthotic/Prosthetic Training  [x]           Gait Training                          []    Modalities  [x]           Therapeutic Exercises           []    Edema Management/Control  [x]           Therapeutic Activities            [x]    Family Training/Education  [x]           Patient Education  []           Other (comment):    Frequency/Duration: Patient will be followed by physical therapy 1-2 times per day/4-7 days per week to address goals. Discharge Recommendations: Home Health  Further Equipment Recommendations for Discharge: N/A     SUBJECTIVE:   Patient stated I have had both my knees done and I went to rehab before.     OBJECTIVE DATA SUMMARY:     Past Medical History:   Diagnosis Date    Anemia     Arthritic-like pain     Arthritis     Binge-purge behavior June, 2013    Pt admits to binge eating and purging 3 times in the last four months.     Bipolar disorder ([facility] [address]     CAD (coronary artery disease) 2004    questionable hx of MI    Depression     Hx of smoking     Hypercholesteremia     Hypertension     Menopause     Migraine     Morbid obesity ([facility] [address]     Postmenopausal     Sickle cell trait ([facility] [address]      Past Surgical History:   Procedure Laterality Date    DELIVERY       HX CHOLECYSTECTOMY      laparoscopic    HX CYST INCISION AND DRAINAGE      HX FEMUR FRACTURE TX      HX GI  2013    sleeve resection    HX KNEE ARTHROSCOPY  2010    right    HX KNEE REPLACEMENT  2011    bilateral    HX ORTHOPAEDIC      left knee revision     Barriers to Learning/Limitations: yes;  anesthesia  Compensate with: Visual Cues, Verbal Cues, and Tactile Cues  Home Situation:  Home Situation  Home Environment: Private residence  # Steps to Enter: 1  One/Two Story Residence: Two story  # of Interior Steps: 13  Interior Rails: Left  Lift Chair Available: No  Living Alone: Yes  Support Systems: Spouse/Significant Other, Child(jackeline)  Patient Expects to be Discharged to[de-identified] [employer]  Current DME Used/Available at Home: Cane, straight, Raised toilet seat, Shower chair, Walker, rolling  Tub or Shower Type: Tub/Shower combination  Critical Behavior:  Neurologic State: Alert; Drowsy  Orientation Level: Oriented to person; Oriented to place; Oriented to situation  Cognition: Appropriate decision making; Appropriate for age attention/concentration; Appropriate safety awareness; Follows commands  Safety/Judgement: Awareness of environment  Psychosocial  Patient Behaviors: Calm; Cooperative  Purposeful Interaction: Yes  Pt Identified Daily Priority: Clinical issues (comment)  Caritas Process: Nurture loving kindness  Caring Interventions: Reassure  Reassure: Informing;  Therapeutic listening  Skin Condition/Temp: Dry; Warm  Skin Integrity: Incision (comment) (R knee)  Skin Integumentary  Skin Color: Appropriate for ethnicity  Skin Condition/Temp: Dry; Warm  Skin Integrity: Incision (comment) (R knee)  Strength:    Strength: Generally decreased, functional  Tone & Sensation:   Tone: Normal  Sensation: Impaired (R knee)  Range Of Motion:  AROM: Generally decreased, functional  Posture:  Functional Mobility:  Bed Mobility:  Supine to Sit: Contact guard assistance; Additional time (vc)  Sit to Supine: Minimum assistance; Additional time (vc)  Scooting: Contact guard assistance; Additional time (vc)  Transfers:  Sit to Stand: Contact guard assistance; Additional time (vc)  Stand to Sit: Contact guard assistance; Additional time (vc)  Balance:   Sitting: Intact  Standing: Intact; With support  Ambulation/Gait Training:  Distance (ft): 60 Feet (ft)  Assistive Device: Walker, rolling; Gait belt  Ambulation - Level of Assistance: Contact guard assistance; Additional time (vc)  Gait Abnormalities: Antalgic; Decreased step clearance; Step to gait  Right Side Weight Bearing: As tolerated  Base of Support: Shift to left  Stance: Right decreased  Speed/Elaina: Slow  Step Length: Left shortened; Right shortened  Swing Pattern: Left asymmetrical; Right asymmetrical  Interventions: Safety awareness training; Tactile cues; Verbal cues; Visual/Demos  Therapeutic Exercises:   Encouraged HEP  Pain:  Pain level pre-treatment: 9/10   Pain level post-treatment: 9/10   Pain Intervention(s) : Medication (see MAR); Rest, Ice, Repositioning  Response to intervention: Nurse notified, See doc flow    Activity Tolerance:   Fair   Please refer to the flowsheet for vital signs taken during this treatment. After treatment:   []         Patient left in no apparent distress sitting up in chair  [x]         Patient left in no apparent distress in bed  [x]         Call bell left within reach  [x]         Nursing notified  []         Caregiver present  []         Bed alarm activated  [x]         SCDs applied    COMMUNICATION/EDUCATION:   [x]         Role of Physical Therapy in the acute care setting. [x]         Fall prevention education was provided and the patient/caregiver indicated understanding.   [x]         Patient/family have participated as able in goal setting and plan of care. [x]         Patient/family agree to work toward stated goals and plan of care. []         Patient understands intent and goals of therapy, but is neutral about his/her participation. []         Patient is unable to participate in goal setting/plan of care: ongoing with therapy staff.  []         Other:     Thank you for this referral.  Merlin Destine, PT   Time Calculation: 29 mins      Eval Complexity: History: HIGH Complexity :3+ comorbidities / personal factors will impact the outcome/ POC Exam:MEDIUM Complexity : 3 Standardized tests and measures addressing body structure, function, activity limitation and / or participation in recreation  Presentation: LOW Complexity : Stable, uncomplicated  Clinical Decision Making:Low Complexity    Overall Complexity:LOW

## 2021-11-18 NOTE — ANESTHESIA POSTPROCEDURE EVALUATION
Post-Anesthesia Evaluation and Assessment    Cardiovascular Function/Vital Signs  Visit Vitals  /72 (BP 1 Location: Right arm, BP Patient Position: At rest)   Pulse 88   Temp 36.2 °C (97.1 °F)   Resp 22   Ht 5' 4\" (1.626 m)   Wt 99 kg (218 lb 3.2 oz)   SpO2 99%   BMI 37.45 kg/m²       Patient is status post Procedure(s):  REVISION RIGHT TOTAL KNEE. Nausea/Vomiting: Controlled. Postoperative hydration reviewed and adequate. Pain:  Pain Scale 1: Numeric (0 - 10) (11/18/21 1632)  Pain Intensity 1: 4 (11/18/21 1632)   Managed. Neurological Status:   Neuro (WDL): Exceptions to WDL (11/18/21 1624)   At baseline. Mental Status and Level of Consciousness: Baseline and appropriate for discharge. Pulmonary Status:   O2 Device: Nasal cannula (11/18/21 1624)   Adequate oxygenation and airway patent. Complications related to anesthesia: None    Post-anesthesia assessment completed. No concerns. Patient has met all discharge requirements.     Signed By: Shanell Cha MD    November 18, 2021

## 2021-11-18 NOTE — ANESTHESIA PROCEDURE NOTES
Peripheral Block    Start time: 11/18/2021 12:11 PM  End time: 11/18/2021 12:13 PM  Performed by: Nieves Ny MD  Authorized by: Nieves Ny MD       Pre-procedure: Indications: at surgeon's request and post-op pain management    Preanesthetic Checklist: patient identified, risks and benefits discussed, site marked, timeout performed, anesthesia consent given and patient being monitored    Timeout Time: 12:11 EST          Block Type:   Block Type:  IPACK  Laterality:  Right  Monitoring:  Standard ASA monitoring, responsive to questions, oxygen, continuous pulse ox, frequent vital sign checks and heart rate  Injection Technique:  Single shot  Procedures: ultrasound guided    Patient Position: supine  Prep: DuraPrep    Location:  Lower thigh  Needle Type:  Stimuplex  Needle Gauge:  20 G  Needle Localization:  Ultrasound guidance  Medication Injected:  Ropivacaine (PF) (NAROPIN) 5 mg/mL (0.5 %) injection, 15 mL  Med Admin Time: 11/18/2021 12:13 PM    Assessment:  Number of attempts:  1  Injection Assessment:  Incremental injection every 5 mL, negative aspiration for CSF, no paresthesia, ultrasound image on chart, local visualized surrounding nerve on ultrasound, negative aspiration for blood and no intravascular symptoms  Patient tolerance:  Patient tolerated the procedure well with no immediate complications  Ultrasound guidance was used to view and verify medication disbursement and was also used for needle placement.

## 2021-11-18 NOTE — PERIOP NOTES
Reviewed PTA medication list with patient/caregiver and patient/caregiver denies any additional medications. Patient admits to having a responsible adult care for them at home for at least 24 hours after surgery. Patient encouraged to use gown warming system and informed that using said warming gown to regulate body temperature prior to a procedure has been shown to help reduce the risks of blood clots and infection. Patient's pharmacy of choice verified and documented in PTA medication section. Dual skin assessment & fall risk band verification completed with JEFFERSON Sargent.

## 2021-11-19 VITALS
RESPIRATION RATE: 18 BRPM | OXYGEN SATURATION: 100 % | WEIGHT: 218.2 LBS | SYSTOLIC BLOOD PRESSURE: 109 MMHG | HEIGHT: 64 IN | TEMPERATURE: 98.5 F | HEART RATE: 83 BPM | DIASTOLIC BLOOD PRESSURE: 65 MMHG | BODY MASS INDEX: 37.25 KG/M2

## 2021-11-19 LAB
ERYTHROCYTE [DISTWIDTH] IN BLOOD BY AUTOMATED COUNT: 14.6 % (ref 11.6–14.5)
HCT VFR BLD AUTO: 31.1 % (ref 35–45)
HGB BLD-MCNC: 9.8 G/DL (ref 12–16)
MCH RBC QN AUTO: 25.7 PG (ref 24–34)
MCHC RBC AUTO-ENTMCNC: 31.5 G/DL (ref 31–37)
MCV RBC AUTO: 81.4 FL (ref 78–100)
NRBC # BLD: 0 K/UL (ref 0–0.01)
NRBC BLD-RTO: 0 PER 100 WBC
PLATELET # BLD AUTO: 267 K/UL (ref 135–420)
PMV BLD AUTO: 10 FL (ref 9.2–11.8)
RBC # BLD AUTO: 3.82 M/UL (ref 4.2–5.3)
WBC # BLD AUTO: 8 K/UL (ref 4.6–13.2)

## 2021-11-19 PROCEDURE — 74011250637 HC RX REV CODE- 250/637: Performed by: PHYSICIAN ASSISTANT

## 2021-11-19 PROCEDURE — 97165 OT EVAL LOW COMPLEX 30 MIN: CPT

## 2021-11-19 PROCEDURE — 85027 COMPLETE CBC AUTOMATED: CPT

## 2021-11-19 PROCEDURE — 97116 GAIT TRAINING THERAPY: CPT

## 2021-11-19 PROCEDURE — 74011250636 HC RX REV CODE- 250/636: Performed by: PHYSICIAN ASSISTANT

## 2021-11-19 PROCEDURE — 74011250636 HC RX REV CODE- 250/636: Performed by: ORTHOPAEDIC SURGERY

## 2021-11-19 PROCEDURE — 74011000250 HC RX REV CODE- 250: Performed by: PHYSICIAN ASSISTANT

## 2021-11-19 PROCEDURE — 36415 COLL VENOUS BLD VENIPUNCTURE: CPT

## 2021-11-19 PROCEDURE — 97535 SELF CARE MNGMENT TRAINING: CPT

## 2021-11-19 RX ORDER — OXYCODONE HYDROCHLORIDE 5 MG/1
10 TABLET ORAL
Status: DISCONTINUED | OUTPATIENT
Start: 2021-11-19 | End: 2021-11-19 | Stop reason: HOSPADM

## 2021-11-19 RX ORDER — OXYCODONE HYDROCHLORIDE 10 MG/1
10 TABLET ORAL
Qty: 28 TABLET | Refills: 0 | Status: SHIPPED | OUTPATIENT
Start: 2021-11-19 | End: 2021-11-26

## 2021-11-19 RX ORDER — OXYCODONE HYDROCHLORIDE 5 MG/1
5 TABLET ORAL
Status: DISCONTINUED | OUTPATIENT
Start: 2021-11-19 | End: 2021-11-19 | Stop reason: HOSPADM

## 2021-11-19 RX ORDER — ENOXAPARIN SODIUM 100 MG/ML
40 INJECTION SUBCUTANEOUS DAILY
Qty: 21 EACH | Refills: 0 | Status: SHIPPED | OUTPATIENT
Start: 2021-11-20 | End: 2021-12-11

## 2021-11-19 RX ADMIN — BUPROPION HYDROCHLORIDE 150 MG: 150 TABLET, EXTENDED RELEASE ORAL at 07:05

## 2021-11-19 RX ADMIN — OXYCODONE 10 MG: 5 TABLET ORAL at 08:45

## 2021-11-19 RX ADMIN — VILAZODONE HYDROCHLORIDE 40 MG: 20 TABLET ORAL at 08:53

## 2021-11-19 RX ADMIN — FERROUS SULFATE TAB 325 MG (65 MG ELEMENTAL FE) 325 MG: 325 (65 FE) TAB at 08:47

## 2021-11-19 RX ADMIN — DOCUSATE SODIUM 100 MG: 100 CAPSULE ORAL at 08:48

## 2021-11-19 RX ADMIN — HYDROXYZINE HYDROCHLORIDE 25 MG: 25 TABLET, FILM COATED ORAL at 03:43

## 2021-11-19 RX ADMIN — DIPHENHYDRAMINE HYDROCHLORIDE 12.5 MG: 50 INJECTION, SOLUTION INTRAMUSCULAR; INTRAVENOUS at 00:10

## 2021-11-19 RX ADMIN — MORPHINE SULFATE 1 MG: 2 INJECTION, SOLUTION INTRAMUSCULAR; INTRAVENOUS at 13:21

## 2021-11-19 RX ADMIN — MORPHINE SULFATE 1 MG: 2 INJECTION, SOLUTION INTRAMUSCULAR; INTRAVENOUS at 00:10

## 2021-11-19 RX ADMIN — ENOXAPARIN SODIUM 40 MG: 100 INJECTION SUBCUTANEOUS at 08:48

## 2021-11-19 RX ADMIN — DIPHENHYDRAMINE HYDROCHLORIDE 12.5 MG: 50 INJECTION, SOLUTION INTRAMUSCULAR; INTRAVENOUS at 07:10

## 2021-11-19 RX ADMIN — CARVEDILOL 25 MG: 12.5 TABLET, FILM COATED ORAL at 08:48

## 2021-11-19 RX ADMIN — CEFAZOLIN SODIUM 2 G: 100 INJECTION, POWDER, LYOPHILIZED, FOR SOLUTION INTRAVENOUS at 07:03

## 2021-11-19 RX ADMIN — HYDROMORPHONE HYDROCHLORIDE 4 MG: 4 TABLET ORAL at 03:44

## 2021-11-19 RX ADMIN — OXYCODONE 10 MG: 5 TABLET ORAL at 14:23

## 2021-11-19 NOTE — PROGRESS NOTES
1918 - Bedside report received from Junaid, [facility]. Patient in bed. Pain 3/10.     1930 - Patient in bed at this time. IV to [address]  intact and patent. Sequential compression device bilaterally. TEDs to LLE. Dressing to R knee intact but has a quarter size old drge. + CMS. Pt A & O x 4. LS clear, on RA. Abdomen soft, NT and ND. + BS to all 4 quadrants. Denies nausea. Pain 3/10. Call light within reach. 2359-Pt had c/o itching earlier after getting Dilaudid and cefazoline, benadryl PO given, but pt says the pain and the itching is getting worse. Talked to Dr Sarita Pollard, orders received to give 12.5 mg of benadryl and the PRN morphine. 0012-Benadryl and IV morphine given per MD orders. 0050-Pt called again, reports that itching is getting worse. Pt wiped down with CHG wipes, verbalized improvement. Pt left without any other issues. Will monitor. 0222-Pt's itching is gone, now playing games on her cp.     0245-Pt called again, reports that pain is still bad (8/10) and that she cannot sleep. Pt made aware that she cannot get another dose of IV benadryl until 6 AM. Pt given atarax with the PO Dilaudid and vowed that she will bear with the itching if it comes back. 0713-Tried to test if cefazolin caused the itching (see notes above). Gave the dose at 0703 and waited to see  if itching would get worse (Pt still itching \"alitte at this time\"). At 0712-had to give the benadryl dose as pt said itched had increased. 0728-Alicia the PA made aware of all above pt's issues about itching and all meds given. PA made aware that pt claims that PO Dilaudid is not working and wants oxycodone. Says she can take it with benadryl since she is allergic to percocet. Day RN aware, will f/u. No other issues.

## 2021-11-19 NOTE — PROGRESS NOTES
Progress Note      Patient: Brielle Bates               Sex: female          DOA: 11/18/2021     YOB: 1960      Age:  64 y.o.        LOS:  LOS: 0 days     Status Post: Procedure(s):  REVISION RIGHT TOTAL KNEE  Surgery Date: 11/18/2021            Subjective:     Brielle Bates is a 64 y.o. female who c/o moderate right knee pain. C/O itching with dilaudid and patient reports dilaudid not helpful for postoperative pain. Patient denies any complaint of calf pain/ SOB or difficulty breathing. Objective:      Visit Vitals  BP (!) 122/52   Pulse 84   Temp 97.9 °F (36.6 °C)   Resp 18   Ht 5' 4\" (1.626 m)   Wt 99 kg (218 lb 3.2 oz)   SpO2 100%   BMI 37.45 kg/m²       Physical Exam:   Dressing: small area of breakthrough bleeding noted to bandage. Wiggles Toes/Ankle  Foot sensation intact to light touch  No foot edema/ +1 Posterior Tibial Pulse    Intake and Output:  Current Shift:  11/19 0701 - 11/19 1900  In: 3542 [P.O.:1800; I.V.:1469]  Out: -   Last three shifts:  11/17 1901 - 11/19 0700  In: 1703.8 [I.V.:1703.8]  Out: 1050 [Urine:1000]  Voiding Status:  + void without need for Dyson catheter    Lab/Data Reviewed:  Recent Labs     11/19/21  0420   HGB 9.8*   HCT 31.1*         Medications Reviewed    Assessment/Plan     Active Problems:    Complication of internal knee prosthesis ([facility] [address] (11/18/2021)        · Discontinue Oxygen. · Change IV to Saline Lock. · Discharge Planning for home. · Begin DVT Prophylaxis - Lovenox 40 mg SQ daily  · Discharge home today if cleared by physical therapy. · Continue aggressive PT and ROM exercises       The patient was seen and examined by Dr. Luisito Solis today as well and he is in agreement with above.

## 2021-11-19 NOTE — PROGRESS NOTES
Problem: Mobility Impaired (Adult and Pediatric)  Goal: *Acute Goals and Plan of Care (Insert Text)  Description: PT goals to be met in 1 day:  Pt will be able to perform supine<>sit SBA for transfers at home. Pt will be able to perform sit<>stand SBA for increased ability to transfer at home safely. Pt will be able to participate in gt training >100' w/ RW, WBAT, GB and CGA/SBA for improved ability in home upon d/c. Pt will be able to perform stair training step to pattern, B/U rail and CGA to obtain safe entry into home upon d/c. Pt will be educated regarding HEP per MD protocol for optimal AROM/strength outcomes. 11/19/2021 1255 by Roddy Cheng, PTA  Outcome: Progressing Towards Goal  11/19/2021 1052 by Roddy Cheng, PTA  Outcome: Progressing Towards Goal   [x]  Patient has met MD mobilization critieria for d/c home   [x]  Recommend HH with 24 hour adult care   []  Benefit from additional acute PT session to address:      PHYSICAL THERAPY TREATMENT    Patient: Zenia Pineda (48 y.o. female)  Date: 11/19/2021  Diagnosis: Complication of internal knee prosthesis ([facility] [address] [I72. 9XXA, Z96.659]   <principal problem not specified>  Procedure(s) (LRB):  REVISION RIGHT TOTAL KNEE (Right) 1 Day Post-Op  Precautions: Fall, WBAT  PLOF:     ASSESSMENT:  Pt asleep but easily aroused. Increased time needed to carry out bed mobility. Elevated bed to height of bed at home, no difficulty performing sit to stand. Ambulated to stair well with RW, step to gt pattern, decreased pace. Negotiated 3 steps only due to pain, pt has flight of stairs at home and is choosing to stay on the 1st floor. Voided in bathroom, independent with terra-care. Returned to supine in bed.   Progression toward goals:   []      Improving appropriately and progressing toward goals  [x]      Improving slowly and progressing toward goals  []      Not making progress toward goals and plan of care will be adjusted PLAN:  Patient continues to benefit from skilled intervention to address the above impairments. Continue treatment per established plan of care. Discharge Recommendations:  Home Health  Further Equipment Recommendations for Discharge:  rolling walker     SUBJECTIVE:   Patient stated I think I am going to have to stay down stairs.     OBJECTIVE DATA SUMMARY:   Critical Behavior:  Neurologic State: Alert  Orientation Level: Oriented X4  Cognition: Appropriate for age attention/concentration  Safety/Judgement: Awareness of environment  Functional Mobility Training:  Bed Mobility:    Supine to Sit: Additional time; Supervision  Sit to Supine: Stand-by assistance; Additional time  Scooting: Supervision  Transfers:  Sit to Stand: Stand-by assistance  Stand to Sit: Supervision  Balance:  Sitting: Intact  Standing: Intact; With support   Ambulation/Gait Training:  Distance (ft): 50 Feet (ft)  Assistive Device: Gait belt; Walker, rolling  Ambulation - Level of Assistance: Supervision  Gait Abnormalities: Antalgic; Decreased step clearance; Step to gait  Right Side Weight Bearing: As tolerated  Speed/Elaina: Slow  Step Length: Left shortened; Right shortened      Pain:  Pain level pre-treatment: 5/10  Pain level post-treatment: 7/10   Pain Intervention(s): Medication (see MAR); Rest, Ice, Repositioning   Response to intervention: Nurse notified, See doc flow    Activity Tolerance:   Fair  Please refer to the flowsheet for vital signs taken during this treatment. After treatment:   [] Patient left in no apparent distress sitting up in chair  [x] Patient left in no apparent distress in bed  [x] Call bell left within reach  [x] Nursing notified  [] Caregiver present  [] Bed alarm activated  [x] SCDs applied      COMMUNICATION/EDUCATION:   [x]         Role of Physical Therapy in the acute care setting. [x]         Fall prevention education was provided and the patient/caregiver indicated understanding.   [x] Patient/family have participated as able in working toward goals and plan of care. [x]         Patient/family agree to work toward stated goals and plan of care. []         Patient understands intent and goals of therapy, but is neutral about his/her participation.   []         Patient is unable to participate in stated goals/plan of care: ongoing with therapy staff.  []         Other:        Niraj Duncan PTA   Time Calculation: 17 mins

## 2021-11-19 NOTE — PROGRESS NOTES
Message sent to in-patient care manager, FELICIANO Vora requesting an ACP discussion with patient while she is -in-patient. Patient remains a current in-patient at [facility]. Oral and Written notification given to patient and/or caregiver informing them that they are currently an Outpatient receiving care in our facility. Outpatient services include Observation Services under [place] and [facility] requirements.

## 2021-11-19 NOTE — PROGRESS NOTES
Verbal shift change report given to Luis Miguel Pruett RN by Hilary Valderrama RN. Report included the following information SBAR, Kardex, Summary, Intake/Output and MAR.

## 2021-11-19 NOTE — PROGRESS NOTES
Problem: Falls - Risk of  Goal: *Absence of Falls  Description: Document Niurka Arguelles Fall Risk and appropriate interventions in the flowsheet.   Outcome: Progressing Towards Goal  Note: Fall Risk Interventions:  Mobility Interventions: Communicate number of staff needed for ambulation/transfer, Patient to call before getting OOB, Utilize walker, cane, or other assistive device    Mentation Interventions: Adequate sleep, hydration, pain control    Medication Interventions: Patient to call before getting OOB, Teach patient to arise slowly    Elimination Interventions: Call light in reach, Patient to call for help with toileting needs

## 2021-11-19 NOTE — PROGRESS NOTES
OCCUPATIONAL THERAPY EVALUATION/DISCHARGE    Patient: Serenity Ferrari (9 y.o. female)  Date: 2021  Primary Diagnosis: Complication of internal knee prosthesis ([facility] [address] [T84. 9XXA, Z96.659]  Procedure(s) (LRB):  REVISION RIGHT TOTAL KNEE (Right) 1 Day Post-Op   Precautions:   Fall, WBAT  PLOF: independent with ADLs and transfers     ASSESSMENT AND RECOMMENDATIONS:  Based on the objective data described below, the patient presents to be requiring S for ADLs and transfers following above mentioned surgical procedure. Pt presented supine in bed and agrees to participate with therapy. Pt performed bed mobility, supine<>sit, sit<>stand transfers and LB dressing with S during this session. Pt was provided with AEs for LB ADLs. Pt was left supine in bed at the end of session in NAD. Skilled occupational therapy is not indicated at this time. Discharge Recommendations: Home Health  Further Equipment Recommendations for Discharge: N/A      SUBJECTIVE:   Patient stated  I am doing alright.     OBJECTIVE DATA SUMMARY:     Past Medical History:   Diagnosis Date    Anemia     Arthritic-like pain     Arthritis     Binge-purge behavior     Pt admits to binge eating and purging 3 times in the last four months.     Bipolar disorder ([address]     CAD (coronary artery disease) 2004    questionable hx of MI    Depression     Hx of smoking     Hypercholesteremia     Hypertension     Menopause     Migraine     Morbid obesity ([address]     Postmenopausal     Sickle cell trait ([facility] [address]      Past Surgical History:   Procedure Laterality Date    DELIVERY       HX CHOLECYSTECTOMY      laparoscopic    HX CYST INCISION AND DRAINAGE      HX FEMUR FRACTURE TX      HX GI  2013    sleeve resection    HX KNEE ARTHROSCOPY      right    HX KNEE REPLACEMENT  2011    bilateral    HX ORTHOPAEDIC  -    left knee revision     Barriers to Learning/Limitations: None  Compensate with: visual, verbal, tactile, kinesthetic cues/model    Home Situation:   Home Situation  Home Environment: Private residence  # Steps to Enter: 1  One/Two Story Residence: Two story  # of Interior Steps: 13  Interior Rails: Left  Lift Chair Available: No  Living Alone: Yes  Support Systems: Spouse/Significant Other, Child(jackeline)  Patient Expects to be Discharged to[de-identified] [employer]  Current DME Used/Available at Home: Cane, straight, Raised toilet seat, Shower chair, Walker, rolling  Tub or Shower Type: Tub/Shower combination  []     Right hand dominant   []     Left hand dominant    Cognitive/Behavioral Status:  Neurologic State: Alert  Orientation Level: Oriented X4  Cognition: Appropriate for age attention/concentration; Follows commands  Safety/Judgement: Fall prevention    Skin: intact  Edema: none    Vision/Perceptual:    Tracking: Able to track stimulus in all quadrants w/o difficulty    Coordination: BUE  Coordination: Within functional limits  Fine Motor Skills-Upper: Left Intact; Right Intact    Gross Motor Skills-Upper: Left Intact; Right Intact  Balance:  Sitting: Intact  Standing: Intact;  With support  Strength: BUE  Strength: Generally decreased, functional  Tone & Sensation: BUE  Tone: Normal  Sensation: Intact  Range of Motion: BUE  AROM: Generally decreased, functional  Functional Mobility and Transfers for ADLs:  Bed Mobility:  Supine to Sit: Supervision  Sit to Supine: Supervision  Scooting: Supervision  Transfers:  Sit to Stand: Supervision  Stand to Sit: Supervision  ADL Assessment:  Feeding: Independent    Oral Facial Hygiene/Grooming: Supervision    Upper Body Dressing: Independent    Lower Body Dressing: Supervision    Toileting: Supervision  ADL Intervention:  Lower Body Dressing Assistance  Dressing Assistance: Supervision  Socks: Supervision  Leg Crossed Method Used: No  Position Performed: Long sitting on bed  Cues: Don  Cognitive Retraining  Safety/Judgement: Fall prevention    Pain:  Pain level pre-treatment: 7/10   Pain level post-treatment: 7/10   Pain Intervention(s): Medication (see MAR); Rest, Ice, Repositioning   Response to intervention: Nurse notified, See doc flow    Activity Tolerance:   Good     Please refer to the flowsheet for vital signs taken during this treatment. After treatment:   []  Patient left in no apparent distress sitting up in chair  [x]  Patient left in no apparent distress in bed  [x]  Call bell left within reach  [x]  Nursing notified  []  Caregiver present  []  Bed alarm activated    COMMUNICATION/EDUCATION:   [x]      Role of Occupational Therapy in the acute care setting  [x]      Home safety education was provided and the patient/caregiver indicated understanding. [x]      Patient/family have participated as able and agree with findings and recommendations. []      Patient is unable to participate in plan of care at this time. Thank you for this referral.  Sofia Prescott, OTR/L  Time Calculation: 10 mins      Eval Complexity: History: LOW Complexity : Brief history review ; Examination: LOW Complexity : 1-3 performance deficits relating to physical, cognitive , or psychosocial skils that result in activity limitations and / or participation restrictions ;    Decision Making:LOW Complexity : No comorbidities that affect functional and no verbal or physical assistance needed to complete eval tasks

## 2021-11-19 NOTE — NURSE NAVIGATOR
Sorin Balderrama Rounded on post total knee replacement. Patient educated: Activity:   OOB for all meals,   Walk short distances every hour during the day and evening to promote circulation, help move better and lessen stiffness. Also helps to get the muscles stretched and strong. When elevating leg and sitting do not put anything under knee. IT is important to work on getting the leg stretched out and as straight as possible. Promoting circulation  Ankle pumps 10 times an hour at hospital & home. Take medications as prescribed by provider. Pain Control:  Pain medications side effects of constipation, nausea, dizziness, itching reviewed. Reminded patient swelling, bruising and increased pain are normal at home. To help decrease swelling after surgery it is safe to lie down and elevate legs above heart to help decrease swelling. Use pillows while keeping the surgical knee straight when elevating. Use ice, distraction, moving, & change position to help with pain. Rest between activity. Educated that medications can cause nausea and decreased appetite so eat a snack before taking medication. Narcotics cause constipation so take stool softener/mild laxative daily while on narcotics. Incentive Spirometry:    Use of incentive spirometer 10 x/hr. Diet:   Eat for healing. Drink plenty of fluids so urine is lemonade in color. Patient Safety:   Call light & belongings in reach. Call for help when want to walk or get OOB. Sorin Balderrama verbalized understand. Given the opportunity for asking questions.       Orthopaedic Navigator

## 2021-11-19 NOTE — PROGRESS NOTES
Transition of Care (KWADWO) Plan:          Pt admitted for an elective surgical procedure- patient hadre. Pt is independent. Please encourage ambulation. No transition of care needs identified at this time. Anticipate pt will be medically stable for discharge within the next 24-48 hours with physician follow up. CM available to assist as needed. Verified patient has home care - boyfriend and son who lives nearby; has rollator but son is purchasing RW today, has elevated toilet seat and shower chair. Verified FOC with [facility] with patient. [facility]WAD[facility] Transportation:   How is patient being transported at discharge? Family/Friend      When? Once cleared by physician     Is transport scheduled? N/A      Follow-up appointment and transportation:   PCP/Specialist?  See AVS for Appointment         Who is transporting to the follow-up appointment? Self/Family/Friend      Is transport for follow up appointment scheduled? N/A    Communication plan (with patient/family): Who is being called? Patient or Next of Kin? Responsible party? Patient      What number(s) is to be used? See Facesheet      What service provider is calling for [facility] services? When are they calling? Readmission Risk?   (Green/Low; Yellow/Moderate; Red/High):  [facility] here to complete [address] including selection of the Healthcare Decision Maker Relationship (ie \"Primary\")

## 2021-11-19 NOTE — PROGRESS NOTES
1456-This writer has reviewed discharge instructions with patient at this time. Patient has verbalized understanding. Patient was provided with care notes to include side effects of RX's. Arm bands removed and shredded. AVS reviewed with Bernarda BUNDY.

## 2021-11-19 NOTE — OP NOTES
[facility]  [facility]    Name:  Miguel Kee  MR#:   [id number]  :  1960  ACCOUNT #:  [de-identified]  DATE OF SERVICE:  2021    PREOPERATIVE DIAGNOSIS:  Knee pain, mechanical loosening as well as instability as well as eccentric patella. POSTOPERATIVE DIAGNOSES:  Knee pain, mechanical loosening as well as instability as well as eccentric patella, prosthetic loosening of femoral component. PROCEDURE PERFORMED:  Revision of right total knee femoral component as well as revision of patellar component and augmenting for stability. SURGEON:  Maribel Cornejo DO    ASSISTANT:  Neelam Castro PA-C. ANESTHESIA:  General  Adductor block. COMPLICATIONS:  None. SPECIMENS REMOVED:  None. IMPLANTS:  Per implant log list includes a size 3 TC3 femur with a 34 capsule sleeve femoral portion, a 14 x 115 stem, press-fit and augment 8 mm distal and 4 mm posterior, medial, and lateral as well as a 35-mm three-peg dome patella. ESTIMATED BLOOD LOSS:  50 mL. IV FLUIDS:  1500 mL. OPERATIVE COURSE:  A 70-year-old presented to the office with continued complaints of pain, difficulty with range of motion, and weightbearing symptoms on the right knee. On evaluation, noted to have some mild laxity, anterior drawer as well as eccentric tracking patella with osseous overgrowth. At that point, we continued with plans for revision of the knee. Based on those findings, risks and benefits were reviewed today of surgery and consent was obtained. Evaluated by Anesthesia, underwent adductor canal blockade, and then taken to surgical suite, placed in supine position, underwent general anesthesia. Extremity was prepped and draped after a nonsterile tourniquet was placed on the upper thigh.   Identifying the patient and procedure, an incision was made in the anterior portion, dissecting the old scar line and made a medial parapatellar approach to gain access to the knee, noting mild amounts of hemosiderin deposit about the synovium, mostly at the focal medial aspect of the femoral prosthesis as well as eccentric  patella. Taken through range of motion, mild laxity, anterior and posterior drawer, varus-valgus stress throughout with TC3 component. At that point, we continued with plans for removal of the polyethylene. On further investigation, the femoral component was noted to be loosened. At that point, we continued with plans for revision of the femoral component. Tibial component was intact and well fixed without obvious signs of loosening. At that point, we continue bony prep including sequential reaming and broaching. Additional time was taken as there was significant bone loss as well as sclerotic changes from the loose prosthesis, allowing for additional time for bony prep as well as augmentation to the distal femoral portion because of significant bone loss. As this added to the total time and complexity of the case, increasing all difficulty measures, we continued with plans progressive to sizing and trialing with a size 3, which gave a good stability, terminal flexion and extension with a 22.5-mm bearing. At that point, we continued with revision of the patellar prosthesis which was eccentrically cut. The old button was cut and was removed with oscillating saw. A plantar cut was made about the patella as well as removal of some mild residual osseous abnormality in the lateral margins of that patellar portion, size to a 35 drilled and then trialed with central tracking, good stability. Varus and valgus stress testing and posterior drawer with terminal extension 0 and flexion to 120 throughout. At that point, the trial components were removed. Utilized a template to construct the final components on the back table, including the distal and posterior augments to a 31-mm sleeve and appropriate rotation as trialed.   Copious irrigation and debridement of soft tissues about the knee was then performed as well as injection of the posterior capsule and periarticular capsule with Exofin, Marcaine for additional postsurgical analgesia. Once the bone was appropriately prepped, we continued implantation of the femoral component with cement about the bone implant interface, keeping free of the ingrowth portion of metaphyseal sleeve, which was packed into place with a good fit. Excess cement was then removed. Additional cement was placed in the bony voids about the anterior flange to fill. This was allowed to cure. We continued with our plans for placement of the patellar button, which was clamped into place and allowed to dry as well. Excess cement was removed, taken through range of motion, with good stability with a 22.5 polyethylene, which was selected, placed into tibial base, and taken through range of motion. Terminal extension 0, flexion 115-120, with good stability throughout. At that point, final irrigation with pulsatile lavage and dilute Betadine solution as well. Reapproximation of the capsule with running locked #1 PDS, followed by #1 Vicryl figure-of-eight, followed by  subcutaneous closure with #1 Vicryl, 2-0 Vicryl, and Exofin dermal bandage on skin. A long sterile dressing was then placed. The patient continued with aggressive physical therapy, DVT prophylaxis, and antibiotics for 24 hours. Again, all surgical ergonomics was made complicated by a history of previous revision prosthesis with significant bony loss and additional loosening, extending the time of procedure by at least 1 hour.       700 DO J LUIS [id number]  D:  11/18/2021 16:54  T:  11/19/2021 5:50  JOB #:  [id number]

## 2021-11-19 NOTE — ROUTINE PROCESS
Bedside and Verbal shift change report given to [facility], RN by Aldo Lima. Report included the following information SBAR, Kardex, OR Summary, Intake/Output and MAR.

## 2021-11-19 NOTE — PROGRESS NOTES
Problem: Mobility Impaired (Adult and Pediatric)  Goal: *Acute Goals and Plan of Care (Insert Text)  Description: PT goals to be met in 1 day:  Pt will be able to perform supine<>sit SBA for transfers at home. Pt will be able to perform sit<>stand SBA for increased ability to transfer at home safely. Pt will be able to participate in gt training >100' w/ RW, WBAT, GB and CGA/SBA for improved ability in home upon d/c. Pt will be able to perform stair training step to pattern, B/U rail and CGA to obtain safe entry into home upon d/c. Pt will be educated regarding HEP per MD protocol for optimal AROM/strength outcomes. Outcome: Progressing Towards Goal   []  Patient has met MD mobilization critieria for d/c home   []  Recommend HH with 24 hour adult care   [x]  Benefit from additional acute PT session to address:  stair negotiation    PHYSICAL THERAPY TREATMENT    Patient: Brittnee Benítez (61 y.o. female)  Date: 11/19/2021  Diagnosis: Complication of internal knee prosthesis ([facility] [N09. 9XXA, Z96.659]   <principal problem not specified>  Procedure(s) (LRB):  REVISION RIGHT TOTAL KNEE (Right) 1 Day Post-Op  Precautions: Fall, WBAT  PLOF: ambulatory with a RW occasionally    ASSESSMENT:  Pt sat up EOB without assistance, increased time needed to carry out. Elevated bed to height of bed at home (very tall), no difficulty performing sit to stand. Pt ambulated 100ft with RW, step to gt pattern, steady slow pace, 5 rest breaks needed due to UE pain and fatigue. Voided in bathroom, 225cc independent with terra-care. Returned to supine in bed without assistance. Will follow up later for stair nego. Progression toward goals:   [x]      Improving appropriately and progressing toward goals  []      Improving slowly and progressing toward goals  []      Not making progress toward goals and plan of care will be adjusted     PLAN:  Patient continues to benefit from skilled intervention to address the above impairments. Continue treatment per established plan of care. Discharge Recommendations:  Home Health  Further Equipment Recommendations for Discharge:  rolling walker     SUBJECTIVE:   Patient stated Yunier Soto had a bad morning.     OBJECTIVE DATA SUMMARY:   Critical Behavior:  Neurologic State: Alert  Orientation Level: Oriented X4  Cognition: Appropriate for age attention/concentration  Safety/Judgement: Awareness of environment  Functional Mobility Training:  Bed Mobility:    Supine to Sit: Supervision; Additional time  Sit to Supine: Supervision; Additional time  Scooting: Supervision  Transfers:  Sit to Stand: Supervision  Stand to Sit: Supervision  Balance:  Sitting: Intact  Standing: Intact; With support   Ambulation/Gait Training:  Distance (ft): 100 Feet (ft)  Assistive Device: Gait belt; Walker, rolling  Ambulation - Level of Assistance: Stand-by assistance  Gait Abnormalities: Antalgic; Decreased step clearance; Step to gait  Right Side Weight Bearing: As tolerated  Speed/Elaina: Slow  Step Length: Right shortened; Left shortened         Pain:  Pain level pre-treatment: 7/10  Pain level post-treatment: 7/10   Pain Intervention(s): Medication (see MAR); Rest, Ice, Repositioning   Response to intervention: Nurse notified, See doc flow    Activity Tolerance:   Fair  Please refer to the flowsheet for vital signs taken during this treatment. After treatment:   [] Patient left in no apparent distress sitting up in chair  [x] Patient left in no apparent distress in bed  [x] Call bell left within reach  [x] Nursing notified  [] Caregiver present  [] Bed alarm activated  [x] SCDs applied      COMMUNICATION/EDUCATION:   [x]         Role of Physical Therapy in the acute care setting. [x]         Fall prevention education was provided and the patient/caregiver indicated understanding. [x]         Patient/family have participated as able in working toward goals and plan of care.   [x]         Patient/family agree to work toward stated goals and plan of care. []         Patient understands intent and goals of therapy, but is neutral about his/her participation.   []         Patient is unable to participate in stated goals/plan of care: ongoing with therapy staff.  []         Other:        Estefania Machado PTA   Time Calculation: 26 mins

## 2021-11-22 ENCOUNTER — HOSPITAL ENCOUNTER (EMERGENCY)
Age: 61
Discharge: HOME | End: 2021-11-23
Attending: STUDENT IN AN ORGANIZED HEALTH CARE EDUCATION/TRAINING PROGRAM
Payer: MEDICARE

## 2021-11-22 DIAGNOSIS — Z98.890 RECENT MAJOR SURGERY: ICD-10-CM

## 2021-11-22 DIAGNOSIS — M25.561 ACUTE PAIN OF RIGHT KNEE: ICD-10-CM

## 2021-11-22 DIAGNOSIS — E87.6 HYPOKALEMIA: ICD-10-CM

## 2021-11-22 DIAGNOSIS — G57.01 PIRIFORMIS SYNDROME, RIGHT: Primary | ICD-10-CM

## 2021-11-22 LAB
ANION GAP SERPL CALC-SCNC: 2 MMOL/L (ref 3–18)
BUN SERPL-MCNC: 13 MG/DL (ref 7–18)
BUN/CREAT SERPL: 23 (ref 12–20)
CALCIUM SERPL-MCNC: 8.4 MG/DL (ref 8.5–10.1)
CHLORIDE SERPL-SCNC: 103 MMOL/L (ref 100–111)
CO2 SERPL-SCNC: 35 MMOL/L (ref 21–32)
CREAT SERPL-MCNC: 0.56 MG/DL (ref 0.6–1.3)
D DIMER PPP FEU-MCNC: 3.8 UG/ML(FEU)
GLUCOSE SERPL-MCNC: 118 MG/DL (ref 74–99)
LACTATE SERPL-SCNC: 1 MMOL/L (ref 0.4–2)
POTASSIUM SERPL-SCNC: 2.5 MMOL/L (ref 3.5–5.5)
SODIUM SERPL-SCNC: 140 MMOL/L (ref 136–145)

## 2021-11-22 PROCEDURE — 80048 BASIC METABOLIC PNL TOTAL CA: CPT

## 2021-11-22 PROCEDURE — 74011250637 HC RX REV CODE- 250/637: Performed by: STUDENT IN AN ORGANIZED HEALTH CARE EDUCATION/TRAINING PROGRAM

## 2021-11-22 PROCEDURE — 85025 COMPLETE CBC W/AUTO DIFF WBC: CPT

## 2021-11-22 PROCEDURE — 83605 ASSAY OF LACTIC ACID: CPT

## 2021-11-22 PROCEDURE — 83735 ASSAY OF MAGNESIUM: CPT

## 2021-11-22 PROCEDURE — 99284 EMERGENCY DEPT VISIT MOD MDM: CPT

## 2021-11-22 PROCEDURE — 85379 FIBRIN DEGRADATION QUANT: CPT

## 2021-11-22 RX ORDER — ACETAMINOPHEN 500 MG
1000 TABLET ORAL
Status: COMPLETED | OUTPATIENT
Start: 2021-11-22 | End: 2021-11-22

## 2021-11-22 RX ORDER — DIAZEPAM 2 MG/1
2 TABLET ORAL ONCE
Status: COMPLETED | OUTPATIENT
Start: 2021-11-22 | End: 2021-11-22

## 2021-11-22 RX ADMIN — ACETAMINOPHEN 1000 MG: 500 TABLET ORAL at 23:40

## 2021-11-22 RX ADMIN — DIAZEPAM 2 MG: 2 TABLET ORAL at 23:40

## 2021-11-23 ENCOUNTER — APPOINTMENT (OUTPATIENT)
Dept: VASCULAR SURGERY | Age: 61
End: 2021-11-23
Attending: STUDENT IN AN ORGANIZED HEALTH CARE EDUCATION/TRAINING PROGRAM
Payer: MEDICARE

## 2021-11-23 VITALS
HEIGHT: 64 IN | OXYGEN SATURATION: 100 % | SYSTOLIC BLOOD PRESSURE: 128 MMHG | DIASTOLIC BLOOD PRESSURE: 86 MMHG | RESPIRATION RATE: 22 BRPM | WEIGHT: 218 LBS | HEART RATE: 102 BPM | BODY MASS INDEX: 37.22 KG/M2 | TEMPERATURE: 99.1 F

## 2021-11-23 LAB
ANION GAP SERPL CALC-SCNC: 4 MMOL/L (ref 3–18)
BASOPHILS # BLD: 0 K/UL (ref 0–0.1)
BASOPHILS NFR BLD: 0 % (ref 0–2)
BUN SERPL-MCNC: 12 MG/DL (ref 7–18)
BUN/CREAT SERPL: 26 (ref 12–20)
CALCIUM SERPL-MCNC: 8.1 MG/DL (ref 8.5–10.1)
CHLORIDE SERPL-SCNC: 102 MMOL/L (ref 100–111)
CO2 SERPL-SCNC: 34 MMOL/L (ref 21–32)
CREAT SERPL-MCNC: 0.46 MG/DL (ref 0.6–1.3)
DIFFERENTIAL METHOD BLD: ABNORMAL
EOSINOPHIL # BLD: 0.2 K/UL (ref 0–0.4)
EOSINOPHIL NFR BLD: 3 % (ref 0–5)
ERYTHROCYTE [DISTWIDTH] IN BLOOD BY AUTOMATED COUNT: 14.3 % (ref 11.6–14.5)
GLUCOSE SERPL-MCNC: 111 MG/DL (ref 74–99)
HCT VFR BLD AUTO: 28 % (ref 35–45)
HGB BLD-MCNC: 9.1 G/DL (ref 12–16)
IMM GRANULOCYTES # BLD AUTO: 0.1 K/UL (ref 0–0.04)
IMM GRANULOCYTES NFR BLD AUTO: 1 % (ref 0–0.5)
LYMPHOCYTES # BLD: 1.8 K/UL (ref 0.9–3.6)
LYMPHOCYTES NFR BLD: 28 % (ref 21–52)
MAGNESIUM SERPL-MCNC: 2 MG/DL (ref 1.6–2.6)
MCH RBC QN AUTO: 26.4 PG (ref 24–34)
MCHC RBC AUTO-ENTMCNC: 32.5 G/DL (ref 31–37)
MCV RBC AUTO: 81.2 FL (ref 78–100)
MONOCYTES # BLD: 0.8 K/UL (ref 0.05–1.2)
MONOCYTES NFR BLD: 12 % (ref 3–10)
NEUTS SEG # BLD: 3.5 K/UL (ref 1.8–8)
NEUTS SEG NFR BLD: 56 % (ref 40–73)
NRBC # BLD: 0 K/UL (ref 0–0.01)
NRBC BLD-RTO: 0 PER 100 WBC
PLATELET # BLD AUTO: 308 K/UL (ref 135–420)
PLATELET COMMENTS,PCOM: ABNORMAL
PMV BLD AUTO: 10.4 FL (ref 9.2–11.8)
POTASSIUM SERPL-SCNC: 2.8 MMOL/L (ref 3.5–5.5)
RBC # BLD AUTO: 3.45 M/UL (ref 4.2–5.3)
RBC MORPH BLD: ABNORMAL
SODIUM SERPL-SCNC: 140 MMOL/L (ref 136–145)
WBC # BLD AUTO: 6.4 K/UL (ref 4.6–13.2)

## 2021-11-23 PROCEDURE — 74011250637 HC RX REV CODE- 250/637: Performed by: STUDENT IN AN ORGANIZED HEALTH CARE EDUCATION/TRAINING PROGRAM

## 2021-11-23 PROCEDURE — 93971 EXTREMITY STUDY: CPT

## 2021-11-23 PROCEDURE — 80048 BASIC METABOLIC PNL TOTAL CA: CPT

## 2021-11-23 RX ORDER — POTASSIUM CHLORIDE 20 MEQ/1
20 TABLET, EXTENDED RELEASE ORAL 3 TIMES DAILY
Qty: 9 TABLET | Refills: 0 | Status: SHIPPED | OUTPATIENT
Start: 2021-11-23 | End: 2021-11-26

## 2021-11-23 RX ORDER — POTASSIUM CHLORIDE 20 MEQ/1
40 TABLET, EXTENDED RELEASE ORAL ONCE
Status: COMPLETED | OUTPATIENT
Start: 2021-11-23 | End: 2021-11-23

## 2021-11-23 RX ORDER — POTASSIUM CHLORIDE 20 MEQ/1
40 TABLET, EXTENDED RELEASE ORAL
Status: COMPLETED | OUTPATIENT
Start: 2021-11-23 | End: 2021-11-23

## 2021-11-23 RX ORDER — ONDANSETRON 4 MG/1
4 TABLET, ORALLY DISINTEGRATING ORAL
Status: COMPLETED | OUTPATIENT
Start: 2021-11-23 | End: 2021-11-23

## 2021-11-23 RX ORDER — DIAZEPAM 2 MG/1
2 TABLET ORAL
Qty: 12 TABLET | Refills: 0 | Status: SHIPPED | OUTPATIENT
Start: 2021-11-23 | End: 2021-11-28

## 2021-11-23 RX ORDER — NALOXONE HYDROCHLORIDE 4 MG/.1ML
SPRAY NASAL
Qty: 1 EACH | Refills: 1 | Status: SHIPPED | OUTPATIENT
Start: 2021-11-23 | End: 2022-02-22

## 2021-11-23 RX ADMIN — POTASSIUM CHLORIDE 40 MEQ: 20 TABLET, EXTENDED RELEASE ORAL at 00:09

## 2021-11-23 RX ADMIN — POTASSIUM CHLORIDE 40 MEQ: 20 TABLET, EXTENDED RELEASE ORAL at 01:12

## 2021-11-23 RX ADMIN — ONDANSETRON 4 MG: 4 TABLET, ORALLY DISINTEGRATING ORAL at 00:09

## 2021-11-23 RX ADMIN — POTASSIUM CHLORIDE 40 MEQ: 20 TABLET, EXTENDED RELEASE ORAL at 02:28

## 2021-11-23 NOTE — ED PROVIDER NOTES
EMERGENCY DEPARTMENT HISTORY AND PHYSICAL EXAM      Date: 11/22/2021  Patient Name: Ashley Loera    History of Presenting Illness     Chief Complaint   Patient presents with    Leg Pain       History Provided By: Patient    HPI:  Ashley Loera is a 64 y.o. female with history of right knee total knee replacement 4 days ago who complains of right buttock pain, radiating down past knee, she also endorses right knee pain, and she is not able to take her Norco, as she is having a reaction at home. Causing Rash. She states her follow-up appointment is on the first, in which she would get her bandages taken down at that time. We will not removed now    She is currently using Lovenox at home, denies any fever, nausea, vomiting. She is doing her edge of bed physical therapy, is normally ambulating without difficulty. She denies any numbness, tingling, weakness. No chest pain or shortness of breath. The patient denies any aggravating or alleviating factors - and has not taken any medications in an attempt to alleviate her symptoms. PMH, PSH, family history, social history, allergies reviewed with the patient with significant items noted above. PCP: Marshal Francis, DO    Current Outpatient Medications   Medication Sig Dispense Refill    naloxone (Narcan) 4 mg/actuation nasal spray Use 1 spray intranasally, then discard. Repeat with new spray every 2 min as needed for opioid overdose symptoms, alternating nostrils. 1 Each 1    enoxaparin (LOVENOX) 40 mg/0.4 mL 0.4 mL by SubCUTAneous route daily for 21 days. 21 Each 0    linaCLOtide (Linzess) 145 mcg cap capsule Take  by mouth Daily (before breakfast).  ergocalciferol (Vitamin D2) 1,250 mcg (50,000 unit) capsule Take 1 Capsule by mouth every seven (7) days for 52 doses.  Indications: vitamin D deficiency (high dose therapy) 26 Capsule 1    Nurtec ODT 75 mg disintegrating tablet       Aimovig Autoinjector 140 mg/mL injection       multivitamin (ONE A DAY) tablet Take 1 Tablet by mouth daily.  cyanocobalamin (VITAMIN B12) 1,000 mcg/mL injection 1 mL by SubCUTAneous route every thirty (30) days. 30 mL 0    Syringe with Needle, Disp, 3 mL 25 x 5/8\" syrg 1 mL by SubCUTAneous route every thirty (30) days. 15 Syringe 0    carvediloL (Coreg) 25 mg tablet Take 25 mg by mouth two (2) times daily (with meals).  traZODone (DESYREL) 100 mg tablet Take 300 mg by mouth.  buPROPion XL (WELLBUTRIN XL) 150 mg tablet Take 150 mg by mouth every morning.  hydrOXYzine HCl (ATARAX) 25 mg tablet Take  by mouth three (3) times daily as needed for Itching.  vilazodone (VIIBRYD) 40 mg tab tablet Take  by mouth daily. Past History     Past Medical History:  Past Medical History:   Diagnosis Date    Anemia     Arthritic-like pain     Arthritis     Binge-purge behavior     Pt admits to binge eating and purging 3 times in the last four months.     Bipolar disorder ([facility]     CAD (coronary artery disease) 2004    questionable hx of MI    Depression     Hx of smoking     Hypercholesteremia     Hypertension     Menopause     Migraine     Morbid obesity (HCC)     Postmenopausal     Sickle cell trait ([address]        Past Surgical History:  Past Surgical History:   Procedure Laterality Date    DELIVERY   18    HX CHOLECYSTECTOMY      laparoscopic    HX CYST INCISION AND DRAINAGE      HX FEMUR FRACTURE TX      HX GI  2013    sleeve resection    HX KNEE ARTHROSCOPY      right    HX KNEE REPLACEMENT      bilateral    HX ORTHOPAEDIC      left knee revision       Family History:  Family History   Problem Relation Age of Onset    Heart Disease Mother         Mom was told she had irreg heart beat/hole in [place], [place] Breast Cancer Mother     Other Other         Non- contributory    Breast Cancer Maternal Grandmother     Malignant Hyperthermia Neg Hx     Pseudocholinesterase Deficiency Neg Hx     Delayed Awakening Neg Hx     Post-op Nausea/Vomiting Neg Hx     Emergence Delirium Neg Hx     Post-op Cognitive Dysfunction Neg Hx        Social History:  Social History     Tobacco Use    Smoking status: Former Smoker     Packs/day: 0.25     Years: 35.00     Pack years: 8.75     Types: Cigarettes     Quit date: 1/10/2017     Years since quittin.8    Smokeless tobacco: Never Used    Tobacco comment: states she smokes e-cigarettes   Substance Use Topics    Alcohol use: Yes     Alcohol/week: 0.8 standard drinks     Types: 1 Standard drinks or equivalent per week    Drug use: No       Allergies:   Allergies   Allergen Reactions    Percocet [Oxycodone-Acetaminophen] Itching       Review of Systems   Review of Systems    In addition to that documented in the HPI above  All other review of systems negative    Constitutional: Denies fevers or chills  Eyes: Denies vision changes  ENMT: Denies sore throat  CV: Denies chest pain  Resp: Denies SOB  GI: Denies vomiting or diarrhea  : Denies painful urination  MSK: Denies recent trauma  Skin: Denies new rashes  Neuro: Denies new numbness or tingling or weakness  Endocrine: Denies polyuria  Heme: Denies bleeding disorders    Physical Exam     Vitals:    21 2248 21 0028   BP: 128/86    Pulse: (!) 102    Resp: 22    Temp: 99.1 °F (37.3 °C)    SpO2: 99% 100%   Weight: 98.9 kg (218 lb)    Height: 5' 4\" (1.626 m)      Physical Exam    Nursing notes and vital signs reviewed  General: Patient is awake and alert, resting comfortably in no acute distress  Head: Normocephalic, Atraumatic  Eyes: EOMI, no conjunctival pallor  Neck: Supple, Normal external exam  Cardiovascular: RRR, no murmur auscultated, warm, well-perfused extremities  Chest: Normal work of breathing and chest excursion bilaterally  Respiratory: Patient is in no respiratory distress, lungs CTAB  Abdomen: Soft, non tender, non distended  Back: No evidence of trauma or deformity  Extremities: No evidence of trauma or deformity, edema about knee, appropriately tender   Skin: Warm and dry, intact  Neuro: Alert and appropriate, CN intact, normal speech, strength and sensation full and symmetric bilaterally, normal gait, normal coordination  Psychiatric: Normal mood and affect    Medical Decision Making   I am the first provider for this patient. I reviewed the vital signs, available nursing notes, past medical history, past surgical history, family history and social history. Vital Signs-Reviewed the patient's vital signs. Visit Vitals  /86 (BP 1 Location: Left upper arm, BP Patient Position: Sitting)   Pulse (!) 102   Temp 99.1 °F (37.3 °C)   Resp 22   Ht 5' 4\" (1.626 m)   Wt 98.9 kg (218 lb)   SpO2 100%   BMI 37.42 kg/m²       Pulse Oximetry Analysis - 100% on room air     Cardiac Monitor:  Rate: 102 bpm  Rhythm: SR    Provider Notes (Medical Decision Making):   Dorie Cesar is a 64 y.o. female with right total knee arthroplasty, approximately 4 days ago. Now with increased pain from her right buttock down to her leg, shooting, tender to palpation over the piriformis. Suspect piriformis syndrome. As well as tender to palpation problem probably appropriate for her large surgery about her right knee. She does have some tenderness in her calf, with mild swelling, will obtain a D-dimer for further classification, likely get PVLs. Moderate risk Wells score of 1. Procedures:  Procedures    ED Course:   ED Course as of 11/30/21 2204   Mon Nov 22, 2021   2347 CBC without leukocytosis or anemia lower than recent labs. [DM]   Tue Nov 23, 2021   0000 D-dimer elevated, will obtain right lower extremity PVL. Lactate is 1.0Hypokalemia 2.5, will give mag, 40 of potassium. [DM]   0045 Negative DVT study. [DM]   0106 Patient does not want to stay for recheck after second dose of potassium.  Agrees to take medication at home.  [DM]   200 High Sandee Vera, BASIC [DM] 0118 Giving second dose of potassium now. [DM]   0140 BMP in progress   [DM]   5788 Initially increased to 2.8  Will give another 40 of potassium which should take her level to 3.4. follow up for doses at home.    [DM]      ED Course User Index  [DM] Delfina Zaragoza MD      DVT negative     Hemoglobin 9.1 now, asymptomatic, down from 9.8 post surgery. We will recommend she follow-up with her PCP for further evaluation. No acute pathology necessitating further emergent workup or hospital admission is suspected or found. Will discharge home with follow up. She is comfortable with the plan and discharge at this time. Expressed the importance of follow up for current symptoms and she agrees and was advised on what signs/symptoms to return immediately to the ER. Vitals Review/addressed -     Diagnostic Study Results     Orders Placed This Encounter    CBC WITH AUTOMATED DIFF     Standing Status:   Standing     Number of Occurrences:   1    BASIC METABOLIC PANEL     Standing Status:   Standing     Number of Occurrences:   1    LACTIC ACID, PLASMA     Standing Status:   Standing     Number of Occurrences:   1    D DIMER     Standing Status:   Standing     Number of Occurrences:   1    MAGNESIUM     Standing Status:   Standing     Number of Occurrences:   1    METABOLIC PANEL, BASIC     Standing Status:   Standing     Number of Occurrences:   1    DUPLEX LOWER EXT VENOUS RIGHT     Standing Status:   Standing     Number of Occurrences:   1     Order Specific Question:   Transport     Answer:   BED [2]    diazePAM (VALIUM) tablet 2 mg    acetaminophen (TYLENOL) tablet 1,000 mg    potassium chloride (K-DUR, KLOR-CON M20) SR tablet 40 mEq    ondansetron (ZOFRAN ODT) tablet 4 mg    potassium chloride (K-DUR, KLOR-CON M20) SR tablet 40 mEq    diazePAM (Valium) 2 mg tablet     Sig: Take 1 Tablet by mouth every six (6) hours as needed for Anxiety (spasm) for up to 5 days.  Max Daily Amount: 8 mg.     Dispense:  12 Tablet     Refill:  0    naloxone (Narcan) 4 mg/actuation nasal spray     Sig: Use 1 spray intranasally, then discard. Repeat with new spray every 2 min as needed for opioid overdose symptoms, alternating nostrils. Dispense:  1 Each     Refill:  1    potassium chloride (K-DUR, KLOR-CON M20) 20 mEq tablet     Sig: Take 1 Tablet by mouth three (3) times daily for 3 days. Dispense:  9 Tablet     Refill:  0    potassium chloride (K-DUR, KLOR-CON M20) SR tablet 40 mEq       Labs -     No results found for this or any previous visit (from the past 12 hour(s)). Radiologic Studies -   DUPLEX LOWER EXT VENOUS RIGHT   Final Result        CT Results  (Last 48 hours)    None        CXR Results  (Last 48 hours)    None          Disposition     Disposition:  Home    CLINICAL IMPRESSION:    1. Piriformis syndrome, right    2. Recent major surgery    3. Acute pain of right knee    4. Hypokalemia        It should be noted that I will be the provider of record for this patient  Marily Hernandez MD    Follow-up Information     Follow up With Specialties Details Why [address]    THE [facility] EMERGENCY DEPT Emergency Medicine Go to  If symptoms worsen [address] [place] [address]  [facility], [address] [place] [address],  Internal Medicine   [address] [place] [address]  [phone]            Discharge Medication List as of 11/23/2021  2:25 AM      START taking these medications    Details   diazePAM (Valium) 2 mg tablet Take 1 Tablet by mouth every six (6) hours as needed for Anxiety (spasm) for up to 5 days. Max Daily Amount: 8 mg., Normal, Disp-12 Tablet, R-0      naloxone (Narcan) 4 mg/actuation nasal spray Use 1 spray intranasally, then discard. Repeat with new spray every 2 min as needed for opioid overdose symptoms, alternating nostrils. , Normal, Disp-1 Each, R-1      potassium chloride (K-DUR, KLOR-CON M20) 20 mEq tablet Take 1 Tablet by mouth three (3) times daily for 3 days. , Print, Disp-9 Tablet, R-0         CONTINUE these medications which have NOT CHANGED    Details   enoxaparin (LOVENOX) 40 mg/0.4 mL 0.4 mL by SubCUTAneous route daily for 21 days. , Normal, Disp-21 Each, R-0      oxyCODONE IR (ROXICODONE) 10 mg tab immediate release tablet Take 1 Tablet by mouth every six (6) hours as needed (Postop pain) for up to 7 days. Max Daily Amount: 40 mg., Normal, Disp-28 Tablet, R-0      linaCLOtide (Linzess) 145 mcg cap capsule Take  by mouth Daily (before breakfast). , Historical Med      ergocalciferol (Vitamin D2) 1,250 mcg (50,000 unit) capsule Take 1 Capsule by mouth every seven (7) days for 52 doses. Indications: vitamin D deficiency (high dose therapy), Normal, Disp-26 Capsule, R-1      Nurtec ODT 75 mg disintegrating tablet Historical Med, KYLE      Aimovig Autoinjector 140 mg/mL injection Historical Med, KYLE      multivitamin (ONE A DAY) tablet Take 1 Tablet by mouth daily. , Historical Med      cyanocobalamin (VITAMIN B12) 1,000 mcg/mL injection 1 mL by SubCUTAneous route every thirty (30) days. , Normal, Disp-30 mL, R-0      Syringe with Needle, Disp, 3 mL 25 x 5/8\" syrg 1 mL by SubCUTAneous route every thirty (30) days. , Normal, Disp-15 Syringe, R-0      carvediloL (Coreg) 25 mg tablet Take 25 mg by mouth two (2) times daily (with meals). , Historical Med      traZODone (DESYREL) 100 mg tablet Take 300 mg by mouth., Historical Med      buPROPion XL (WELLBUTRIN XL) 150 mg tablet Take 150 mg by mouth every morning., Historical Med      hydrOXYzine HCl (ATARAX) 25 mg tablet Take  by mouth three (3) times daily as needed for Itching., Historical Med      vilazodone (VIIBRYD) 40 mg tab tablet Take  by mouth daily. , Historical Med         STOP taking these medications       potassium chloride (Klor-Con) 20 mEq pack Comments:   Reason for Stopping:               Please note that this dictation was completed with [employer], the [employer] voice recognition software.   Quite often unanticipated grammatical, syntax, homophones, and other interpretive errors are inadvertently transcribed by the computer software. Please disregard these errors. Please excuse any errors that have escaped final proofreading.

## 2021-11-23 NOTE — DISCHARGE INSTRUCTIONS
VERA, Routine Care for Bruises, Sprains and Strains    The routine care of injuries may include any of the following: Rest, Ice, Compression, and Elevation (RICE). Rest is required to allow your body to heal. Usually following bumps and bruises, everyday activities can be resumed as soon as you are comfortable. Injured tendons (cord like structures that attach muscle to bone) and bones take about 6 weeks to heal.    Ice following an injury helps keep the swelling down. Ice helps reduce pain. Do not apply ice directly to skin. Apply ice bags (ice in a plastic bag wrapped in a towel to prevent frostbite to skin) about every 2 hours for 20 minutes, while awake, to the injured area for the first 24 hours to 48 hours. After the first 24 to 48 hours, use as directed by your caregiver. Compression helps keep swelling down, gives support, and helps with discomfort. If an ace bandage (stretchy, elastic wrapping bandage) has been applied today, it should be removed and reapplied every 3 to 4 hours. It should not be applied tightly. It should be applied firmly enough to keep swelling down. Watch your fingers or toes for swelling, bluish discoloration, coldness, numbness, or excessive pain. If any of these symptoms (problems) occur, remove the ace bandage and reapply more loosely. If these symptoms persist, contact your caregiver or return to this location. Elevation helps reduce swelling, and decreases pain. With extremities (arms/hands and legs/feet), the injured area should be placed near to or above the heart level if patient is able. If a splint, special shoe or cast has been applied (used) today, watch the parts involved for any of the above problems noted in use of compression. HOME CARE INSTRUCTIONS  Persistent pain and inability to use the injured area for more than 2 to 3 days are warning signs indicating  you should see a caregiver for a follow-up visit as soon as possible.      Initially, a hairline fracture (the same as a broken bone) may not be seen on x-rays. Persistent pain and swelling indicate that further evaluation, non-weight bearing (use of crutches as instructed), and/or further x-rays are needed. X-rays may sometimes not show a small fracture until a week or ten days later. Make a follow-up appointment with your own caregiver or one to whom you have been referred. Make a rice pack for heat and aches and pains    Using dry white rice and a tube sock. Fill the sock with rice. Leave enough room at the top so you can close the opening by  tying it with a rubber band or string -- basically anything you think will hold the rice in. Microwave on high for 1-2 minutes. Remove from the microwave (again, be careful, it will be hot). Apply to area that is sore. If you need more time once the heating pad has gone cold, microwave again for 1 minute and reapply. Making your own heating pad is cost-efficient and safer than using an electric heating pad. It also saves you a trip to the store, when youre too sore to leave the house. Schedule an appointment with your doctor if muscle and joint pain persists for several days. Acetaminophen (Tylenol)   Take two 325 mg every 6 hours or two 500 mg tylenol every 8 hours as needed for pain/fever (do not take other tylenol containing products ). The ideal maximum dose is 3 grams per day, which would be 6 extra strength Tylenol (500 mg each). Dont take more than that if possible, therefore it is better to take 650 mg every 6 hours. Ibuprofen  For the treatment of mild to moderate pain, 400 mg of ibuprofen will work but doesn't help for inflammation   You can take 600mg every 6 hours. Generally taken every 6 to 8 hours, the maximum dose of Ibuprofen is 2400 mg per day which is 12 over-the-counter tablets. You can alternate tylenol and ibuprofen every 3 hours so that you are taking something for fever/pain every 3 hours.      Please return to the ER with any new or worsening symptoms or any other concerns. Please return to the Emergency Department if you develop a fever, chills, cannot eat or drink due to nausea or vomiting, or if any of your symptoms worsen. Please follow up with PCP and orthopedic surgeon    Also, It is extremely important that you follow-up with a primary care physician and if you do not have one currently use the contact information provided to obtain an appointment. If none was provided please call the number on the back of your insurance card to locate a Primary care doctor. Many offices have \"cancellation lists\" that you can ask to be placed on; should a patient with an earlier appointment cancel you will be notified to take their place. Please return to the Emergency Room immediately if your symptoms worsen. Please carefully read all discharge instructions    [email or web address]    What are GoodRx coupons? GoodRx coupons will help you pay less than the cash price for your prescription.  Mires free to use and are accepted at virtually every [place] pharmacy. Your pharmacist will know how to enter the codes on the coupon to pull up the lowest discount available.

## 2021-11-23 NOTE — ED NOTES
Patient given another 40 mEq for a total of 120 mEq today and discharged. Patient chronically low in potassium and she did not want to stay overnight. Patient vital signs and EKG tracing remained stable throughout this ER stay.

## 2021-11-23 NOTE — ED NOTES
r Knee surgery this past Thursday.  C/o pain to knee, lower leg below knee and r hip shooting down to r foot

## 2022-01-19 DIAGNOSIS — I10 PRIMARY HYPERTENSION: ICD-10-CM

## 2022-01-19 DIAGNOSIS — E66.01 MORBID OBESITY (HCC): Primary | ICD-10-CM

## 2022-02-21 ENCOUNTER — NURSE NAVIGATOR (OUTPATIENT)
Dept: SURGERY | Age: 62
End: 2022-02-21

## 2022-02-21 ENCOUNTER — HOSPITAL ENCOUNTER (OUTPATIENT)
Dept: PREADMISSION TESTING | Age: 62
Discharge: HOME | End: 2022-02-21
Payer: MEDICAID

## 2022-02-21 ENCOUNTER — OFFICE VISIT (OUTPATIENT)
Dept: SURGERY | Age: 62
End: 2022-02-21

## 2022-02-21 ENCOUNTER — OFFICE VISIT (OUTPATIENT)
Dept: SURGERY | Age: 62
End: 2022-02-21
Payer: MEDICARE

## 2022-02-21 ENCOUNTER — HOSPITAL ENCOUNTER (OUTPATIENT)
Dept: LAB | Age: 62
Discharge: HOME | End: 2022-02-21

## 2022-02-21 VITALS
SYSTOLIC BLOOD PRESSURE: 131 MMHG | BODY MASS INDEX: 38 KG/M2 | HEIGHT: 64 IN | OXYGEN SATURATION: 98 % | TEMPERATURE: 98.2 F | DIASTOLIC BLOOD PRESSURE: 90 MMHG | WEIGHT: 222.6 LBS | HEART RATE: 96 BPM

## 2022-02-21 DIAGNOSIS — G89.18 POSTOPERATIVE PAIN: Primary | ICD-10-CM

## 2022-02-21 DIAGNOSIS — I10 PRIMARY HYPERTENSION: ICD-10-CM

## 2022-02-21 DIAGNOSIS — E66.01 MORBID OBESITY (HCC): ICD-10-CM

## 2022-02-21 DIAGNOSIS — E66.01 MORBID OBESITY (HCC): Primary | ICD-10-CM

## 2022-02-21 LAB
ABO + RH BLD: NORMAL
BLOOD GROUP ANTIBODIES SERPL: NORMAL
SPECIMEN EXP DATE BLD: NORMAL
XX-LABCORP SPECIMEN COL,LCBCF: NORMAL

## 2022-02-21 PROCEDURE — 86900 BLOOD TYPING SEROLOGIC ABO: CPT

## 2022-02-21 PROCEDURE — G8427 DOCREV CUR MEDS BY ELIG CLIN: HCPCS | Performed by: SPECIALIST

## 2022-02-21 PROCEDURE — G8754 DIAS BP LESS 90: HCPCS | Performed by: SPECIALIST

## 2022-02-21 PROCEDURE — 99215 OFFICE O/P EST HI 40 MIN: CPT | Performed by: SPECIALIST

## 2022-02-21 PROCEDURE — 93005 ELECTROCARDIOGRAM TRACING: CPT

## 2022-02-21 PROCEDURE — 3017F COLORECTAL CA SCREEN DOC REV: CPT | Performed by: SPECIALIST

## 2022-02-21 PROCEDURE — G8752 SYS BP LESS 140: HCPCS | Performed by: SPECIALIST

## 2022-02-21 PROCEDURE — 99001 SPECIMEN HANDLING PT-LAB: CPT

## 2022-02-21 PROCEDURE — G9717 DOC PT DX DEP/BP F/U NT REQ: HCPCS | Performed by: SPECIALIST

## 2022-02-21 PROCEDURE — G8417 CALC BMI ABV UP PARAM F/U: HCPCS | Performed by: SPECIALIST

## 2022-02-21 RX ORDER — DICYCLOMINE HYDROCHLORIDE 10 MG/1
CAPSULE ORAL
Status: ON HOLD | COMMUNITY
Start: 2021-11-21 | End: 2022-03-01

## 2022-02-21 RX ORDER — ONDANSETRON 8 MG/1
8 TABLET, ORALLY DISINTEGRATING ORAL
Qty: 60 TABLET | Refills: 0 | Status: SHIPPED | OUTPATIENT
Start: 2022-02-21 | End: 2022-02-22

## 2022-02-21 RX ORDER — ENOXAPARIN SODIUM 100 MG/ML
40 INJECTION SUBCUTANEOUS EVERY 12 HOURS
Qty: 20 EACH | Refills: 0 | Status: SHIPPED | OUTPATIENT
Start: 2022-02-21 | End: 2022-02-22

## 2022-02-21 RX ORDER — HYDROMORPHONE HYDROCHLORIDE 2 MG/1
2 TABLET ORAL
Qty: 28 TABLET | Refills: 0 | Status: SHIPPED | OUTPATIENT
Start: 2022-02-21 | End: 2022-02-22

## 2022-02-21 RX ORDER — POTASSIUM CHLORIDE 1500 MG/1
40 TABLET, EXTENDED RELEASE ORAL 2 TIMES DAILY
COMMUNITY
Start: 2022-01-04 | End: 2022-03-02

## 2022-02-21 NOTE — PROGRESS NOTES
Patient attended bariatric surgery pre-operative education class today. Patient appeared to have an understanding of the surgical procedure, pre-op and post-op risks, and lifestyle changes. Patient asked appropriate questions, and all questions were answered in their entirety. This RN read Patient Contract for Bariatric Surgery to patient, as they read along using their own copy, and patient initialed, signed, and dated contract during this time. Patient was given a bariatric binder of resources; form acknowledging receipt of said book was completed. Lovenox injections and Zofran were sent to patient's pharmacy on file.

## 2022-02-21 NOTE — PROGRESS NOTES
CLINICAL NUTRITION PRE-OPERATIVE EDUCATION    Patient's Name: Matilda Ho   Age: 58 y.o. YOB: 1960   Sex: female    Education & Materials Provided: Post-op Binder to include:   Liquid Diet Shopping List    Supplemental Resource Guide: MVI, B12, Calcium Citrate, Vitamin D, Vitamin B50, and iron recommendations   Protein Supplement Information    Fluid Requirements/ No Straws   No Caffeine or Carbonation    No alcohol               No Snacks or No Concentrated Sweets      Exercising    Key Diet Principles             Addressed Current Habits/Changes to Make    Patient was instructed on clear liquid diet to follow for one (1) day prior to surgery.  Patient has been educated on the liquid diet to begin day 2 post-op and continue for 2 weeks post surgery.  Patient understands the transition of the diet and need to remain on full liquid diet until advanced by provider and dietitian. Summary:  Patient has completed the required amount of visits with the Registered Dietitian. During these nutrition visits, we focused on dietary changes, behavior changes, and the importance of establishing an exercise routine. The diet protocol that patient was prescribed emphasized on low carbohydrates (less than 50 grams per day prior to surgery and 60-80 grams of protein per day). At today's session, patient was educated on the post-op diet protocol. Patient understands the importance of keeping total fat and sugar less than 3 grams per serving. Patient is aware of the transition of the diet stages and is aware that they will be on clear liquids for 1 day pre-op, followed by modified full liquid diet for 2 weeks post-op. Patient understands the body needs ~ 60-70 grams of protein per day. During the liquid phase, patient will need a minimum of  60 grams of protein from shakes.   Once eating soft protein, patient will need 40-60 g protein from protein supplement shakes per day to meet goal of  g protein total intake/day. Patient is aware of the importance of the vitamins and protein drinks. We spent a lot of time talking about the vitamins. Patient understands the importance of being compliant with the diet protocol and the complications and risks that can occur if they are non-compliant with the nutritional protocol and non-compliant with the vitamins. Patient has also been educated on the pre-op liquid diet for 1 day. Patient understands that failure to comply in pre-op liquid diet could result in surgery being canceled. Patient's 2 week post-op nutrition virtual appointment has been scheduled. At this 2 week visit, RD will assess how patient is tolerating liquids and recommend to provider advancement of pts diet to soft pureed diet, if tolerating liquid protein diet without difficulty. Patient will also have a virtual appointment with RD again at 1 month post-op to assess tolerance of soft puree diet and advance to soft protein with soft vegetables, if soft pureed diet is tolerated. RD will assess patient's compliance with current protocol, including diet, vitamins, protein shakes, and exercise. Post-op diet guidelines will be reinforced. RD is available for questions and to meet with patient outside of the 2 week and 1 month post-op visit. RD contact information in pre-op binder was reviewed in class.     Doc Kareem, MS, TEAGAN/ROSIE  2/21/2022

## 2022-02-21 NOTE — PROGRESS NOTES
Gastric Bypass - History and Physical    Subjective: The patient is a 58 y.o. obese female with a Body mass index is 38.21 kg/m². .   she presents now to review their work up to date to see if they are a candidate for surgery and whether or not to proceed with the previously requested procedure. Bariatric comorbidities continue to include: The patient underwent a laparoscopic sleeve gastrectomy by me in 2013 and lost down to 170 pounds. She was very happy with her weight loss at that juncture but over the past year or so she has had significant weight regain. In addition she has had some reflux symptoms and we assessed her for a conversion to the gastric bypass procedure. Upper GI study performed did show anatomy amenable to conversion procedure. She now presents for that procedure. Patient Active Problem List   Diagnosis Code    Abnormal EKG R94.31    Hypertension I10    Hypercholesteremia E78.00    Hx of smoking Z87.891    Migraine G43.909    Depression F32. A    Arthritic-like pain M25.50    Intestinal malabsorption K90.9    H. pylori infection A04.8    Chronic gastritis K29.50    Fracture of distal femur (HCC) S72.409A    Osteoarthritis M19.90    Hypokalemia E87.6    Urinary retention R33.9    UTI (lower urinary tract infection) N39.0    S/P laparoscopic sleeve gastrectomy Z98.84    Hypovitaminosis D E55.9    Low vitamin B12 level L24.5    Complication of internal knee prosthesis (HCC) T84. 9XXA, Z96.659    Sickle cell trait ([facility] [address] D57.3       They have been generally well prior to this visit and have had no recent significant illnesses. The patient has had no gastrointestinal issues that would preclude them from proceeding with the surgery they have chosen. Ada Perry has recently tried a preoperative weight loss program  in addition to seeing a bariatric nutritionist preoperatively.  We have discussed on at least one other occasion about the various types of surgical weight loss procedures and they have considered these options after our initial consultation. We have once again discussed these procedures in detail and they have now decided on a surgical procedure. They present today to discuss this and confirm that their evaluation pre operatively is acceptable to continue with surgery. The patient desires laparoscopic gastric bypass surgery for surgical weight loss and heartburn control. The patients goal weight is 163lb. These goals are consistent with expected outcomes of their desired operation. her Medical goals are resolution of these health issues. Since the patient's original consult 6 months ago they have been seen by their PCP for routine medical care.     There has been no change to their overall medical. She had total knee revision right knee 2021 and one steroid injection 2021.             Patient Active Problem List    Diagnosis Date Noted    Sickle cell trait ([address]     Complication of internal knee prosthesis ([address] 2021    Low vitamin B12 level 2021    S/P laparoscopic sleeve gastrectomy 10/08/2019    Hypovitaminosis D 10/08/2019    UTI (lower urinary tract infection) 2015    Osteoarthritis 2015    Hypokalemia 2015    Urinary retention 2015    Fracture of distal femur ([address] 2015    Intestinal malabsorption 2014    H. pylori infection 2014    Chronic gastritis 2014    Hypertension     Hypercholesteremia     Hx of smoking     Migraine     Depression     Arthritic-like pain     Abnormal EKG 2011      Past Surgical History:   Procedure Laterality Date    DELIVERY       HX CHOLECYSTECTOMY      laparoscopic    HX CYST INCISION AND DRAINAGE      HX FEMUR FRACTURE [address]      HX GI  2013    sleeve resection    HX KNEE ARTHROSCOPY      right    HX KNEE REPLACEMENT      bilateral    HX ORTHOPAEDIC  -    left knee revision Social History     Tobacco Use    Smoking status: Former Smoker     Packs/day: 0.25     Years: 35.00     Pack years: 8.75     Types: Cigarettes     Quit date: 1/10/2017     Years since quittin.1    Smokeless tobacco: Never Used    Tobacco comment: states she smokes e-cigarettes   Substance Use Topics    Alcohol use: Yes     Alcohol/week: 0.8 standard drinks     Types: 1 Standard drinks or equivalent per week     Comment: social      Family History   Problem Relation Age of Onset    Heart Disease Mother         Mom was told she had irreg heart beat/hole in heart , [place] Breast Cancer Mother     Other Other         Non- contributory    Breast Cancer Maternal Grandmother     Malignant Hyperthermia Neg Hx     Pseudocholinesterase Deficiency Neg Hx     Delayed Awakening Neg Hx     Post-op Nausea/Vomiting Neg Hx     Emergence Delirium Neg Hx     Post-op Cognitive Dysfunction Neg Hx       Current Outpatient Medications   Medication Sig Dispense Refill    enoxaparin (LOVENOX) 40 mg/0.4 mL 0.4 mL by SubCUTAneous route every twelve (12) hours every twelve (12) hours for 10 days. Indications: deep vein thrombosis prevention 20 Each 0    Klor-Con M20 20 mEq tablet TAKE 1 TABLET BY MOUTH 2 TIMES A DAY.  dicyclomine (BENTYL) 10 mg capsule TAKE 1 CAPSULE BY MOUTH FOUR TIMES A DAY AS NEEDED FOR ABDOMINAL CRAMPS/PAIN      HYDROmorphone (DILAUDID) 2 mg tablet Take 1 Tablet by mouth every six (6) hours as needed for Pain for up to 5 days. Max Daily Amount: 8 mg. 28 Tablet 0    naloxone (Narcan) 4 mg/actuation nasal spray Use 1 spray intranasally, then discard. Repeat with new spray every 2 min as needed for opioid overdose symptoms, alternating nostrils. 1 Each 1    linaCLOtide (Linzess) 145 mcg cap capsule Take  by mouth Daily (before breakfast).  ergocalciferol (Vitamin D2) 1,250 mcg (50,000 unit) capsule Take 1 Capsule by mouth every seven (7) days for 52 doses.  Indications: vitamin D deficiency (high dose therapy) 26 Capsule 1    Aimovig Autoinjector 140 mg/mL injection       multivitamin (ONE A DAY) tablet Take 1 Tablet by mouth daily.  cyanocobalamin (VITAMIN B12) 1,000 mcg/mL injection 1 mL by SubCUTAneous route every thirty (30) days. 30 mL 0    Syringe with Needle, Disp, 3 mL 25 x 5/8\" syrg 1 mL by SubCUTAneous route every thirty (30) days. 15 Syringe 0    carvediloL (Coreg) 25 mg tablet Take 25 mg by mouth two (2) times daily (with meals).  traZODone (DESYREL) 100 mg tablet Take 300 mg by mouth.  buPROPion XL (WELLBUTRIN XL) 150 mg tablet Take 150 mg by mouth every morning.  hydrOXYzine HCl (ATARAX) 25 mg tablet Take  by mouth three (3) times daily as needed for Itching.  vilazodone (VIIBRYD) 40 mg tab tablet Take  by mouth daily.  ondansetron (ZOFRAN ODT) 8 mg disintegrating tablet Take 1 Tablet by mouth every eight (8) hours as needed for Nausea or Vomiting.  (Patient not taking: Reported on 2/21/2022) 60 Tablet 0    Nurtec ODT 75 mg disintegrating tablet  (Patient not taking: Reported on 2/21/2022)       Allergies   Allergen Reactions    Percocet [Oxycodone-Acetaminophen] Itching          Review of Systems:          General - No history or complaints of unexpected fever, chills, or weight loss  Head/Neck - No history or complaints of headache, diplopia, dysphagia, hearing loss  Cardiac - No history or complaints of chest pain, palpitations, murmur, or shortness of breath  Pulmonary - No history or complaints of shortness of breath, productive cough, hemoptysis  Gastrointestinal - no reflux,no  abdominal pain, obstipation/constipation or blood per rectum  Genitourinary - No history or complaints of hematuria/dysuria, stress urinary incontinence symptoms, or renal lithiasis  Musculoskeletal - has joint pain in their knees,  no muscular weakness  Hematologic - No history or complaints of bleeding disorders,  No blood transfusions  Neurologic - No history or complaints of  migraine headaches, seizure activity, syncopal episodes, TIA or stroke  Integumentary - No history or complaints of rashes, abnormal nevi, skin cancer  Gynecological - No abnormal bleeding              Objective:     Visit Vitals  BP (!) 131/90   Pulse 96   Temp 98.2 °F (36.8 °C)   Ht 5' 4\" (1.626 m)   Wt 101 kg (222 lb 9.6 oz)   SpO2 98%   BMI 38.21 kg/m²       Physical Examination: General appearance - alert, well appearing, and in no distress and oriented to person, place, and time  Mental status - alert, oriented to person, place, and time, normal mood, behavior, speech, dress, motor activity, and thought processes  Eyes - pupils equal and reactive, extraocular eye movements intact, sclera anicteric, left eye normal, right eye normal  Ears - right ear normal, left ear normal  Nose - normal and patent, no erythema, discharge or polyps  Mouth - mucous membranes moist, pharynx normal without lesions  Neck - supple, no significant adenopathy  Lymphatics - no palpable lymphadenopathy, no hepatosplenomegaly  Chest - clear to auscultation, no wheezes, rales or rhonchi, symmetric air entry  Heart - normal rate, regular rhythm, normal S1, S2, no murmurs, rubs, clicks or gallops  Abdomen - soft, nontender, nondistended, no masses or organomegaly  Back exam - full range of motion, no tenderness, palpable spasm or pain on motion  Neurological - alert, oriented, normal speech, no focal findings or movement disorder noted  Musculoskeletal - no joint tenderness, deformity or swelling  Extremities - peripheral pulses normal, no pedal edema, no clubbing or cyanosis  Skin - normal coloration and turgor, no rashes, no suspicious skin lesions noted    Labs :            Cardiac / Pulmonary Evaluation:     NA    UGI Results:        Procedure -the patient was brought to the fluoroscopy suite where they were given thin barium.   On swallowing the barium the patient was noted to have normal peristalsis of their esophagus with progressive flow into the distal esophagus. Specific findings of the distal esophagus revealed that they did not have a hiatal hernia. Contrast then flowed into the gastric cardia with the following findings: Contrast flowed normally through the esophagus and into a normal caliber sleeve without obstruction or reflux or kinking of any kind. Contrast continued to flow through the sleeve and into the small intestine without issue. The plan going forward will be to pursue a sleeve to bypass conversion in an effort to continue weight loss.       Assessment:     Morbid obesity with associated comorbidity    Plan:     laparoscopic gastric bypass surgery is a conversion from the sleeve gastrectomy. This is a 58 y.o. female with a BMI of Body mass index is 38.21 kg/m². and the weight-related co-morbidties as noted above. Yamilet Farrar meets the NIH criteria for bariatric surgery based upon the BMI of Body mass index is 38.21 kg/m². and   multiple weight-related co-morbidties. Yamilet Farrar has elected laparoscopic gastric bypass as her intervention of choice for treatment of morbid obestiy through surgical means secondary to its   uniform results,  profound baseline suppression of hunger and pace at which weight is lost.    In the office today, following Lilia's history and physical examination, a 40 minute discussion regarding the anatomic alterations for the laparoscopic gastric bypass  was undertaken. The dietary   expectations and the patient  dependent factors for success were thoroughly discussed, to include the need for interval follow-up and long-term dietary changes associated with success. The possible short   and long term  complications of the gastric bypass were also discussed, to include but not limited to;death, DVT/PE, staple line leak, bleeding, stricture formation, infection,internal hernia  and pouch dilation.     Specific weight related outcomes for success were also discussed with an emphasis on careful and close follow-up with the first year and Dietary behavior modification over the first years as baseline cyclical   hunger returns  The patient expressed an understanding of the above factors, and her questions were answered in their entirety. In addition, the patient attended a 1.5 hour power point seminar or online seminar regarding obesity, surgical weight loss including, adjustable gastric band, gastric bypass, and sleeve gastrectomy. This discussion contrasted   the different surgical techniques, mechanisms of actions and expected outcomes, and surgical and medical risks associated with each procedure. During the in office seminar, there was a long question and answer   session where each questions was answered until there were no additional questions. Today, the patient had all of her questions answered and the decision was made today that the patient's preoperative evaluation is acceptable for them proceed with gastric bypass procedure as a conversion from the sleeve gastrectomy. Patient does understand that the risk of a secondary procedure slightly higher than a primary procedure and she is comfortable with that risk. Secondary Diagnoses:     DVT / Pulmonary Embolus Risk - The patient is at a higher risk for post operative DVT / pulmonary embolus secondary to their morbid obese status, relative sedentary lifestyle, and impending general anesthetic. We will plan to use anticoagulation therapy pre and post operative as well as compression leg devices and encourage ambulation once on the hospital nursing floor. The need for possible at home anticoagulation therapy has also been discussed and any decision on this matter will be made during post operative evaluations. Signs and symptoms of DVT / PE have been discussed with the patient and they have been instructed to call the office if any these occur in the \"at home\" post op phase.      Hypertension - The patient has a clear understanding of how weight loss improves hypertension as a whole, but also they understand that there is a significant genetic component to this disease process. We will monitor the patients blood pressure while in the hospital and the plan would be to continue those medications postoperatively.  If a diuretic is being used we will stop them on discharge to prevent dehydration particularly with the sleeve gastrectomy and the gastric bypass procedures.  They will be instructed to monitor their blood pressure postoperatively while at home and notify their primary care physician in the event of any significantly high or uncharacteristic readings. Weight Related Arthritis -The patient understands the benefits that weight loss surgery can have on their arthritis but also understands that weight loss is not a guaranteed cure and relief of symptoms is often dependent on the severity of the underlying disease. The patient also understands that traditional pharmaceutical treatments for this diagnosis are usually unavailable to post-operative weight loss patients due to the effects on the gastrointestinal tract. Any changes to the patients medication treatment will ultimately be made the patients PCP with input by our office.     Signed By: Issac Mccain MD     February 21, 2022

## 2022-02-21 NOTE — H&P (VIEW-ONLY)
Gastric Bypass - History and Physical    Subjective: The patient is a 58 y.o. obese female with a Body mass index is 38.21 kg/m². .   she presents now to review their work up to date to see if they are a candidate for surgery and whether or not to proceed with the previously requested procedure. Bariatric comorbidities continue to include: The patient underwent a laparoscopic sleeve gastrectomy by me in 2013 and lost down to 170 pounds. She was very happy with her weight loss at that juncture but over the past year or so she has had significant weight regain. In addition she has had some reflux symptoms and we assessed her for a conversion to the gastric bypass procedure. Upper GI study performed did show anatomy amenable to conversion procedure. She now presents for that procedure. Patient Active Problem List   Diagnosis Code    Abnormal EKG R94.31    Hypertension I10    Hypercholesteremia E78.00    Hx of smoking Z87.891    Migraine G43.909    Depression F32. A    Arthritic-like pain M25.50    Intestinal malabsorption K90.9    H. pylori infection A04.8    Chronic gastritis K29.50    Fracture of distal femur (HCC) S72.409A    Osteoarthritis M19.90    Hypokalemia E87.6    Urinary retention R33.9    UTI (lower urinary tract infection) N39.0    S/P laparoscopic sleeve gastrectomy Z98.84    Hypovitaminosis D E55.9    Low vitamin B12 level W95.0    Complication of internal knee prosthesis (HCC) T84. 9XXA, Z96.659    Sickle cell trait ([facility] D57.3       They have been generally well prior to this visit and have had no recent significant illnesses. The patient has had no gastrointestinal issues that would preclude them from proceeding with the surgery they have chosen. Vicenta Mensah has recently tried a preoperative weight loss program  in addition to seeing a bariatric nutritionist preoperatively.  We have discussed on at least one other occasion about the various types of surgical weight loss procedures and they have considered these options after our initial consultation. We have once again discussed these procedures in detail and they have now decided on a surgical procedure. They present today to discuss this and confirm that their evaluation pre operatively is acceptable to continue with surgery. The patient desires laparoscopic gastric bypass surgery for surgical weight loss and heartburn control. The patients goal weight is 163lb. These goals are consistent with expected outcomes of their desired operation. her Medical goals are resolution of these health issues. Since the patient's original consult 6 months ago they have been seen by their PCP for routine medical care.     There has been no change to their overall medical. She had total knee revision right knee 2021 and one steroid injection 2021.             Patient Active Problem List    Diagnosis Date Noted    Sickle cell trait ([address]     Complication of internal knee prosthesis ([address] 2021    Low vitamin B12 level 2021    S/P laparoscopic sleeve gastrectomy 10/08/2019    Hypovitaminosis D 10/08/2019    UTI (lower urinary tract infection) 2015    Osteoarthritis 2015    Hypokalemia 2015    Urinary retention 2015    Fracture of distal femur ([address] 2015    Intestinal malabsorption 2014    H. pylori infection 2014    Chronic gastritis 2014    Hypertension     Hypercholesteremia     Hx of smoking     Migraine     Depression     Arthritic-like pain     Abnormal EKG 2011      Past Surgical History:   Procedure Laterality Date    DELIVERY       HX CHOLECYSTECTOMY      laparoscopic    HX CYST INCISION AND DRAINAGE      HX FEMUR FRACTURE [address]      HX GI  2013    sleeve resection    HX KNEE ARTHROSCOPY      right    HX KNEE REPLACEMENT      bilateral    HX ORTHOPAEDIC  -    left knee revision Social History     Tobacco Use    Smoking status: Former Smoker     Packs/day: 0.25     Years: 35.00     Pack years: 8.75     Types: Cigarettes     Quit date: 1/10/2017     Years since quittin.1    Smokeless tobacco: Never Used    Tobacco comment: states she smokes e-cigarettes   Substance Use Topics    Alcohol use: Yes     Alcohol/week: 0.8 standard drinks     Types: 1 Standard drinks or equivalent per week     Comment: social      Family History   Problem Relation Age of Onset    Heart Disease Mother         Mom was told she had irreg heart beat/hole in heart , [place] Breast Cancer Mother     Other Other         Non- contributory    Breast Cancer Maternal Grandmother     Malignant Hyperthermia Neg Hx     Pseudocholinesterase Deficiency Neg Hx     Delayed Awakening Neg Hx     Post-op Nausea/Vomiting Neg Hx     Emergence Delirium Neg Hx     Post-op Cognitive Dysfunction Neg Hx       Current Outpatient Medications   Medication Sig Dispense Refill    enoxaparin (LOVENOX) 40 mg/0.4 mL 0.4 mL by SubCUTAneous route every twelve (12) hours every twelve (12) hours for 10 days. Indications: deep vein thrombosis prevention 20 Each 0    Klor-Con M20 20 mEq tablet TAKE 1 TABLET BY MOUTH 2 TIMES A DAY.  dicyclomine (BENTYL) 10 mg capsule TAKE 1 CAPSULE BY MOUTH FOUR TIMES A DAY AS NEEDED FOR ABDOMINAL CRAMPS/PAIN      HYDROmorphone (DILAUDID) 2 mg tablet Take 1 Tablet by mouth every six (6) hours as needed for Pain for up to 5 days. Max Daily Amount: 8 mg. 28 Tablet 0    naloxone (Narcan) 4 mg/actuation nasal spray Use 1 spray intranasally, then discard. Repeat with new spray every 2 min as needed for opioid overdose symptoms, alternating nostrils. 1 Each 1    linaCLOtide (Linzess) 145 mcg cap capsule Take  by mouth Daily (before breakfast).  ergocalciferol (Vitamin D2) 1,250 mcg (50,000 unit) capsule Take 1 Capsule by mouth every seven (7) days for 52 doses.  Indications: vitamin D deficiency (high dose therapy) 26 Capsule 1    Aimovig Autoinjector 140 mg/mL injection       multivitamin (ONE A DAY) tablet Take 1 Tablet by mouth daily.  cyanocobalamin (VITAMIN B12) 1,000 mcg/mL injection 1 mL by SubCUTAneous route every thirty (30) days. 30 mL 0    Syringe with Needle, Disp, 3 mL 25 x 5/8\" syrg 1 mL by SubCUTAneous route every thirty (30) days. 15 Syringe 0    carvediloL (Coreg) 25 mg tablet Take 25 mg by mouth two (2) times daily (with meals).  traZODone (DESYREL) 100 mg tablet Take 300 mg by mouth.  buPROPion XL (WELLBUTRIN XL) 150 mg tablet Take 150 mg by mouth every morning.  hydrOXYzine HCl (ATARAX) 25 mg tablet Take  by mouth three (3) times daily as needed for Itching.  vilazodone (VIIBRYD) 40 mg tab tablet Take  by mouth daily.  ondansetron (ZOFRAN ODT) 8 mg disintegrating tablet Take 1 Tablet by mouth every eight (8) hours as needed for Nausea or Vomiting.  (Patient not taking: Reported on 2/21/2022) 60 Tablet 0    Nurtec ODT 75 mg disintegrating tablet  (Patient not taking: Reported on 2/21/2022)       Allergies   Allergen Reactions    Percocet [Oxycodone-Acetaminophen] Itching          Review of Systems:          General - No history or complaints of unexpected fever, chills, or weight loss  Head/Neck - No history or complaints of headache, diplopia, dysphagia, hearing loss  Cardiac - No history or complaints of chest pain, palpitations, murmur, or shortness of breath  Pulmonary - No history or complaints of shortness of breath, productive cough, hemoptysis  Gastrointestinal - no reflux,no  abdominal pain, obstipation/constipation or blood per rectum  Genitourinary - No history or complaints of hematuria/dysuria, stress urinary incontinence symptoms, or renal lithiasis  Musculoskeletal - has joint pain in their knees,  no muscular weakness  Hematologic - No history or complaints of bleeding disorders,  No blood transfusions  Neurologic - No history or complaints of  migraine headaches, seizure activity, syncopal episodes, TIA or stroke  Integumentary - No history or complaints of rashes, abnormal nevi, skin cancer  Gynecological - No abnormal bleeding              Objective:     Visit Vitals  BP (!) 131/90   Pulse 96   Temp 98.2 °F (36.8 °C)   Ht 5' 4\" (1.626 m)   Wt 101 kg (222 lb 9.6 oz)   SpO2 98%   BMI 38.21 kg/m²       Physical Examination: General appearance - alert, well appearing, and in no distress and oriented to person, place, and time  Mental status - alert, oriented to person, place, and time, normal mood, behavior, speech, dress, motor activity, and thought processes  Eyes - pupils equal and reactive, extraocular eye movements intact, sclera anicteric, left eye normal, right eye normal  Ears - right ear normal, left ear normal  Nose - normal and patent, no erythema, discharge or polyps  Mouth - mucous membranes moist, pharynx normal without lesions  Neck - supple, no significant adenopathy  Lymphatics - no palpable lymphadenopathy, no hepatosplenomegaly  Chest - clear to auscultation, no wheezes, rales or rhonchi, symmetric air entry  Heart - normal rate, regular rhythm, normal S1, S2, no murmurs, rubs, clicks or gallops  Abdomen - soft, nontender, nondistended, no masses or organomegaly  Back exam - full range of motion, no tenderness, palpable spasm or pain on motion  Neurological - alert, oriented, normal speech, no focal findings or movement disorder noted  Musculoskeletal - no joint tenderness, deformity or swelling  Extremities - peripheral pulses normal, no pedal edema, no clubbing or cyanosis  Skin - normal coloration and turgor, no rashes, no suspicious skin lesions noted    Labs :            Cardiac / Pulmonary Evaluation:     NA    UGI Results:        Procedure -the patient was brought to the fluoroscopy suite where they were given thin barium.   On swallowing the barium the patient was noted to have normal peristalsis of their esophagus with progressive flow into the distal esophagus. Specific findings of the distal esophagus revealed that they did not have a hiatal hernia. Contrast then flowed into the gastric cardia with the following findings: Contrast flowed normally through the esophagus and into a normal caliber sleeve without obstruction or reflux or kinking of any kind. Contrast continued to flow through the sleeve and into the small intestine without issue. The plan going forward will be to pursue a sleeve to bypass conversion in an effort to continue weight loss.       Assessment:     Morbid obesity with associated comorbidity    Plan:     laparoscopic gastric bypass surgery is a conversion from the sleeve gastrectomy. This is a 58 y.o. female with a BMI of Body mass index is 38.21 kg/m². and the weight-related co-morbidties as noted above. Manfred Goff meets the NIH criteria for bariatric surgery based upon the BMI of Body mass index is 38.21 kg/m². and   multiple weight-related co-morbidties. Manfred Goff has elected laparoscopic gastric bypass as her intervention of choice for treatment of morbid obestiy through surgical means secondary to its   uniform results,  profound baseline suppression of hunger and pace at which weight is lost.    In the office today, following Lilia's history and physical examination, a 40 minute discussion regarding the anatomic alterations for the laparoscopic gastric bypass  was undertaken. The dietary   expectations and the patient  dependent factors for success were thoroughly discussed, to include the need for interval follow-up and long-term dietary changes associated with success. The possible short   and long term  complications of the gastric bypass were also discussed, to include but not limited to;death, DVT/PE, staple line leak, bleeding, stricture formation, infection,internal hernia  and pouch dilation.     Specific weight related outcomes for success were also discussed with an emphasis on careful and close follow-up with the first year and Dietary behavior modification over the first years as baseline cyclical   hunger returns  The patient expressed an understanding of the above factors, and her questions were answered in their entirety. In addition, the patient attended a 1.5 hour power point seminar or online seminar regarding obesity, surgical weight loss including, adjustable gastric band, gastric bypass, and sleeve gastrectomy. This discussion contrasted   the different surgical techniques, mechanisms of actions and expected outcomes, and surgical and medical risks associated with each procedure. During the in office seminar, there was a long question and answer   session where each questions was answered until there were no additional questions. Today, the patient had all of her questions answered and the decision was made today that the patient's preoperative evaluation is acceptable for them proceed with gastric bypass procedure as a conversion from the sleeve gastrectomy. Patient does understand that the risk of a secondary procedure slightly higher than a primary procedure and she is comfortable with that risk. Secondary Diagnoses:     DVT / Pulmonary Embolus Risk - The patient is at a higher risk for post operative DVT / pulmonary embolus secondary to their morbid obese status, relative sedentary lifestyle, and impending general anesthetic. We will plan to use anticoagulation therapy pre and post operative as well as compression leg devices and encourage ambulation once on the hospital nursing floor. The need for possible at home anticoagulation therapy has also been discussed and any decision on this matter will be made during post operative evaluations. Signs and symptoms of DVT / PE have been discussed with the patient and they have been instructed to call the office if any these occur in the \"at home\" post op phase.      Hypertension - The patient has a clear understanding of how weight loss improves hypertension as a whole, but also they understand that there is a significant genetic component to this disease process. We will monitor the patients blood pressure while in the hospital and the plan would be to continue those medications postoperatively.  If a diuretic is being used we will stop them on discharge to prevent dehydration particularly with the sleeve gastrectomy and the gastric bypass procedures.  They will be instructed to monitor their blood pressure postoperatively while at home and notify their primary care physician in the event of any significantly high or uncharacteristic readings. Weight Related Arthritis -The patient understands the benefits that weight loss surgery can have on their arthritis but also understands that weight loss is not a guaranteed cure and relief of symptoms is often dependent on the severity of the underlying disease. The patient also understands that traditional pharmaceutical treatments for this diagnosis are usually unavailable to post-operative weight loss patients due to the effects on the gastrointestinal tract. Any changes to the patients medication treatment will ultimately be made the patients PCP with input by our office.     Signed By: Meera Goldberg MD     February 21, 2022

## 2022-02-22 ENCOUNTER — TELEPHONE (OUTPATIENT)
Dept: SURGERY | Age: 62
End: 2022-02-22

## 2022-02-22 ENCOUNTER — HOSPITAL ENCOUNTER (OUTPATIENT)
Dept: PREADMISSION TESTING | Age: 62
Discharge: HOME | End: 2022-02-22

## 2022-02-22 VITALS — HEIGHT: 64 IN | BODY MASS INDEX: 37.9 KG/M2 | WEIGHT: 222 LBS

## 2022-02-22 LAB
ALBUMIN SERPL-MCNC: 4.3 G/DL (ref 3.8–4.8)
ALBUMIN/GLOB SERPL: 1.3 {RATIO} (ref 1.2–2.2)
ALP SERPL-CCNC: 108 IU/L (ref 44–121)
ALT SERPL-CCNC: 57 IU/L (ref 0–32)
AST SERPL-CCNC: 27 IU/L (ref 0–40)
ATRIAL RATE: 105 BPM
BASOPHILS # BLD AUTO: 0.1 X10E3/UL (ref 0–0.2)
BASOPHILS NFR BLD AUTO: 1 %
BILIRUB SERPL-MCNC: <0.2 MG/DL (ref 0–1.2)
BUN SERPL-MCNC: 10 MG/DL (ref 8–27)
BUN/CREAT SERPL: 16 (ref 12–28)
CALCIUM SERPL-MCNC: 9.3 MG/DL (ref 8.7–10.3)
CALCULATED P AXIS, ECG09: 75 DEGREES
CALCULATED R AXIS, ECG10: 60 DEGREES
CALCULATED T AXIS, ECG11: 70 DEGREES
CHLORIDE SERPL-SCNC: 104 MMOL/L (ref 96–106)
CO2 SERPL-SCNC: 22 MMOL/L (ref 20–29)
CREAT SERPL-MCNC: 0.63 MG/DL (ref 0.57–1)
DIAGNOSIS, 93000: NORMAL
EOSINOPHIL # BLD AUTO: 0.2 X10E3/UL (ref 0–0.4)
EOSINOPHIL NFR BLD AUTO: 3 %
ERYTHROCYTE [DISTWIDTH] IN BLOOD BY AUTOMATED COUNT: 14.5 % (ref 11.7–15.4)
GLOBULIN SER CALC-MCNC: 3.2 G/DL (ref 1.5–4.5)
GLUCOSE SERPL-MCNC: 98 MG/DL (ref 65–99)
HCG INTACT+B SERPL-ACNC: <1 MIU/ML
HCT VFR BLD AUTO: 40.5 % (ref 34–46.6)
HGB BLD-MCNC: 12.9 G/DL (ref 11.1–15.9)
IMM GRANULOCYTES # BLD AUTO: 0 X10E3/UL (ref 0–0.1)
IMM GRANULOCYTES NFR BLD AUTO: 0 %
LYMPHOCYTES # BLD AUTO: 3.2 X10E3/UL (ref 0.7–3.1)
LYMPHOCYTES NFR BLD AUTO: 55 %
MCH RBC QN AUTO: 26 PG (ref 26.6–33)
MCHC RBC AUTO-ENTMCNC: 31.9 G/DL (ref 31.5–35.7)
MCV RBC AUTO: 82 FL (ref 79–97)
MONOCYTES # BLD AUTO: 0.6 X10E3/UL (ref 0.1–0.9)
MONOCYTES NFR BLD AUTO: 10 %
NEUTROPHILS # BLD AUTO: 1.8 X10E3/UL (ref 1.4–7)
NEUTROPHILS NFR BLD AUTO: 31 %
P-R INTERVAL, ECG05: 146 MS
PLATELET # BLD AUTO: 326 X10E3/UL (ref 150–450)
POTASSIUM SERPL-SCNC: 5 MMOL/L (ref 3.5–5.2)
PROT SERPL-MCNC: 7.5 G/DL (ref 6–8.5)
Q-T INTERVAL, ECG07: 356 MS
QRS DURATION, ECG06: 66 MS
QTC CALCULATION (BEZET), ECG08: 470 MS
RBC # BLD AUTO: 4.97 X10E6/UL (ref 3.77–5.28)
SODIUM SERPL-SCNC: 141 MMOL/L (ref 134–144)
SPECIMEN STATUS REPORT, ROLRST: NORMAL
VENTRICULAR RATE, ECG03: 105 BPM
WBC # BLD AUTO: 5.8 X10E3/UL (ref 3.4–10.8)

## 2022-02-22 RX ORDER — SODIUM CHLORIDE, SODIUM LACTATE, POTASSIUM CHLORIDE, CALCIUM CHLORIDE 600; 310; 30; 20 MG/100ML; MG/100ML; MG/100ML; MG/100ML
125 INJECTION, SOLUTION INTRAVENOUS CONTINUOUS
Status: CANCELLED | OUTPATIENT
Start: 2022-02-22

## 2022-02-22 RX ORDER — CELECOXIB 200 MG/1
200 CAPSULE ORAL 2 TIMES DAILY
Status: ON HOLD | COMMUNITY
End: 2022-03-01

## 2022-02-22 RX ORDER — NYSTATIN 100000 [USP'U]/ML
500000 SUSPENSION ORAL
Status: CANCELLED | OUTPATIENT
Start: 2022-02-22 | End: 2022-02-22

## 2022-02-22 RX ORDER — CEFAZOLIN SODIUM/WATER 2 G/20 ML
2 SYRINGE (ML) INTRAVENOUS ONCE
Status: CANCELLED | OUTPATIENT
Start: 2022-02-22 | End: 2022-02-22

## 2022-02-22 RX ORDER — CYCLOBENZAPRINE HCL 5 MG
5 TABLET ORAL
Status: ON HOLD | COMMUNITY
End: 2022-03-01

## 2022-02-22 RX ORDER — ACETAMINOPHEN 500 MG
1000 TABLET ORAL ONCE
Status: CANCELLED | OUTPATIENT
Start: 2022-02-22 | End: 2022-02-22

## 2022-02-22 RX ORDER — ENOXAPARIN SODIUM 100 MG/ML
40 INJECTION SUBCUTANEOUS
Status: CANCELLED | OUTPATIENT
Start: 2022-02-22 | End: 2022-02-23

## 2022-02-22 RX ORDER — FAMOTIDINE 20 MG/50ML
20 INJECTION, SOLUTION INTRAVENOUS
Status: CANCELLED | OUTPATIENT
Start: 2022-02-22 | End: 2022-02-23

## 2022-02-23 ENCOUNTER — HOSPITAL ENCOUNTER (OUTPATIENT)
Dept: PREADMISSION TESTING | Age: 62
Discharge: HOME | End: 2022-02-23
Payer: MEDICARE

## 2022-02-23 PROCEDURE — U0003 INFECTIOUS AGENT DETECTION BY NUCLEIC ACID (DNA OR RNA); SEVERE ACUTE RESPIRATORY SYNDROME CORONAVIRUS 2 (SARS-COV-2) (CORONAVIRUS DISEASE [COVID-19]), AMPLIFIED PROBE TECHNIQUE, MAKING USE OF HIGH THROUGHPUT TECHNOLOGIES AS DESCRIBED BY CMS-2020-01-R: HCPCS

## 2022-02-24 LAB — SARS-COV-2, NAA: NOT DETECTED

## 2022-02-28 ENCOUNTER — ANESTHESIA EVENT (OUTPATIENT)
Dept: SURGERY | Age: 62
DRG: 403 | End: 2022-02-28
Payer: MEDICAID

## 2022-03-01 ENCOUNTER — ANESTHESIA (OUTPATIENT)
Dept: SURGERY | Age: 62
DRG: 403 | End: 2022-03-01
Payer: MEDICAID

## 2022-03-01 ENCOUNTER — HOSPITAL ENCOUNTER (INPATIENT)
Age: 62
LOS: 1 days | Discharge: HOME | DRG: 403 | End: 2022-03-02
Attending: SPECIALIST | Admitting: SPECIALIST
Payer: MEDICAID

## 2022-03-01 ENCOUNTER — APPOINTMENT (OUTPATIENT)
Dept: SURGERY | Age: 62
End: 2022-03-01

## 2022-03-01 DIAGNOSIS — E66.01 SEVERE OBESITY (BMI 35.0-39.9) WITH COMORBIDITY (HCC): ICD-10-CM

## 2022-03-01 DIAGNOSIS — K66.0 PERITONEAL ADHESION: ICD-10-CM

## 2022-03-01 DIAGNOSIS — K76.0 NAFLD (NONALCOHOLIC FATTY LIVER DISEASE): ICD-10-CM

## 2022-03-01 DIAGNOSIS — K55.029 ISCHEMIC NECROSIS OF SMALL BOWEL (HCC): ICD-10-CM

## 2022-03-01 DIAGNOSIS — K21.9 GASTROESOPHAGEAL REFLUX DISEASE WITHOUT ESOPHAGITIS: ICD-10-CM

## 2022-03-01 DIAGNOSIS — K31.89 DILATION OF STOMACH: ICD-10-CM

## 2022-03-01 PROCEDURE — 77030002896 HC SUT CLP ABSRB J&J -B: Performed by: SPECIALIST

## 2022-03-01 PROCEDURE — 88305 TISSUE EXAM BY PATHOLOGIST: CPT

## 2022-03-01 PROCEDURE — 74011250636 HC RX REV CODE- 250/636: Performed by: SPECIALIST

## 2022-03-01 PROCEDURE — 77030006643: Performed by: ANESTHESIOLOGY

## 2022-03-01 PROCEDURE — 0FB04ZX EXCISION OF LIVER, PERCUTANEOUS ENDOSCOPIC APPROACH, DIAGNOSTIC: ICD-10-PCS | Performed by: SPECIALIST

## 2022-03-01 PROCEDURE — 43644 LAP GASTRIC BYPASS/ROUX-EN-Y: CPT | Performed by: SPECIALIST

## 2022-03-01 PROCEDURE — 74011250637 HC RX REV CODE- 250/637: Performed by: SPECIALIST

## 2022-03-01 PROCEDURE — 76060000036 HC ANESTHESIA 2.5 TO 3 HR: Performed by: SPECIALIST

## 2022-03-01 PROCEDURE — 77030040506 HC DRN WND MDII -A: Performed by: SPECIALIST

## 2022-03-01 PROCEDURE — 2709999900 HC NON-CHARGEABLE SUPPLY: Performed by: SPECIALIST

## 2022-03-01 PROCEDURE — 77030005513 HC CATH URETH FOL11 MDII -B: Performed by: SPECIALIST

## 2022-03-01 PROCEDURE — 77030008518 HC TBNG INSUF ENDO STRY -B: Performed by: SPECIALIST

## 2022-03-01 PROCEDURE — 88313 SPECIAL STAINS GROUP 2: CPT

## 2022-03-01 PROCEDURE — 74011000250 HC RX REV CODE- 250: Performed by: ANESTHESIOLOGY

## 2022-03-01 PROCEDURE — 77030036732 HC RELD STPLR VASC J&J -F: Performed by: SPECIALIST

## 2022-03-01 PROCEDURE — 88307 TISSUE EXAM BY PATHOLOGIST: CPT

## 2022-03-01 PROCEDURE — 77030016151 HC PROTCTR LNS DFOG COVD -B: Performed by: SPECIALIST

## 2022-03-01 PROCEDURE — 77030041460 HC APL VISTASEAL J&J -B: Performed by: SPECIALIST

## 2022-03-01 PROCEDURE — 74011250636 HC RX REV CODE- 250/636: Performed by: ANESTHESIOLOGY

## 2022-03-01 PROCEDURE — 77030020782 HC GWN BAIR PAWS FLX 3M -B: Performed by: SPECIALIST

## 2022-03-01 PROCEDURE — 0DN64ZZ RELEASE STOMACH, PERCUTANEOUS ENDOSCOPIC APPROACH: ICD-10-PCS | Performed by: SPECIALIST

## 2022-03-01 PROCEDURE — 77030009426 HC FCPS BIOP ENDOSC BSC -B: Performed by: SPECIALIST

## 2022-03-01 PROCEDURE — 77030009851 HC PCH RTVR ENDOSC AMR -B: Performed by: SPECIALIST

## 2022-03-01 PROCEDURE — 43659 UNLISTED LAPS PX STOMACH: CPT | Performed by: SPECIALIST

## 2022-03-01 PROCEDURE — 77030014008 HC SPNG HEMSTAT J&J -C: Performed by: SPECIALIST

## 2022-03-01 PROCEDURE — 77030040361 HC SLV COMPR DVT MDII -B: Performed by: SPECIALIST

## 2022-03-01 PROCEDURE — 77030034154 HC SHR COAG HARM ACE J&J -F: Performed by: SPECIALIST

## 2022-03-01 PROCEDURE — 65270000029 HC RM PRIVATE

## 2022-03-01 PROCEDURE — 77030027138 HC INCENT SPIROMETER -A: Performed by: SPECIALIST

## 2022-03-01 PROCEDURE — 77030027876 HC STPLR ENDOSC FLX PWR J&J -G1: Performed by: SPECIALIST

## 2022-03-01 PROCEDURE — 44202 LAP ENTERECTOMY: CPT | Performed by: SPECIALIST

## 2022-03-01 PROCEDURE — 77030011810 HC STPLR ENDOSC J&J -G: Performed by: SPECIALIST

## 2022-03-01 PROCEDURE — 77030009967 HC RELD STPLR ENDOSC J&J -C: Performed by: SPECIALIST

## 2022-03-01 PROCEDURE — 77030039961 HC KT CUST COLON BSC -D: Performed by: SPECIALIST

## 2022-03-01 PROCEDURE — 74011000250 HC RX REV CODE- 250: Performed by: SPECIALIST

## 2022-03-01 PROCEDURE — 77030002912 HC SUT ETHBND J&J -A: Performed by: SPECIALIST

## 2022-03-01 PROCEDURE — 77030002933 HC SUT MCRYL J&J -A: Performed by: SPECIALIST

## 2022-03-01 PROCEDURE — 47379 UNLISTED LAPS PX LIVER: CPT | Performed by: SPECIALIST

## 2022-03-01 PROCEDURE — 77030008603 HC TRCR ENDOSC EPATH J&J -C: Performed by: SPECIALIST

## 2022-03-01 PROCEDURE — 77030003580 HC NDL INSUF VERES J&J -B: Performed by: SPECIALIST

## 2022-03-01 PROCEDURE — 77030010515 HC APPL ENDOCLP LIG J&J -B: Performed by: SPECIALIST

## 2022-03-01 PROCEDURE — 0DT80ZZ RESECTION OF SMALL INTESTINE, OPEN APPROACH: ICD-10-PCS | Performed by: SPECIALIST

## 2022-03-01 PROCEDURE — 0DB60ZZ EXCISION OF STOMACH, OPEN APPROACH: ICD-10-PCS | Performed by: SPECIALIST

## 2022-03-01 PROCEDURE — 76210000016 HC OR PH I REC 1 TO 1.5 HR: Performed by: SPECIALIST

## 2022-03-01 PROCEDURE — 77030008602 HC TRCR ENDOSC EPATH J&J -B: Performed by: SPECIALIST

## 2022-03-01 PROCEDURE — 77030010030: Performed by: SPECIALIST

## 2022-03-01 PROCEDURE — 77030009968 HC RELD STPLR ENDOSC J&J -D: Performed by: SPECIALIST

## 2022-03-01 PROCEDURE — 77030041458 HC SEALNT FIBRN VISTASEAL J&J -G: Performed by: SPECIALIST

## 2022-03-01 PROCEDURE — 77030012893: Performed by: SPECIALIST

## 2022-03-01 PROCEDURE — 0D164ZA BYPASS STOMACH TO JEJUNUM, PERCUTANEOUS ENDOSCOPIC APPROACH: ICD-10-PCS | Performed by: SPECIALIST

## 2022-03-01 PROCEDURE — 77030002966 HC SUT PDS J&J -A: Performed by: SPECIALIST

## 2022-03-01 PROCEDURE — 77030008683 HC TU ET CUF COVD -A: Performed by: ANESTHESIOLOGY

## 2022-03-01 PROCEDURE — 43239 EGD BIOPSY SINGLE/MULTIPLE: CPT | Performed by: SPECIALIST

## 2022-03-01 PROCEDURE — 77030002916 HC SUT ETHLN J&J -A: Performed by: SPECIALIST

## 2022-03-01 PROCEDURE — 44125 REMOVAL OF SMALL INTESTINE: CPT | Performed by: SPECIALIST

## 2022-03-01 PROCEDURE — 77030002986 HC SUT PROL J&J -A: Performed by: SPECIALIST

## 2022-03-01 PROCEDURE — 77030008574 HC TBNG SUC IRR STRY -B: Performed by: SPECIALIST

## 2022-03-01 PROCEDURE — 77030008477 HC STYL SATN SLP COVD -A: Performed by: ANESTHESIOLOGY

## 2022-03-01 PROCEDURE — 88342 IMHCHEM/IMCYTCHM 1ST ANTB: CPT

## 2022-03-01 PROCEDURE — 76010000132 HC OR TIME 2.5 TO 3 HR: Performed by: SPECIALIST

## 2022-03-01 RX ORDER — HYDROMORPHONE HYDROCHLORIDE 1 MG/ML
INJECTION, SOLUTION INTRAMUSCULAR; INTRAVENOUS; SUBCUTANEOUS AS NEEDED
Status: DISCONTINUED | OUTPATIENT
Start: 2022-03-01 | End: 2022-03-01 | Stop reason: HOSPADM

## 2022-03-01 RX ORDER — ONDANSETRON 2 MG/ML
INJECTION INTRAMUSCULAR; INTRAVENOUS AS NEEDED
Status: DISCONTINUED | OUTPATIENT
Start: 2022-03-01 | End: 2022-03-01 | Stop reason: HOSPADM

## 2022-03-01 RX ORDER — MORPHINE SULFATE 4 MG/ML
6 INJECTION INTRAVENOUS
Status: DISCONTINUED | OUTPATIENT
Start: 2022-03-01 | End: 2022-03-02

## 2022-03-01 RX ORDER — LIDOCAINE HYDROCHLORIDE 20 MG/ML
INJECTION, SOLUTION EPIDURAL; INFILTRATION; INTRACAUDAL; PERINEURAL AS NEEDED
Status: DISCONTINUED | OUTPATIENT
Start: 2022-03-01 | End: 2022-03-01 | Stop reason: HOSPADM

## 2022-03-01 RX ORDER — ROCURONIUM BROMIDE 10 MG/ML
INJECTION, SOLUTION INTRAVENOUS AS NEEDED
Status: DISCONTINUED | OUTPATIENT
Start: 2022-03-01 | End: 2022-03-01 | Stop reason: HOSPADM

## 2022-03-01 RX ORDER — CARVEDILOL 6.25 MG/1
6.25 TABLET ORAL DAILY
COMMUNITY

## 2022-03-01 RX ORDER — NYSTATIN 100000 [USP'U]/ML
500000 SUSPENSION ORAL
Status: COMPLETED | OUTPATIENT
Start: 2022-03-01 | End: 2022-03-01

## 2022-03-01 RX ORDER — NALOXONE HYDROCHLORIDE 0.4 MG/ML
0.4 INJECTION, SOLUTION INTRAMUSCULAR; INTRAVENOUS; SUBCUTANEOUS AS NEEDED
Status: DISCONTINUED | OUTPATIENT
Start: 2022-03-01 | End: 2022-03-02 | Stop reason: HOSPADM

## 2022-03-01 RX ORDER — ONDANSETRON 2 MG/ML
4 INJECTION INTRAMUSCULAR; INTRAVENOUS ONCE
Status: DISCONTINUED | OUTPATIENT
Start: 2022-03-01 | End: 2022-03-01 | Stop reason: HOSPADM

## 2022-03-01 RX ORDER — ACETAMINOPHEN 500 MG
1000 TABLET ORAL ONCE
Status: COMPLETED | OUTPATIENT
Start: 2022-03-01 | End: 2022-03-01

## 2022-03-01 RX ORDER — KETAMINE HYDROCHLORIDE 50 MG/ML
INJECTION, SOLUTION INTRAMUSCULAR; INTRAVENOUS AS NEEDED
Status: DISCONTINUED | OUTPATIENT
Start: 2022-03-01 | End: 2022-03-01 | Stop reason: HOSPADM

## 2022-03-01 RX ORDER — ONDANSETRON 2 MG/ML
4 INJECTION INTRAMUSCULAR; INTRAVENOUS
Status: DISCONTINUED | OUTPATIENT
Start: 2022-03-01 | End: 2022-03-02 | Stop reason: HOSPADM

## 2022-03-01 RX ORDER — NALOXONE HYDROCHLORIDE 0.4 MG/ML
0.2 INJECTION, SOLUTION INTRAMUSCULAR; INTRAVENOUS; SUBCUTANEOUS AS NEEDED
Status: DISCONTINUED | OUTPATIENT
Start: 2022-03-01 | End: 2022-03-01 | Stop reason: HOSPADM

## 2022-03-01 RX ORDER — CEFAZOLIN SODIUM/WATER 2 G/20 ML
2 SYRINGE (ML) INTRAVENOUS ONCE
Status: COMPLETED | OUTPATIENT
Start: 2022-03-01 | End: 2022-03-01

## 2022-03-01 RX ORDER — KETOROLAC TROMETHAMINE 30 MG/ML
15 INJECTION, SOLUTION INTRAMUSCULAR; INTRAVENOUS
Status: COMPLETED | OUTPATIENT
Start: 2022-03-01 | End: 2022-03-01

## 2022-03-01 RX ORDER — FAMOTIDINE 20 MG/50ML
20 INJECTION, SOLUTION INTRAVENOUS
Status: DISCONTINUED | OUTPATIENT
Start: 2022-03-01 | End: 2022-03-01

## 2022-03-01 RX ORDER — SODIUM CHLORIDE, SODIUM LACTATE, POTASSIUM CHLORIDE, CALCIUM CHLORIDE 600; 310; 30; 20 MG/100ML; MG/100ML; MG/100ML; MG/100ML
150 INJECTION, SOLUTION INTRAVENOUS CONTINUOUS
Status: DISCONTINUED | OUTPATIENT
Start: 2022-03-01 | End: 2022-03-02 | Stop reason: HOSPADM

## 2022-03-01 RX ORDER — BUPIVACAINE HYDROCHLORIDE AND EPINEPHRINE 2.5; 5 MG/ML; UG/ML
INJECTION, SOLUTION EPIDURAL; INFILTRATION; INTRACAUDAL; PERINEURAL AS NEEDED
Status: DISCONTINUED | OUTPATIENT
Start: 2022-03-01 | End: 2022-03-01 | Stop reason: HOSPADM

## 2022-03-01 RX ORDER — MIDAZOLAM HYDROCHLORIDE 1 MG/ML
INJECTION, SOLUTION INTRAMUSCULAR; INTRAVENOUS AS NEEDED
Status: DISCONTINUED | OUTPATIENT
Start: 2022-03-01 | End: 2022-03-01 | Stop reason: HOSPADM

## 2022-03-01 RX ORDER — SODIUM CHLORIDE, SODIUM LACTATE, POTASSIUM CHLORIDE, CALCIUM CHLORIDE 600; 310; 30; 20 MG/100ML; MG/100ML; MG/100ML; MG/100ML
125 INJECTION, SOLUTION INTRAVENOUS CONTINUOUS
Status: DISCONTINUED | OUTPATIENT
Start: 2022-03-01 | End: 2022-03-01

## 2022-03-01 RX ORDER — ENOXAPARIN SODIUM 100 MG/ML
40 INJECTION SUBCUTANEOUS EVERY 12 HOURS
Status: DISCONTINUED | OUTPATIENT
Start: 2022-03-01 | End: 2022-03-02 | Stop reason: HOSPADM

## 2022-03-01 RX ORDER — ALBUTEROL SULFATE 0.83 MG/ML
2.5 SOLUTION RESPIRATORY (INHALATION) AS NEEDED
Status: DISCONTINUED | OUTPATIENT
Start: 2022-03-01 | End: 2022-03-01 | Stop reason: HOSPADM

## 2022-03-01 RX ORDER — SODIUM CHLORIDE, SODIUM LACTATE, POTASSIUM CHLORIDE, CALCIUM CHLORIDE 600; 310; 30; 20 MG/100ML; MG/100ML; MG/100ML; MG/100ML
1000 INJECTION, SOLUTION INTRAVENOUS CONTINUOUS
Status: DISCONTINUED | OUTPATIENT
Start: 2022-03-01 | End: 2022-03-01 | Stop reason: HOSPADM

## 2022-03-01 RX ORDER — FENTANYL CITRATE 50 UG/ML
25 INJECTION, SOLUTION INTRAMUSCULAR; INTRAVENOUS AS NEEDED
Status: DISCONTINUED | OUTPATIENT
Start: 2022-03-01 | End: 2022-03-01 | Stop reason: HOSPADM

## 2022-03-01 RX ORDER — HYDROMORPHONE HYDROCHLORIDE 1 MG/ML
0.5 INJECTION, SOLUTION INTRAMUSCULAR; INTRAVENOUS; SUBCUTANEOUS
Status: DISCONTINUED | OUTPATIENT
Start: 2022-03-01 | End: 2022-03-01 | Stop reason: HOSPADM

## 2022-03-01 RX ORDER — CEFAZOLIN SODIUM/WATER 2 G/20 ML
2 SYRINGE (ML) INTRAVENOUS EVERY 8 HOURS
Status: COMPLETED | OUTPATIENT
Start: 2022-03-01 | End: 2022-03-02

## 2022-03-01 RX ORDER — FENTANYL CITRATE 50 UG/ML
INJECTION, SOLUTION INTRAMUSCULAR; INTRAVENOUS AS NEEDED
Status: DISCONTINUED | OUTPATIENT
Start: 2022-03-01 | End: 2022-03-01 | Stop reason: HOSPADM

## 2022-03-01 RX ORDER — FLUMAZENIL 0.1 MG/ML
0.2 INJECTION INTRAVENOUS
Status: DISCONTINUED | OUTPATIENT
Start: 2022-03-01 | End: 2022-03-01 | Stop reason: HOSPADM

## 2022-03-01 RX ORDER — PROPOFOL 10 MG/ML
INJECTION, EMULSION INTRAVENOUS AS NEEDED
Status: DISCONTINUED | OUTPATIENT
Start: 2022-03-01 | End: 2022-03-01 | Stop reason: HOSPADM

## 2022-03-01 RX ORDER — ENOXAPARIN SODIUM 100 MG/ML
40 INJECTION SUBCUTANEOUS
Status: COMPLETED | OUTPATIENT
Start: 2022-03-01 | End: 2022-03-01

## 2022-03-01 RX ORDER — CARVEDILOL 3.12 MG/1
6.25 TABLET ORAL ONCE
Status: DISCONTINUED | OUTPATIENT
Start: 2022-03-01 | End: 2022-03-01 | Stop reason: HOSPADM

## 2022-03-01 RX ORDER — DIPHENHYDRAMINE HYDROCHLORIDE 50 MG/ML
12.5 INJECTION, SOLUTION INTRAMUSCULAR; INTRAVENOUS
Status: DISCONTINUED | OUTPATIENT
Start: 2022-03-01 | End: 2022-03-01 | Stop reason: HOSPADM

## 2022-03-01 RX ADMIN — SODIUM CHLORIDE, SODIUM LACTATE, POTASSIUM CHLORIDE, AND CALCIUM CHLORIDE: 600; 310; 30; 20 INJECTION, SOLUTION INTRAVENOUS at 13:11

## 2022-03-01 RX ADMIN — ENOXAPARIN SODIUM 40 MG: 100 INJECTION SUBCUTANEOUS at 09:32

## 2022-03-01 RX ADMIN — PROPOFOL 30 MG: 10 INJECTION, EMULSION INTRAVENOUS at 13:19

## 2022-03-01 RX ADMIN — KETAMINE HYDROCHLORIDE 10 MG: 50 INJECTION, SOLUTION, CONCENTRATE INTRAMUSCULAR; INTRAVENOUS at 12:02

## 2022-03-01 RX ADMIN — SODIUM CHLORIDE, SODIUM LACTATE, POTASSIUM CHLORIDE, AND CALCIUM CHLORIDE: 600; 310; 30; 20 INJECTION, SOLUTION INTRAVENOUS at 11:49

## 2022-03-01 RX ADMIN — ONDANSETRON HYDROCHLORIDE 4 MG: 2 INJECTION INTRAMUSCULAR; INTRAVENOUS at 13:17

## 2022-03-01 RX ADMIN — KETOROLAC TROMETHAMINE 15 MG: 30 INJECTION, SOLUTION INTRAMUSCULAR; INTRAVENOUS at 15:22

## 2022-03-01 RX ADMIN — SODIUM CHLORIDE, SODIUM LACTATE, POTASSIUM CHLORIDE, AND CALCIUM CHLORIDE: 600; 310; 30; 20 INJECTION, SOLUTION INTRAVENOUS at 11:01

## 2022-03-01 RX ADMIN — KETAMINE HYDROCHLORIDE 10 MG: 50 INJECTION, SOLUTION, CONCENTRATE INTRAMUSCULAR; INTRAVENOUS at 11:26

## 2022-03-01 RX ADMIN — HYDROMORPHONE HYDROCHLORIDE 0.5 MG: 1 INJECTION, SOLUTION INTRAMUSCULAR; INTRAVENOUS; SUBCUTANEOUS at 14:05

## 2022-03-01 RX ADMIN — SODIUM CHLORIDE, SODIUM LACTATE, POTASSIUM CHLORIDE, AND CALCIUM CHLORIDE 125 ML/HR: 600; 310; 30; 20 INJECTION, SOLUTION INTRAVENOUS at 09:27

## 2022-03-01 RX ADMIN — ROCURONIUM BROMIDE 10 MG: 10 INJECTION, SOLUTION INTRAVENOUS at 12:15

## 2022-03-01 RX ADMIN — KETAMINE HYDROCHLORIDE 10 MG: 50 INJECTION, SOLUTION, CONCENTRATE INTRAMUSCULAR; INTRAVENOUS at 12:49

## 2022-03-01 RX ADMIN — ROCURONIUM BROMIDE 10 MG: 10 INJECTION, SOLUTION INTRAVENOUS at 11:30

## 2022-03-01 RX ADMIN — HYDROMORPHONE HYDROCHLORIDE 0.5 MG: 1 INJECTION, SOLUTION INTRAMUSCULAR; INTRAVENOUS; SUBCUTANEOUS at 14:15

## 2022-03-01 RX ADMIN — NYSTATIN 500000 UNITS: 100000 SUSPENSION ORAL at 09:32

## 2022-03-01 RX ADMIN — ENOXAPARIN SODIUM 40 MG: 100 INJECTION SUBCUTANEOUS at 21:54

## 2022-03-01 RX ADMIN — MORPHINE SULFATE 6 MG: 4 INJECTION INTRAVENOUS at 18:53

## 2022-03-01 RX ADMIN — ROCURONIUM BROMIDE 50 MG: 10 INJECTION, SOLUTION INTRAVENOUS at 11:04

## 2022-03-01 RX ADMIN — KETAMINE HYDROCHLORIDE 20 MG: 50 INJECTION, SOLUTION, CONCENTRATE INTRAMUSCULAR; INTRAVENOUS at 11:03

## 2022-03-01 RX ADMIN — PROPOFOL 150 MG: 10 INJECTION, EMULSION INTRAVENOUS at 11:03

## 2022-03-01 RX ADMIN — FENTANYL CITRATE 50 MCG: 50 INJECTION, SOLUTION INTRAMUSCULAR; INTRAVENOUS at 11:02

## 2022-03-01 RX ADMIN — MORPHINE SULFATE 6 MG: 4 INJECTION INTRAVENOUS at 22:52

## 2022-03-01 RX ADMIN — SODIUM CHLORIDE, SODIUM LACTATE, POTASSIUM CHLORIDE, AND CALCIUM CHLORIDE: 600; 310; 30; 20 INJECTION, SOLUTION INTRAVENOUS at 12:15

## 2022-03-01 RX ADMIN — Medication 2 G: at 11:02

## 2022-03-01 RX ADMIN — MIDAZOLAM 2 MG: 1 INJECTION INTRAMUSCULAR; INTRAVENOUS at 10:57

## 2022-03-01 RX ADMIN — SODIUM CHLORIDE, POTASSIUM CHLORIDE, SODIUM LACTATE AND CALCIUM CHLORIDE 150 ML/HR: 600; 310; 30; 20 INJECTION, SOLUTION INTRAVENOUS at 15:22

## 2022-03-01 RX ADMIN — ACETAMINOPHEN 1000 MG: 500 TABLET ORAL at 09:32

## 2022-03-01 RX ADMIN — LIDOCAINE HYDROCHLORIDE 80 MG: 20 INJECTION, SOLUTION INTRAVENOUS at 11:03

## 2022-03-01 RX ADMIN — ROCURONIUM BROMIDE 10 MG: 10 INJECTION, SOLUTION INTRAVENOUS at 12:03

## 2022-03-01 RX ADMIN — FAMOTIDINE 20 MG: 10 INJECTION, SOLUTION INTRAVENOUS at 09:32

## 2022-03-01 RX ADMIN — HYDROMORPHONE HYDROCHLORIDE 0.5 MG: 1 INJECTION, SOLUTION INTRAMUSCULAR; INTRAVENOUS; SUBCUTANEOUS at 13:27

## 2022-03-01 RX ADMIN — HYDROMORPHONE HYDROCHLORIDE 0.5 MG: 1 INJECTION, SOLUTION INTRAMUSCULAR; INTRAVENOUS; SUBCUTANEOUS at 13:17

## 2022-03-01 RX ADMIN — SUGAMMADEX 200 MG: 100 INJECTION, SOLUTION INTRAVENOUS at 13:30

## 2022-03-01 RX ADMIN — FENTANYL CITRATE 50 MCG: 50 INJECTION, SOLUTION INTRAMUSCULAR; INTRAVENOUS at 13:10

## 2022-03-01 RX ADMIN — CEFAZOLIN 2 G: 10 INJECTION, POWDER, FOR SOLUTION INTRAVENOUS at 18:43

## 2022-03-01 RX ADMIN — LIDOCAINE HYDROCHLORIDE 30 MG: 20 INJECTION, SOLUTION INTRAVENOUS at 11:32

## 2022-03-01 NOTE — ANESTHESIA POSTPROCEDURE EVALUATION
Post-Anesthesia Evaluation and Assessment    Cardiovascular Function/Vital Signs  Visit Vitals  /76   Pulse 81   Temp 36.1 °C (97 °F)   Resp 13   Ht 5' 4\" (1.626 m)   Wt 102.5 kg (226 lb)   SpO2 96%   BMI 38.79 kg/m²       Patient is status post Procedure(s):  LAPAROSCOPIC GASTRIC BYPASS, SMALL BOWEL RESECTION WITH  LIVER WEDGE BIOPSY AND INTRAOPERATIVE ENDSOCOPY. Nausea/Vomiting: Controlled. Postoperative hydration reviewed and adequate. Pain:  Pain Scale 1: FLACC (03/01/22 1420)  Pain Intensity 1: 0 (03/01/22 1420)   Managed. Neurological Status:   Neuro (WDL): Exceptions to WDL (03/01/22 1420)   At baseline. Mental Status and Level of Consciousness: Baseline and appropriate for discharge. Pulmonary Status:   O2 Device: Non-rebreather mask (03/01/22 1340)   Adequate oxygenation and airway patent. Complications related to anesthesia: None    Post-anesthesia assessment completed. No concerns. Patient has met all discharge requirements.     Signed By: Grisel Bishop MD    March 1, 2022

## 2022-03-01 NOTE — INTERVAL H&P NOTE
Update History & Physical    The Patient's History and Physical of February 21, 2022 was reviewed with the patient and I examined the patient. There was no change. The surgical site was confirmed by the patient and me. Plan:  The risk, benefits, expected outcome, and alternative to the recommended procedure have been discussed with the patient. Patient understands and wants to proceed with the procedure.     Electronically signed by Jazmin Rivas MD on 3/1/2022 at 8:57 AM

## 2022-03-01 NOTE — PERIOP NOTES
TRANSFER - IN REPORT:    Verbal report received from Dr Hafsa Hodge and [facility]ilu Coyle [facility]  [facility] for routine progression of care      Report consisted of patients Situation, Background, Assessment and   Recommendations(SBAR). Information from the following report(s) OR Summary, Procedure Summary, Intake/Output and MAR was reviewed with the receiving nurse. Opportunity for questions and clarification was provided. Assessment completed upon patients arrival to unit and care assumed.

## 2022-03-01 NOTE — PERIOP NOTES
Reviewed PTA medication list with patient/caregiver and patient/caregiver denies any additional medications. Patient admits to having a responsible adult care for them at home for at least 24 hours after surgery. Patient encouraged to use gown warming system and informed that using said warming gown to regulate body temperature prior to a procedure has been shown to help reduce the risks of blood clots and infection. Patient's pharmacy of choice verified and documented in PTA medication section. Dual skin assessment & fall risk band verification completed with A Keiko BUNDY.

## 2022-03-01 NOTE — PERIOP NOTES
TRANSFER - OUT REPORT:    Verbal report given to Andrei Modi RN on Tonja Bloom  being transferred to [address] [place] [address] for routine progression of care       Report consisted of patients Situation, Background, Assessment and   Recommendations(SBAR). Information from the following report(s) OR Summary, Procedure Summary, Intake/Output and MAR was reviewed with the receiving nurse. Lines:   Peripheral IV 03/01/22 Left;Posterior Hand (Active)   Site Assessment Clean, dry, & intact 03/01/22 1420   Phlebitis Assessment 0 03/01/22 1420   Infiltration Assessment 0 03/01/22 1420   Dressing Status Clean, dry, & intact 03/01/22 1420   Dressing Type Tape;Transparent 03/01/22 1420   Hub Color/Line Status Green; Infusing;Patent 03/01/22 1420   Alcohol Cap Used No 03/01/22 0971        Intake/Output Summary (Last 24 hours) at 3/1/2022 1441  Last data filed at 3/1/2022 1420  Gross per 24 hour   Intake 4200 ml   Output 315 ml   Net 3885 ml     Visit Vitals  /74   Pulse 80   Temp 97 °F (36.1 °C)   Resp 11   Ht 5' 4\" (1.626 m)   Wt 102.5 kg (226 lb)   SpO2 97%   BMI 38.79 kg/m²       Opportunity for questions and clarification was provided.       Patient transported with:   O2 @ 2 liters  Registered Nurse

## 2022-03-01 NOTE — PROGRESS NOTES
Transition of Care ([facility]WAD[facility]) Plan:          Pt admitted for an elective Gastric Bypass  surgical procedure. Pt is independent and has family support  Please encourage ambulation. No transition of care needs identified at this time. Anticipate pt will be medically stable for discharge within the next 24-48 hours with physician follow up. CM available to assist as needed. [facility] Transportation:   How is patient being transported at discharge? Family/Friend      When? Once cleared by physician     Is transport scheduled? N/A      Follow-up appointment and transportation:   PCP/Specialist?  See AVS for Appointment         Who is transporting to the follow-up appointment? Self/Family/Friend      Is transport for follow up appointment scheduled? N/A    Communication plan (with patient/family): Who is being called? Patient or Next of Kin? Responsible party? Patient      What number(s) is to be used? See Facesheet      What service provider is calling for [facility] services? When are they calling? Readmission Risk?   (Green/Low; Yellow/Moderate; Red/High):  [facility] to complete [address] including selection of the Healthcare Decision Maker Relationship (ie \"Primary\")  Care Management Interventions  Palliative Care Criteria Met (RRAT>21 & CHF Dx)?: No  Mode of Transport at Discharge:  (family)  Physical Therapy Consult: No  Occupational Therapy Consult: No  Support Systems: Child(jackeline)  Confirm Follow Up Transport: Family  The Plan for Transition of Care is Related to the Following Treatment Goals : return back home with family assistance  Discharge Location  Patient Expects to be Discharged to[de-identified] Home

## 2022-03-01 NOTE — BRIEF OP NOTE
Brief Postoperative Note    Patient: Montserrat Cortés  YOB: 1960  MRN: [id number]    Date of Procedure: 3/1/2022     Pre-Op Diagnosis: HYPERTENSION, BMI 36    Post-Op Diagnosis: Same as preoperative diagnosis.       Procedure(s):  LAPAROSCOPIC GASTRIC BYPASS, SMALL BOWEL RESECTION WITH  LIVER WEDGE BIOPSY AND INTRAOPERATIVE ENDOSOCOPY, PARTIAL GASTRECTOMY, LYSIS OF ADHESIONS    Surgeon(s):  Kayla Salguero MD    Surgical Assistant: Physician Assistant: RAMON Goldberg    Anesthesia: General     Estimated Blood Loss (mL): less than 50     Complications: None    Specimens:   ID Type Source Tests Collected by Time Destination   1 : WEDGE LIVER BIOPSY Preservative Liver  Kayla Salguero MD 3/1/2022 1134 Pathology   2 : GASTRIC CARDIA BIOPSY Preservative Stomach  Kayla Salguero MD 3/1/2022 1134 Pathology   3 : PARTIAL GASTRECTOMY Preservative Stomach  Kayla Salguero MD 3/1/2022 1240 Pathology   4 : SMALL BOWEL RESECTION Preservative Intestine  Kayla Salguero MD 3/1/2022 1256 Pathology        Implants: * No implants in log *    Drains:   Sergo-Chiu Drain 03/01/22 Left;Upper Abdomen (Active)       Findings: see dictation    Electronically Signed by Yudelka Dale MD on 3/1/2022 at 1:28 PM

## 2022-03-01 NOTE — NURSE NAVIGATOR
Patient sleeping throughout visit. No signs of pain nor nausea noted. Vitals:   Visit Vitals  /71   Pulse 88   Temp 97.7 °F (36.5 °C)   Resp 16   Ht 5' 4\" (1.626 m)   Wt 102.5 kg (226 lb)   SpO2 98%   BMI 38.79 kg/m²     General: Alert & oriented to person, place, time, and situation  Pulmonary: Lungs clear to auscultation in all fields. Cardiac: Regular rate and rhythm  Abdomen: Appropriate tenderness; soft; non-distended;   hypo-active bowel sounds  Lap sites: Within normal limits  ORAL drain: Small amount of serosanguinous drainage  Dyson catheter: 350 cc clear, yellow urine  SCD's:  In place and operating WNL    Plan:  -Continue medications for symptom management  -Encourage ambulation  -SCD use when in bed  -IS 10 times an hour  -NPO  -UGI in AM; bariatric full liquid diet  if UGI within normal limits  -Dyson removed in AM

## 2022-03-01 NOTE — PROGRESS NOTES
1507 Patient arrives to unit at this time. Dual skin assessment completed with Devan Tompkins RN, no abnormalities noted besides surgical incision to abdomen. Admission assessment completed. Patient is A/O x 4. Lungs clear,  abdomen soft and tender to touch. Bowel sounds active, 18 G IV to left hand  intact and patent infusing. No signs of phlebitis or infiltration noted. TEDs and SCDs applied bilaterally. Skin warm and dry  with  coverderm dressing to 5 trocar sites, CDI. ORAL drain in place, patent, charged and draining, small amount of drainage around ORAL insertion site. Dyson catheter in place draining clear/yellow urine. Pain 8/10, but patient falls asleep during conversation. Patient was oriented to the room to include use of call bell and use of incentive spirometer, patient able to get IS to 500. Phone and call bell left within reach. Plan of care for the day addressed with patient. Educated on pain medication availability and possible side effects as well as need to call for assistance before getting out of bed, patient verbalized understanding.      [address]

## 2022-03-02 ENCOUNTER — APPOINTMENT (OUTPATIENT)
Dept: GENERAL RADIOLOGY | Age: 62
DRG: 403 | End: 2022-03-02
Attending: SPECIALIST
Payer: MEDICAID

## 2022-03-02 VITALS
SYSTOLIC BLOOD PRESSURE: 134 MMHG | HEART RATE: 95 BPM | RESPIRATION RATE: 17 BRPM | OXYGEN SATURATION: 99 % | WEIGHT: 238 LBS | BODY MASS INDEX: 40.63 KG/M2 | DIASTOLIC BLOOD PRESSURE: 92 MMHG | HEIGHT: 64 IN | TEMPERATURE: 98.6 F

## 2022-03-02 PROCEDURE — C9113 INJ PANTOPRAZOLE SODIUM, VIA: HCPCS | Performed by: SPECIALIST

## 2022-03-02 PROCEDURE — 74011250636 HC RX REV CODE- 250/636: Performed by: SPECIALIST

## 2022-03-02 PROCEDURE — 74011000250 HC RX REV CODE- 250: Performed by: SPECIALIST

## 2022-03-02 PROCEDURE — 74011250637 HC RX REV CODE- 250/637: Performed by: SPECIALIST

## 2022-03-02 PROCEDURE — 74240 X-RAY XM UPR GI TRC 1CNTRST: CPT

## 2022-03-02 PROCEDURE — 74011000636 HC RX REV CODE- 636: Performed by: SPECIALIST

## 2022-03-02 RX ORDER — HYDROMORPHONE HYDROCHLORIDE 2 MG/1
2 TABLET ORAL
Status: DISCONTINUED | OUTPATIENT
Start: 2022-03-02 | End: 2022-03-02 | Stop reason: HOSPADM

## 2022-03-02 RX ADMIN — CEFAZOLIN 2 G: 10 INJECTION, POWDER, FOR SOLUTION INTRAVENOUS at 12:13

## 2022-03-02 RX ADMIN — SODIUM CHLORIDE, POTASSIUM CHLORIDE, SODIUM LACTATE AND CALCIUM CHLORIDE 150 ML/HR: 600; 310; 30; 20 INJECTION, SOLUTION INTRAVENOUS at 09:02

## 2022-03-02 RX ADMIN — MORPHINE SULFATE 6 MG: 4 INJECTION INTRAVENOUS at 01:58

## 2022-03-02 RX ADMIN — SODIUM CHLORIDE 40 MG: 9 INJECTION INTRAMUSCULAR; INTRAVENOUS; SUBCUTANEOUS at 09:23

## 2022-03-02 RX ADMIN — HYDROMORPHONE HYDROCHLORIDE 2 MG: 2 TABLET ORAL at 13:13

## 2022-03-02 RX ADMIN — CEFAZOLIN 2 G: 10 INJECTION, POWDER, FOR SOLUTION INTRAVENOUS at 02:00

## 2022-03-02 RX ADMIN — IOHEXOL 125 ML: 240 INJECTION, SOLUTION INTRATHECAL; INTRAVASCULAR; INTRAVENOUS; ORAL at 08:02

## 2022-03-02 RX ADMIN — ENOXAPARIN SODIUM 40 MG: 100 INJECTION SUBCUTANEOUS at 09:06

## 2022-03-02 RX ADMIN — MORPHINE SULFATE 6 MG: 4 INJECTION INTRAVENOUS at 05:12

## 2022-03-02 RX ADMIN — MORPHINE SULFATE 6 MG: 4 INJECTION INTRAVENOUS at 09:01

## 2022-03-02 NOTE — PROGRESS NOTES
2100-alert and oriented x 4. Lungs CTA on 2LNC. BS active x 4 quads. Dressing to abdomen x 5 trocar sites, CDI. Dressing to ORAL insertion site saturated with drainage. Old dressing removed and able to see drainage leaking out at insertion site in drips. Dr. Charlotte Murdock informed and asked if he wanted Lovenox held. /63, pulse 84, drain output for the day so far is 35 ml, and urine output so far for today is 1075 ml. Patient with no distress, no dizziness, or lightheadedness. Dr. Charlotte Murdock wants the patient to receive the lovenox. Dressing changed to ORAL site. Pain rated 5/10 to abdomen. IV access to left hand infusing LR at 150 ml/hr without difficulty. Dyson catheter in place draining clear yellow urine without difficulty. 2252-Morphine given for pain to abdomen rated 9/10.    2345-Pain decreased to 5/10.    0148-ambulated in hallway around to next floor and returned to room, gait steady. Dyson catheter removed. 0158-Morphine given for pain rated 10/10.  0241-Pain decreased to 5/10.    0512-Pain rated 10/10 to abdomen, Morphine given. 0610-Pain decreased to 5/10. Shift summary-ambulates with steady gait in hallway. Morphine given for pain with good effects. ORAL drain in place. Dyson catheter removed early d/t patient voided around it after ambulating. Patient is burping, but not passing gas at this time. Voiding without difficulty.

## 2022-03-02 NOTE — PROGRESS NOTES
Discharge instructions discussed with patient. No questions or concerns noted at this time. IV removed and pt tolerated well. Awaiting transport at this time. No further concerns noted.

## 2022-03-02 NOTE — PROGRESS NOTES
conducted an initial consultation and Spiritual Assessment for Jan Shaw, who is a 58 y.o.,female. Patient's Primary Language is: [place]. According to the patient's EMR Latter-day Affiliation is: Contreras Santiago. The reason the Patient came to the hospital is:   Patient Active Problem List    Diagnosis Date Noted    Severe obesity (BMI 35.0-39. 9) with comorbidity ([address] 03/01/2022    Sick[facility] trait ([facility]     Complication of internal knee prosthesis ([facility] 11/18/2021    Low vitamin B12 level 08/16/2021    S/P laparoscopic sleeve gastrectomy 10/08/2019    Hypovitaminosis D 10/08/2019    UTI (lower urinary tract infection) 11/06/2015    Osteoarthritis 11/05/2015    Hypokalemia 11/05/2015    Urinary retention 11/05/2015    Fracture of distal femur ([facility] 11/04/2015    Intestinal malabsorption 01/24/2014    H. pylori infection 01/24/2014    Chronic gastritis 01/24/2014    Hypertension     Hypercholesteremia     Hx of smoking     Migraine     Depression     Arthritic-like pain     Abnormal EKG 01/26/2011        The  provided the following Interventions:   initiated a relationship of care and support. Explored issues of osiel, belief, spirituality and Zoroastrian/ritual needs while hospitalized. I listened empathically and provided space for her to share her feelings and concerns. Offered prayer and assurance of continued prayers on patient's behalf. The following outcomes where achieved:  Patient shared information about her medical narrative and spiritual journey/beliefs.  confirmed Patient's Latter-day Affiliation. Patient expressed gratitude for 's visit. Assessment:  Patient does not have any Zoroastrian/cultural needs that will affect patient's preferences in health care. There are no spiritual or Zoroastrian issues which require intervention at this time.      Plan:  Chaplains will continue to follow and will provide pastoral care on an as needed/requested basis.  recommends bedside caregivers page  on duty if patient shows signs of acute spiritual or emotional distress.     [address].

## 2022-03-02 NOTE — DISCHARGE SUMMARY
Discharge Summary    Patient: Ada Perry               Sex: female          DOA: 3/1/2022         YOB: 1960      Age:  58 y.o.        LOS:  LOS: 1 day                Admit Date: 3/1/2022    Discharge Date: 3/2/2022    Admission Diagnoses: Severe obesity (BMI 35.0-39. 9) with comorbidity ([facility] [E66.01]    Discharge Diagnoses:    Problem List as of 3/2/2022 Date Reviewed: 3/1/2022          Codes Class Noted - Resolved    Severe obesity (BMI 35.0-39. 9) with comorbidity [facility]) ICD-10-CM: E66.01  ICD-9-CM: 278.01  3/1/2022 - Present        Sickle cell trait ([facility] ICD-10-CM: D57.3  ICD-9-CM: 282.5  Unknown - Present        Complication of internal knee prosthesis ([facility] ICD-10-CM: T84. Jeralene Brunner, [id number]  ICD-9-CM: 996.77, V43.65  11/18/2021 - Present        Low vitamin B12 level ICD-10-CM: E53.8  ICD-9-CM: 266.2  8/16/2021 - Present        S/P laparoscopic sleeve gastrectomy ICD-10-CM: Z98.84  ICD-9-CM: V45.86  10/8/2019 - Present        Hypovitaminosis D ICD-10-CM: E55.9  ICD-9-CM: 268.9  10/8/2019 - Present        UTI (lower urinary tract infection) ICD-10-CM: N39.0  ICD-9-CM: 599.0  11/6/2015 - Present        Osteoarthritis ICD-10-CM: M19.90  ICD-9-CM: 715.90  11/5/2015 - Present        Hypokalemia ICD-10-CM: E87.6  ICD-9-CM: 276.8  11/5/2015 - Present        Urinary retention ICD-10-CM: R33.9  ICD-9-CM: 788.20  11/5/2015 - Present        Fracture of distal femur ([facility] ICD-10-CM: G76.034J  ICD-9-CM: 821.20  11/4/2015 - Present        Intestinal malabsorption ICD-10-CM: K90.9  ICD-9-CM: 579.9  1/24/2014 - Present        H. pylori infection ICD-10-CM: A04.8  ICD-9-CM: 041.86  1/24/2014 - Present        Chronic gastritis ICD-10-CM: K29.50  ICD-9-CM: 535.10  1/24/2014 - Present        Hypertension ICD-10-CM: I10  ICD-9-CM: 401.9  Unknown - Present        Hypercholesteremia ICD-10-CM: E78.00  ICD-9-CM: 272.0  Unknown - Present        Hx of smoking ICD-10-CM: Z87.891  ICD-9-CM: V15.82  Unknown - Present Migraine ICD-10-CM: H43.120  ICD-9-CM: 346.90  Unknown - Present        Depression ICD-10-CM: F32. A  ICD-9-CM: 924  Unknown - Present        Arthritic-like pain ICD-10-CM: M25.50  ICD-9-CM: 719.40  Unknown - Present        Abnormal EKG ICD-10-CM: R94.31  ICD-9-CM: 794.31  1/26/2011 - Present        RESOLVED: Morbid obesity ([facility] ICD-10-CM: E66.01  ICD-9-CM: 278.01  Unknown - 11/15/2017        RESOLVED: Myocardial infarction ([facility] ICD-10-CM: I21.9  ICD-9-CM: 410.90  12/28/2011 - 1/11/2012        RESOLVED: Myocardial infarction ([facility] ICD-10-CM: I21.9  ICD-9-CM: 410.90  Unknown - 1/11/2012    Overview Addendum 1/26/2011 12:20 PM by Amanda Cullen      Santohijenise Zamarripa was told had a mild heart attack. Doesn't remebr having a cath. with it. Was in ICU in 3 days. Hasn't seen cardiology after that. RESOLVED: Preop cardiovascular exam ICD-10-CM: Z01.810  ICD-9-CM: V72.81  1/26/2011 - 11/15/2017        RESOLVED: Myocardial infarct, old ICD-10-CM: I25.2  ICD-9-CM: 905  1/26/2011 - 1/11/2012              Discharge Condition: Good    Hospital Course: The patient underwent  laparoscopic gastric bypass surgery  on 3/1/2022. The patient tolerated the procedure well. Vital signs remained stable and the patient was transferred to  3rd floor surgical unit without complications. The patient remained stable throughout the first night post operatively with stable vital signs and adequate urine output and pain control. Pain was controlled with Dilaudid IV and IV Tylenol . The patient on the first morning post operative was transferred to the radiology suite where they underwent a gastrograffin UGI study which showed no evidence of a leak or stricture. The drain was discontinued on POD # 1 and the patient was started on a bariatric liquid diet with protein shakes. The patient progressed throughout the day and was ambulating well and tolerating their diet.  They were therefore discharged home with instructions to notify me with any issues that may arise. Significant Diagnostic Studies:   No results for input(s): HGB, HGBEXT in the last 72 hours. No results for input(s): HCT, HCTEXT in the last 72 hours. Current Discharge Medication List      CONTINUE these medications which have NOT CHANGED    Details   carvediloL (COREG) 6.25 mg tablet Take 6.25 mg by mouth daily as needed. Supposed to take daily, but only takes as needed when she has pain in arm      buPROPion XL (WELLBUTRIN XL) 150 mg tablet Take 300 mg by mouth daily. vilazodone (VIIBRYD) 40 mg tab tablet Take  by mouth daily. multivitamin (ONE A DAY) tablet Take 1 Tablet by mouth daily. cyanocobalamin (VITAMIN B12) 1,000 mcg/mL injection 1 mL by SubCUTAneous route every thirty (30) days. Qty: 30 mL, Refills: 0    Associated Diagnoses: Intestinal malabsorption, unspecified type; S/P laparoscopic sleeve gastrectomy; Hypovitaminosis D; Hypokalemia; Essential hypertension; Low vitamin B12 level      Syringe with Needle, Disp, 3 mL 25 x 5/8\" syrg 1 mL by SubCUTAneous route every thirty (30) days. Qty: 15 Syringe, Refills: 0    Associated Diagnoses: Intestinal malabsorption, unspecified type; S/P laparoscopic sleeve gastrectomy; Hypovitaminosis D; Hypokalemia; Essential hypertension; Low vitamin B12 level      traZODone (DESYREL) 100 mg tablet Take 300 mg by mouth. hydrOXYzine HCl (ATARAX) 25 mg tablet Take 25 mg by mouth three (3) times daily as needed for Anxiety.          STOP taking these medications       OTHER Comments:   Reason for Stopping:         Klor-Con M20 20 mEq tablet Comments:   Reason for Stopping:         linaCLOtide (Linzess) 145 mcg cap capsule Comments:   Reason for Stopping:         ergocalciferol (Vitamin D2) 1,250 mcg (50,000 unit) capsule Comments:   Reason for Stopping:         Aimovig Autoinjector 140 mg/mL injection Comments:   Reason for Stopping:               Activity: activity as tolerated with no heavy lifting of greater than 20 pounds. No anti- inflammatory medications. Use stool softeners at home as needed while taking pain medications since they are constipating. Diet: Bariatric liquid diet    Wound Care: Keep wound clean and dry, Reinforce dressing PRN and ice to area for comfort. Do not get wound wet for 2 days.     Follow-up: 14 days with Dr Rosa Isela Hernandez M.D

## 2022-03-02 NOTE — ROUTINE PROCESS
Bedside and Verbal shift change report given to [address] (oncoming nurse) by Alexandra Baca RN (offgoing nurse). Report included the following information SBAR, Kardex, Intake/Output and MAR.

## 2022-03-02 NOTE — PROGRESS NOTES
Problem: Falls - Risk of  Goal: *Absence of Falls  Description: Document Adeline Gomez Fall Risk and appropriate interventions in the flowsheet.   3/2/2022 1323 by Madyson Cummins RN  Outcome: Resolved/Met  Note: Fall Risk Interventions:  Mobility Interventions: Patient to call before getting OOB,Utilize walker, cane, or other assistive device    Mentation Interventions: Increase mobility    Medication Interventions: Patient to call before getting OOB,Teach patient to arise slowly    Elimination Interventions: Call light in reach,Toileting schedule/hourly rounds           3/2/2022 1017 by Madyson Cummins RN  Outcome: Progressing Towards Goal  Note: Fall Risk Interventions:  Mobility Interventions: Patient to call before getting OOB,Utilize walker, cane, or other assistive device    Mentation Interventions: Increase mobility    Medication Interventions: Patient to call before getting OOB,Teach patient to arise slowly    Elimination Interventions: Call light in reach,Toileting schedule/hourly rounds              Problem: Patient Education: Go to Patient Education Activity  Goal: Patient/Family Education  3/2/2022 1323 by Madyson Cummins RN  Outcome: Resolved/Met  3/2/2022 1017 by Madyson Cummins RN  Outcome: Progressing Towards Goal

## 2022-03-02 NOTE — PROGRESS NOTES
Pt sitting in bed during morning assessment, visiting with house staff. Pt is alert and oriented with moderate pain, managing with PRN medications. Pt is encouraged to walk the halls in the morning but states she wanted to wait for her pain medications to kick in and still prefers to have staff with her during ambulation. Pt abdomen trochar sites are clean and intact and ORAL drain intact with small amount of drainage coming from incision site, MD aware and pt aware ORAL will be removed today. No other skin issues noted. Lungs are clear and no SOB noted. Bowels are hypoactive and pt does not report passing much gas yet, MD aware. Pt is voiding well with some assistance to restroom. Will continue to monitor; no further concerns at this time.

## 2022-03-02 NOTE — DISCHARGE INSTRUCTIONS
Patient Education        Learning About Weight-Loss (Bariatric) Surgery  What is weight-loss surgery? Bariatric surgery is surgery to help you lose weight. This type of surgery is only used for people who are very overweight and have not been able to lose weight with diet and exercise. This surgery makes the stomach smaller. Some types of surgery also change the connection between your stomach and intestines. Having weight-loss surgery is a big step. After surgery, you'll need to make new, lifelong changes in how you eat and drink. How is weight-loss surgery done? Bariatric surgery may be either \"open\" or \"laparoscopic. \" Open surgery is done through a large cut (incision) in the belly. Laparoscopic surgery is done through several small cuts. The doctor puts a lighted tube, or scope, and other surgical tools through small cuts in your belly. The doctor is able to see your organs with the scope. There are different types of bariatric surgery. Gastric sleeve surgery  The surgery is usually done through several small incisions in the belly. The doctor removes more than half of your stomach. This leaves a thin sleeve, or tube, that is about the size of a banana. Because part of your stomach has been removed, this can't be reversed. Selma-en-Y gastric bypass surgery  Selma-en-Y (say \"santhosh-en-why\") surgery changes the connection between the stomach and the intestines. The doctor separates a section of your stomach from the rest of your stomach. This makes a small pouch. The new pouch will hold the food you eat. The doctor connects the stomach pouch to the middle part of the small intestine. Gastric banding surgery  The surgery is usually done through several small incisions in the belly. The doctor wraps a band around the upper part of the stomach. This creates a small pouch. The small size of the pouch means that you will get full after you eat just a small amount of food.  The doctor can inflate or deflate the band to adjust the size. This lets the doctor adjust how quickly food passes from the new pouch into the stomach. It does not change the connection between the stomach and the intestines. What can you expect after the surgery? You may stay in the hospital for one or more days after the surgery. How long you stay depends on the type of surgery you had. Most people need 2 to 4 weeks before they are ready to get back to their usual routine. Your doctor will give you specific instructions about what to eat after the surgery. You'll start with only small amounts of soft foods and liquids. Bit by bit, you will be able to eat more solid foods. Your doctor may advise you to work with a dietitian. This way you'll be sure to get enough protein, vitamins, and minerals while you are losing weight. Even with a healthy diet, you may need to take vitamin and mineral supplements. After surgery, you will not be able to eat very much at one time. You will get full quickly. Try not to eat too much at one time or eat foods that are high in fat or sugar. If you do, you may vomit, get stomach pain, or have diarrhea. Weight loss  You probably will lose weight very quickly in the first few months after surgery. As time goes on, your weight loss will slow down. You will have regular doctor visits to check how you are doing. Emotions  It is common to have many emotions after this surgery. You may feel happy or excited as you begin to lose weight. But you may also feel overwhelmed or frustrated by the changes that you have to make in your diet, activity, and lifestyle. Talk with your doctor if you have concerns or questions. Think of bariatric surgery as a tool to help you lose weight. It isn't an instant fix. You will still need to eat a healthy diet and get regular exercise. This will help you reach your weight goal and avoid regaining the weight you lose. Follow-up care is a key part of your treatment and safety.  Be sure to make and go to all appointments, and call your doctor if you are having problems. It's also a good idea to know your test results and keep a list of the medicines you take. Where can you learn more? Go to http://www.gray.com/  Enter G469 in the search box to learn more about \"Learning About Weight-Loss (Bariatric) Surgery. \"  Current as of: March 17, 2021               Content Version: 13.0  © 2433-8752 [employer]. Care instructions adapted under license by [employer] (which disclaims liability or warranty for this information). If you have questions about a medical condition or this instruction, always ask your healthcare professional. [place] [address] any warranty or liability for your use of this information. [facility]  Heather Muse. Sandra Dominguez M.D., F.A.C.S.  [address] [place] [address] [address]. [address] [facility]  Office: [phone]    Fax:  [phone]    Discharge Instruction for Gastric Bypass / Sleeve Gastrectomy Patients    Diet:   Continue with the liquid diet until you are seen in the office. Make sure you sip fluids all day. Aim for  Gms of protein every day. Activity:   Walking is encouraged. Rest when you are tired.  You may shower.  You may climb stairs. Take your time.  No lifting more than 10-15 lbs.  If you feel discomfort during an activity, rest.   Do not drive for 1 week. Wound Care:   Clean incisions with soap and water when in the shower. Pat dry.  Leave steri-strips on until they fall off.  A small amount of drainage may be present from the incisions. Contact the office if you notice an increase in drainage, an odor, increased redness, or fever > 100.5. Medications:   You will receive a prescription for pain medication at the time of discharge.    For upset stomach you may take over the counter medications such as Maalox, Mylanta, Pepcid, Zantac, or Tagamet.  You may take Tylenol   Non-aspirin based arthritis medications may be resumed after the first month.  Take a childrens or adult chewable multivitamin.  Milk of Magnesia will help with constipation.  It is fine to take your usual home medications. Blood pressure medications should be continued after surgery. Diabetic medications can frequently be reduced very quickly after surgery. Diabetics should continue to monitor blood sugars frequently after surgery and contact the prescribing physician for any questions. Follow-Up Appointment:   If you do not already have a follow-up appointment scheduled, contact the office in the next few days to obtain one. It is usually scheduled for 10-14 days after you surgery date. Office phone number:  [phone].         REV  09/2010

## 2022-03-02 NOTE — ROUTINE PROCESS
Bedside and Verbal shift change report given to ASUNCION Stockton RN (oncoming nurse) by [employer] RN (offgoing nurse). Report included the following information SBAR, Kardex, Intake/Output and MAR.

## 2022-03-02 NOTE — PROGRESS NOTES
Problem: Falls - Risk of  Goal: *Absence of Falls  Description: Document Michael Murcia Fall Risk and appropriate interventions in the flowsheet.   Outcome: Progressing Towards Goal  Note: Fall Risk Interventions:  Mobility Interventions: Patient to call before getting OOB,Utilize walker, cane, or other assistive device    Mentation Interventions: Increase mobility    Medication Interventions: Patient to call before getting OOB,Teach patient to arise slowly    Elimination Interventions: Call light in reach,Toileting schedule/hourly rounds              Problem: Patient Education: Go to Patient Education Activity  Goal: Patient/Family Education  Outcome: Progressing Towards Goal

## 2022-03-02 NOTE — PROGRESS NOTES
ORAL drain removed per order; patient tolerated well with minimal discomfort. Pt has bariatric tray present in room, working with incentive spirometer; no further concerns at this time.

## 2022-03-03 ENCOUNTER — TELEPHONE (OUTPATIENT)
Dept: SURGERY | Age: 62
End: 2022-03-03

## 2022-03-03 NOTE — PROGRESS NOTES
Progress Note  03/02/22    Ms. Heidi Wallace had a relatively uneventful night. She had minimal drainage from her ORAL drain. Her vital signs were stable and urine output was excellent. Her upper GI study today was normal, there was no evidence of any leak. We did start her on a liquid diet, which she tolerated uneventfully. After removing her ORAL drain, she did feel like she was ready for discharge home and she was instructed to give us a call if she has any issues, otherwise we will follow up with her in the office for her routine follow up in two weeks. Addendum:  I did get a call through the answering service from Ms. Szymanski at about 8 PM.  She had a low grade fever of about 100.5. She was using incentive spirometry, she felt well otherwise, and I instructed her that this was probably related to atelectasis and she needs to continue to use her incentive spirometry on a regular basis. She will call us if she has any further issues.

## 2022-03-03 NOTE — OP NOTES
[facility]  OPERATIVE REPORT    Name:  Chino Whitlock  MR#:   [id number]  :  1960  ACCOUNT #:  [de-identified]  DATE OF SERVICE:  2022    PREOPERATIVE DIAGNOSES:  1. Persistent severe obesity, status post laparoscopic sleeve gastrectomy. 2.  Intractable gastroesophageal reflux disease. POSTOPERATIVE DIAGNOSES:  1. Persistent severe obesity, status post laparoscopic sleeve gastrectomy, dilation of stomach near proximal pouch. 2.  Necrosis of small bowel. 3.  Fatty infiltration of the liver. 4.  Extensive intra-abdominal adhesions. PROCEDURES PERFORMED:  1. Laparoscopic lysis of adhesions in excess of 45 minutes' duration. 2.  Laparoscopic conversion of sleeve gastrectomy to laparoscopic gastric bypass procedure with a 150-cm [place] limb bypass in an antecolic-antegastric fashion. 3.  Partial gastrectomy. 4.  Small bowel resection. 5.  Wedge liver biopsy. 6.  Endoscopy with biopsy. SURGEON:  Babak Zhao MD    ASSISTANT:  ALPHONSE Becker MS., Florine Abt, assisted with the procedure since there were no qualified surgeons, interns, or residents available. He assisted with exposure during the procedure, massive adhesiolysis, creation of new gastric pouch, Selma limb, and anastomosis; partial gastrectomy, resection of small bowel, closure of skin and fascial incisions. ANESTHESIA:  General endotracheal.    COMPLICATIONS:  None. SPECIMENS REMOVED:  1. Partial gastrectomy resected specimen. 2.  Small bowel resected specimen. 3.  Wedge liver biopsy specimen. 4.  Endoscopy biopsy specimen. IMPLANTS:  None. .    ESTIMATED BLOOD LOSS:  Approximately 50 mL. INDICATIONS:  The patient is a 60-year-old female, who had had a sleeve gastrectomy many years ago for the diagnosis of morbid obesity. She initially did fairly well with her weight loss, but then has had weight regain over time and subsequently developed intractable reflux disease.   She eventually presented to us in consultation for her situation. We performed upper GI study on her, which really did not show any significant obvious abnormalities, but due to her intractable reflux and her weight regain, we did offer her conversion to the gastric bypass procedure to achieve both weight loss goal and cure of her reflux. She at this time period does wish to proceed with surgery. PROCEDURE:  The patient was brought to the operating room and placed on the table in supine position, at which time general anesthesia was administered without any difficulty. Her abdomen was then prepped and draped in the usual sterile fashion. Using a 15-blade, a 1-cm incision was made just to left of the umbilicus. Veress needle approach was used to gain access to the peritoneal cavity, which was then insufflated. The [place] Number was then placed at that site, then four additional trocars were placed in the usual U-shaped configuration. On entering the abdomen, the patient was noted to have classic adhesive disease consistent with the prior surgery. She actually had adhesions deep in her pelvis from prior surgery. I now began the extensive adhesiolysis beginning in the pelvis, freeing up the omentum, freeing it up out of the pelvis. Then, I began the dissection of the midabdominal region freeing up the omentum from the anterior abdominal wall, freeing the entire left subhepatic space all the way to level of diaphragm including the lateral wall of the sleeve. Total adhesiolysis took in excess of 45 minutes to achieve. I then placed the calibration tube into the gastric pouch, inflated the balloon to 20 mL of saline solution, and pulled it back towards the gastroesophageal junction. At this time period, I then dissected along the lesser curvature of the stomach entering the retrogastric space.   Once this was achieved, I then knew I would be able to convert her to a gastric bypass, therefore I deflated the calibration tube and removed it from the operative field. At this time period, I then turned my attention towards the small bowel portion of the operation, where I measured out an area approximately 40-50 cm distal to the ligament of Treitz. I transected the small bowel using the Endo PALAK stapler with white reloads, then undercut the mesentery of the small bowel using two firings of the Endo PALAK stapler, which allowed for excellent mobilization to the upper abdomen. At this juncture, I then measured off a 150-cm [place] limb bypass, placing it in a side-to-side fashion with afferent limb, placing the stay sutures in two limbs of the bowel. I then created enterotomies in two limbs of the bowel. I placed the stapling device intraluminally. I then closed it and fired it creating the linear anastomosis. With this anastomosis being hemostatic, free margin of the enterotomy was then reapproximated using 2-0 Ethibond suture. These sutures were then held up to facilitate closure using the stapling device. I then closed the mesenteric defect at that site using running 2-0 Ethibond suture. It should be noted, during the operation we began to have dissection of CO2 gas within the subcutaneous tissue, which made the operation very difficult since there was so much subcutaneous emphysema present that the manipulation of trocars was very difficult along this entire extent of the procedure. We rapidly turned our attention towards the upper abdomen, where we created lateral gastrotomy in the distal sleeve, then guided the cap of a 21 ILS stapler transabdominally. I guided through the lateral gastrotomy out through the anterior gastric pouch in the area I had marked previously using a [place] grasper and Prolene suture attached to the cap. A pursestring suture was then placed at the base and tied securely. I then created the new gastric pouch by using the [place] 60 stapler with gold reloads.   I fired one firing along the base of the planned pouch and then due to dilation of her proximal stomach, we fired vertically towards the angle of His performing a resection of the dilated stomach. Once this was achieved, I then closed the lateral gastrotomy in two-layered fashion using 2-0 Vicryl suture in running fashion, then 2-0 Lembert Ethibond suture. All areas were now hemostatic and we brought the Selma limb in an antecolic-antegastric fashion. The free margin of Selma limb was then opened up using Harmonic scalpel  and the circular stapling device was then guided through the left lower quadrant incision through the enterotomy which we had created, then I deployed the sphere through the antimesenteric border of the bowel. It was then attached to the cap, closed, and fired creating the circular anastomosis. With two very good tissue rings noted within the stapling device, the free margin of the [place] limb was then trimmed free using the Endo PALAK stapler with white reloads. I then oversewed the anastomosis using 2-0 Ethibond suture and oversewed the staple line completely. It should be noted that prior to creating the gastrojejunal anastomosis, the Selma limb end for about 5-6 cm had become necrotic. I had to resect this using the [place] 60 stapler and I did submit that to Pathology for permanent section. I went to the head of the table at this juncture, I performed the endoscopy on the patient. The patient's oropharynx was normal.  Distal esophagus was normal.  Gastric pouch was entered, it was totally viable. Anastomosis was nonbleeding and the small bowel portion of the anastomosis was completely viable. A biopsy was taken of the lateral pouch wall and submitted to Pathology for H. pylori assessment. At this juncture, all areas were then checked for hemostasis. I did biopsy the left lobe of the liver due to significant enlargement, nodularity throughout, and I submitted to Pathology for permanent section. All areas were hemostatic.   I did place Vistaseal in the upper and lower abdomen in the area of the staple lines, and then placed Surgicel where needed. With everything being hemostatic, I then removed the liver retractor. I placed a ORAL drain in the upper abdomen, brought out via the left upper quadrant incision. We did close the left lower quadrant trocar site using a transabdominal 0 PDS suture along the fascia, and all skin incisions were then closed using 4-0 subcuticular Monocryl. Steri-Strips and sterile dressings were applied. The patient tolerated the procedure well.       Aleena Jimenez MD      [id number]  D:  03/03/2022 11:34  T:  03/03/2022 14:46  JOB #:  [id number]

## 2022-03-04 ENCOUNTER — TELEPHONE (OUTPATIENT)
Dept: SURGERY | Age: 62
End: 2022-03-04

## 2022-03-04 NOTE — TELEPHONE ENCOUNTER
This RN spoke with patient post-operatively. Hydration: Patient is not maintaining a hydration log. RN re-educated her on the importance of doing so. She verbalized understanding. Nausea and/or vomitting: None    Pain: Patient continues with pain and is taking Dilaudid. Her pain is to the left side and worsens with movement. RN recommended rotating with Tylenol and purchasing an abdominal binder. She has compression undergarments and will purchase an abdominal brace. Lovenox injections: Administering every 12 hours, rotating sites. RN reminded patient to complete all injections, in which patient verbalized understanding. Lap sites: No erythema, drainage, and/or swelling    ORAL drain site: No drainage; dressing changed    BM: None to date, but is passing flatus. Ambulation: Patient is walking throughout house every hour. IS: Patient continues to use 10x's per hour while awake. She has reached 1,500 mL. RN encouraged her to strive for 2,500 mL to avoid pneumonia; she verbalized understanding. Temperature: 98.4 degrees    Medications: (confirmed currently taking)   *Multi-vitamin: yes   *Probiotic: yes   *Prilosec: No, but will take it today. Questions: None    This RN reminded the patient to contact the office with any questions and/or concerns. RN also reminded patient they will receive another follow-up TC prior to the two week post-op follow-up appointment. Patient verbalized understanding to both. Patient's two week post-op visit is scheduled and was confirmed.

## 2022-03-07 ENCOUNTER — TELEPHONE (OUTPATIENT)
Dept: SURGERY | Age: 62
End: 2022-03-07

## 2022-03-07 NOTE — TELEPHONE ENCOUNTER
This RN spoke with patient post-operatively. Hydration: Patient has hydrated with 72 ounces Saturday and 62 on Sunday. Nausea and/or vomitting: None    Pain: Currently managed with Dilaudid. She ordered an abdominal brace that should be delivered tomorrow. Lovenox injections: Administering every 12 hours, rotating sites. RN reminded patient to complete all injections, in which patient verbalized understanding. Lap sites: No erythema, drainage, and/or swelling    ORAL drain site: No drainage; dressing removed    BM: First BM yesterday. Ambulation: Patient is walking throughout house every hour. IS: Patient continues to use 10x's per hour while awake. She has reached 1,500 mL. Temperature: 98.2 degrees    Medications: (confirmed currently taking)   *Multi-vitamin: yes   *Probiotic: yes   *Prilosec: No.  She was confused and thought it was to be sent electronically. RN reminded her it is over the counter, and she will have her son pick it up today. Questions: None    This RN reminded the patient to contact the office with any questions and/or concerns. RN also reminded patient they will receive another follow-up TC prior to the two week post-op follow-up appointment. Patient verbalized understanding to both. Patient's two week post-op visit is scheduled and was confirmed.

## 2022-03-11 ENCOUNTER — TELEPHONE (OUTPATIENT)
Dept: SURGERY | Age: 62
End: 2022-03-11

## 2022-03-11 NOTE — TELEPHONE ENCOUNTER
This RN spoke with patient post-operatively. Hydration: Patient has hydrated with 80 ounces as of yesterday. Nausea and/or vomitting: None    Pain: Currently managed without medication    Lovenox injections: Administering every 12 hours, rotating sites. RN reminded patient to complete all injections, in which patient verbalized understanding. Lap sites: No erythema, drainage, and/or swelling    ORAL drain site: No drainage; dressing removed    BM: Three since Saturday    Ambulation: Patient is walking throughout house every hour. IS: Patient continues to use 10x's per hour while awake. Temperature: 98.8 degrees    Medications: (confirmed currently taking)   *Multi-vitamin: yes   *Probiotic: yes   *Prilosec: yes    Questions: None    This RN reminded the patient to contact the office with any questions and/or concerns. Patient verbalized understanding. Patient's two week post-op visit is scheduled and was confirmed.

## 2022-03-15 ENCOUNTER — TELEPHONE (OUTPATIENT)
Dept: SURGERY | Age: 62
End: 2022-03-15

## 2022-03-15 ENCOUNTER — OFFICE VISIT (OUTPATIENT)
Dept: SURGERY | Age: 62
End: 2022-03-15

## 2022-03-15 DIAGNOSIS — Z98.84 S/P GASTRIC BYPASS: Primary | ICD-10-CM

## 2022-03-15 NOTE — PROGRESS NOTES
Spoke with Rosalva Nj  today. Pt is approx. 2 weeks S/P conversion of sleeve to LGBP with YOUNG procedure. Tolerating liquid diet very well. Protein shake intake: 2-3 Premier protein shake supplement. Fluid intake: \"a lot\" 86 oz/day, pt states that she sips on water \"all day\". Foods being consumed include SF jello, SF popsicles, broth, and protein shakes. No complaints. Wt: 214 lbs    Pre-op Wt: 223 lbs    EBW: 78  lbs   12 % EBWL    Activity: Pt states that for the last 2 d, she has had pain in left side, so has not be exercising, beyond ADLs. Pt states that she walked around [place] yesterday and \"couldn't almost get out of the car because in so much pain\". Pt reports that she is still occasionally using the prescribed Dilaudid. Supplements:  [x] Drew's Complete Chewable MVI BID  [x] Probiotic QD  [x]Prilosec QD    Pt given one on one diet education over the phone. Reviewed diet progression for weeks 3-4. Patient appears to have a good understanding of the diet progression, food choices, and dietary/exercise habits for successful weight loss and nourishment after surgery. Reviewed pp. 32-45 of the patient education book. Discussed: post-op diet progression, including soft/pureed high protein, low fat, low sugar food recommendations; proper food group choices;  consumption of food and liquids, and consumption of adequate no-sugar, no-caffeine, no carbonation liquids. We reviewed appropriate food choices, meal adaptations (use of smaller dishes/utensils, eat slowly and chewing sufficiently for digestion), cooking techniques, and eating behavior modifications. Discussed intake regimen with 3 meals and 2-3 protein supplements per day. Additionally, intake timing - to include consuming a meal or snack every 3-4 hrs, the 30:30 rule, and having a meal within 2 hours of waking .  Reinforced the importance of continued vitamin & probiotic consumption, adequate fluid with goal of 64 oz per day and adequate protein with goal of  grams per day. Stressed the importance of 30 min of physical activity each day, as tolerated, with restricted lifting, to improve post op weight loss results and bowel function. Pt voiced understanding through repeating the information back to me. All pt nutrition-related questions answered. Reminded pt of their appointment for their 2 week post-op medical evaluation  on 3/16/2022   at 1:00 PM .  Additionally reminded pt that a RD would be calling them again at their 1 mo. post-op rosa. Pt provided with RD contact information for nutritional questions or concerns between now and then.     RD: Parveen Fairchild MS, TEAGAN/ROSIE

## 2022-03-16 ENCOUNTER — OFFICE VISIT (OUTPATIENT)
Dept: SURGERY | Age: 62
End: 2022-03-16
Payer: MEDICARE

## 2022-03-16 ENCOUNTER — APPOINTMENT (OUTPATIENT)
Dept: CT IMAGING | Age: 62
End: 2022-03-16
Attending: EMERGENCY MEDICINE
Payer: MEDICARE

## 2022-03-16 ENCOUNTER — HOSPITAL ENCOUNTER (EMERGENCY)
Age: 62
Discharge: HOME | End: 2022-03-16
Attending: STUDENT IN AN ORGANIZED HEALTH CARE EDUCATION/TRAINING PROGRAM
Payer: MEDICARE

## 2022-03-16 VITALS
BODY MASS INDEX: 36.64 KG/M2 | HEIGHT: 64 IN | DIASTOLIC BLOOD PRESSURE: 74 MMHG | HEART RATE: 85 BPM | SYSTOLIC BLOOD PRESSURE: 127 MMHG | OXYGEN SATURATION: 92 % | WEIGHT: 214.6 LBS | TEMPERATURE: 98.6 F

## 2022-03-16 VITALS
SYSTOLIC BLOOD PRESSURE: 120 MMHG | WEIGHT: 214 LBS | HEIGHT: 64 IN | BODY MASS INDEX: 36.54 KG/M2 | OXYGEN SATURATION: 99 % | HEART RATE: 92 BPM | RESPIRATION RATE: 15 BRPM | DIASTOLIC BLOOD PRESSURE: 60 MMHG | TEMPERATURE: 98.2 F

## 2022-03-16 DIAGNOSIS — Z98.84 STATUS POST GASTRIC BYPASS FOR OBESITY: ICD-10-CM

## 2022-03-16 DIAGNOSIS — Z09 POSTOPERATIVE EXAMINATION: Primary | ICD-10-CM

## 2022-03-16 DIAGNOSIS — E87.6 HYPOKALEMIA: Primary | ICD-10-CM

## 2022-03-16 DIAGNOSIS — R10.9 LEFT SIDED ABDOMINAL PAIN: ICD-10-CM

## 2022-03-16 DIAGNOSIS — R94.31 ABNORMAL EKG: ICD-10-CM

## 2022-03-16 LAB
ALBUMIN SERPL-MCNC: 3.2 G/DL (ref 3.4–5)
ALBUMIN/GLOB SERPL: 0.7 {RATIO} (ref 0.8–1.7)
ALP SERPL-CCNC: 77 U/L (ref 45–117)
ALT SERPL-CCNC: 19 U/L (ref 13–56)
ANION GAP SERPL CALC-SCNC: 9 MMOL/L (ref 3–18)
AST SERPL-CCNC: 9 U/L (ref 10–38)
BASOPHILS # BLD: 0 K/UL (ref 0–0.1)
BASOPHILS NFR BLD: 1 % (ref 0–2)
BILIRUB SERPL-MCNC: 0.2 MG/DL (ref 0.2–1)
BUN SERPL-MCNC: 12 MG/DL (ref 7–18)
BUN/CREAT SERPL: 21 (ref 12–20)
CALCIUM SERPL-MCNC: 8.8 MG/DL (ref 8.5–10.1)
CHLORIDE SERPL-SCNC: 99 MMOL/L (ref 100–111)
CO2 SERPL-SCNC: 32 MMOL/L (ref 21–32)
CREAT SERPL-MCNC: 0.57 MG/DL (ref 0.6–1.3)
DIFFERENTIAL METHOD BLD: ABNORMAL
EOSINOPHIL # BLD: 0.3 K/UL (ref 0–0.4)
EOSINOPHIL NFR BLD: 5 % (ref 0–5)
ERYTHROCYTE [DISTWIDTH] IN BLOOD BY AUTOMATED COUNT: 15.3 % (ref 11.6–14.5)
GLOBULIN SER CALC-MCNC: 4.5 G/DL (ref 2–4)
GLUCOSE SERPL-MCNC: 95 MG/DL (ref 74–99)
HCT VFR BLD AUTO: 35.7 % (ref 35–45)
HGB BLD-MCNC: 11.5 G/DL (ref 12–16)
IMM GRANULOCYTES # BLD AUTO: 0 K/UL (ref 0–0.04)
IMM GRANULOCYTES NFR BLD AUTO: 0 % (ref 0–0.5)
LIPASE SERPL-CCNC: 97 U/L (ref 73–393)
LYMPHOCYTES # BLD: 2.4 K/UL (ref 0.9–3.6)
LYMPHOCYTES NFR BLD: 34 % (ref 21–52)
MAGNESIUM SERPL-MCNC: 2.2 MG/DL (ref 1.6–2.6)
MCH RBC QN AUTO: 25.1 PG (ref 24–34)
MCHC RBC AUTO-ENTMCNC: 32.2 G/DL (ref 31–37)
MCV RBC AUTO: 77.9 FL (ref 78–100)
MONOCYTES # BLD: 0.6 K/UL (ref 0.05–1.2)
MONOCYTES NFR BLD: 9 % (ref 3–10)
NEUTS SEG # BLD: 3.7 K/UL (ref 1.8–8)
NEUTS SEG NFR BLD: 52 % (ref 40–73)
NRBC # BLD: 0 K/UL (ref 0–0.01)
NRBC BLD-RTO: 0 PER 100 WBC
PLATELET # BLD AUTO: 432 K/UL (ref 135–420)
PMV BLD AUTO: 10.4 FL (ref 9.2–11.8)
POTASSIUM SERPL-SCNC: 2.5 MMOL/L (ref 3.5–5.5)
PROT SERPL-MCNC: 7.7 G/DL (ref 6.4–8.2)
RBC # BLD AUTO: 4.58 M/UL (ref 4.2–5.3)
SODIUM SERPL-SCNC: 140 MMOL/L (ref 136–145)
TROPONIN-HIGH SENSITIVITY: 10 NG/L (ref 0–54)
WBC # BLD AUTO: 7.1 K/UL (ref 4.6–13.2)

## 2022-03-16 PROCEDURE — 84484 ASSAY OF TROPONIN QUANT: CPT

## 2022-03-16 PROCEDURE — 96365 THER/PROPH/DIAG IV INF INIT: CPT

## 2022-03-16 PROCEDURE — 96366 THER/PROPH/DIAG IV INF ADDON: CPT

## 2022-03-16 PROCEDURE — 74011250637 HC RX REV CODE- 250/637: Performed by: EMERGENCY MEDICINE

## 2022-03-16 PROCEDURE — 74011000636 HC RX REV CODE- 636: Performed by: EMERGENCY MEDICINE

## 2022-03-16 PROCEDURE — 83690 ASSAY OF LIPASE: CPT

## 2022-03-16 PROCEDURE — 99284 EMERGENCY DEPT VISIT MOD MDM: CPT

## 2022-03-16 PROCEDURE — 96361 HYDRATE IV INFUSION ADD-ON: CPT

## 2022-03-16 PROCEDURE — 99024 POSTOP FOLLOW-UP VISIT: CPT | Performed by: NURSE PRACTITIONER

## 2022-03-16 PROCEDURE — 85025 COMPLETE CBC W/AUTO DIFF WBC: CPT

## 2022-03-16 PROCEDURE — 96368 THER/DIAG CONCURRENT INF: CPT

## 2022-03-16 PROCEDURE — 80053 COMPREHEN METABOLIC PANEL: CPT

## 2022-03-16 PROCEDURE — 74177 CT ABD & PELVIS W/CONTRAST: CPT

## 2022-03-16 PROCEDURE — 83735 ASSAY OF MAGNESIUM: CPT

## 2022-03-16 PROCEDURE — 93005 ELECTROCARDIOGRAM TRACING: CPT

## 2022-03-16 PROCEDURE — 74011000636 HC RX REV CODE- 636: Performed by: STUDENT IN AN ORGANIZED HEALTH CARE EDUCATION/TRAINING PROGRAM

## 2022-03-16 PROCEDURE — 74011250636 HC RX REV CODE- 250/636: Performed by: EMERGENCY MEDICINE

## 2022-03-16 RX ORDER — MAGNESIUM SULFATE HEPTAHYDRATE 40 MG/ML
2 INJECTION, SOLUTION INTRAVENOUS ONCE
Status: COMPLETED | OUTPATIENT
Start: 2022-03-16 | End: 2022-03-16

## 2022-03-16 RX ORDER — POTASSIUM CHLORIDE 7.45 MG/ML
10 INJECTION INTRAVENOUS
Status: COMPLETED | OUTPATIENT
Start: 2022-03-16 | End: 2022-03-16

## 2022-03-16 RX ORDER — CHOLECALCIFEROL (VITAMIN D3) 50 MCG
1 CAPSULE ORAL DAILY
COMMUNITY
End: 2022-08-18 | Stop reason: CLARIF

## 2022-03-16 RX ORDER — ONDANSETRON 4 MG/1
4 TABLET, ORALLY DISINTEGRATING ORAL
Qty: 30 TABLET | Refills: 0 | Status: SHIPPED | OUTPATIENT
Start: 2022-03-16 | End: 2022-08-18 | Stop reason: CLARIF

## 2022-03-16 RX ORDER — OMEPRAZOLE 20 MG/1
20 TABLET, DELAYED RELEASE ORAL DAILY
COMMUNITY
End: 2022-04-14

## 2022-03-16 RX ORDER — MAGNESIUM SULFATE HEPTAHYDRATE 500 MG/ML
2 INJECTION, SOLUTION INTRAMUSCULAR; INTRAVENOUS
Status: DISCONTINUED | OUTPATIENT
Start: 2022-03-16 | End: 2022-03-16

## 2022-03-16 RX ADMIN — POTASSIUM BICARBONATE 40 MEQ: 782 TABLET, EFFERVESCENT ORAL at 17:09

## 2022-03-16 RX ADMIN — IOHEXOL: 240 INJECTION, SOLUTION INTRATHECAL; INTRAVASCULAR; INTRAVENOUS; ORAL at 14:31

## 2022-03-16 RX ADMIN — IOPAMIDOL 100 ML: 612 INJECTION, SOLUTION INTRAVENOUS at 15:38

## 2022-03-16 RX ADMIN — POTASSIUM CHLORIDE 10 MEQ: 7.46 INJECTION, SOLUTION INTRAVENOUS at 16:12

## 2022-03-16 RX ADMIN — POTASSIUM CHLORIDE 10 MEQ: 7.46 INJECTION, SOLUTION INTRAVENOUS at 17:40

## 2022-03-16 RX ADMIN — MAGNESIUM SULFATE HEPTAHYDRATE 2 G: 40 INJECTION, SOLUTION INTRAVENOUS at 17:27

## 2022-03-16 RX ADMIN — POTASSIUM CHLORIDE 10 MEQ: 7.46 INJECTION, SOLUTION INTRAVENOUS at 18:20

## 2022-03-16 RX ADMIN — SODIUM CHLORIDE 500 ML: 9 INJECTION, SOLUTION INTRAVENOUS at 19:30

## 2022-03-16 RX ADMIN — POTASSIUM CHLORIDE 10 MEQ: 7.46 INJECTION, SOLUTION INTRAVENOUS at 15:11

## 2022-03-16 NOTE — ED PROVIDER NOTES
EMERGENCY DEPARTMENT HISTORY AND PHYSICAL EXAM    Date: 3/16/2022  Patient Name: Toña Woods    History of Presenting Illness     Chief Complaint   Patient presents with    Abdominal Pain    Post OP Complication         History Provided By: Patient      Additional History (Context): Toña Woods is a 58 y.o. female with obesity and s/p gastric sleeve to bypass performed 2 weeks ago who presents with complaining of persistent left-sided abdominal pain that is worse with any kind of effort particularly walking. Was seen by her surgeon's office today, Dr. Yudy Hogue office and Hema Palencia examined patient and advised her to come to the emergency department. Denies any drainage from the wound or fever. Denies nausea vomiting. Tolerated p.o. Jell-O this morning at 1030 and has been doing water sips today otherwise. Denies melena or hematochezia dysuria flank pain. PCP: Tim Mars DO    Current Facility-Administered Medications   Medication Dose Route Frequency Provider Last Rate Last Admin    sodium chloride 0.9 % bolus infusion 500 mL  500 mL IntraVENous ONCE Kyleigh Rosas PA         Current Outpatient Medications   Medication Sig Dispense Refill    potassium bicarb-citric acid (EFFER-K) 20 mEq tablet Take 1 Tablet by mouth two (2) times a day for 3 days. 6 Tablet 0    B.animalis,bifid,infantis,long (Probiotic 4X) 10-15 mg TbEC Take  by mouth.  omeprazole (PriLOSEC OTC) 20 mg tablet Take 20 mg by mouth daily.  pediatric multivitamin no.76 (FLINTSTONES COMPLETE PO) Take  by mouth.  ondansetron (ZOFRAN ODT) 4 mg disintegrating tablet Take 1 Tablet by mouth every eight (8) hours as needed for Nausea or Vomiting. 30 Tablet 0    carvediloL (COREG) 6.25 mg tablet Take 6.25 mg by mouth daily as needed. Supposed to take daily, but only takes as needed when she has pain in arm      multivitamin (ONE A DAY) tablet Take 1 Tablet by mouth daily.       cyanocobalamin (VITAMIN B12) 1,000 mcg/mL injection 1 mL by SubCUTAneous route every thirty (30) days. (Patient not taking: Reported on 3/16/2022) 30 mL 0    Syringe with Needle, Disp, 3 mL 25 x 5/8\" syrg 1 mL by SubCUTAneous route every thirty (30) days. 15 Syringe 0    traZODone (DESYREL) 100 mg tablet Take 300 mg by mouth.  buPROPion XL (WELLBUTRIN XL) 150 mg tablet Take 300 mg by mouth daily.  hydrOXYzine HCl (ATARAX) 25 mg tablet Take 25 mg by mouth three (3) times daily as needed for Anxiety.  vilazodone (VIIBRYD) 40 mg tab tablet Take  by mouth daily.          Past History     Past Medical History:  Past Medical History:   Diagnosis Date    Anemia     Arthritis     Bipolar disorder ([address]     Depression     Hx of smoking     Hypercholesteremia     Hypertension     Menopause     Migraine     [facility]bid [facility]     Postmenopausal     Sickle cell trait ([facility]        Past Surgical History:  Past Surgical History:   Procedure Laterality Date    DELIVERY   18    HX CHOLECYSTECTOMY      laparoscopic    HX CYST INCISION AND DRAINAGE      HX FEMUR FRACTURE TX      HX GI  2013    sleeve resection    HX KNEE ARTHROSCOPY Left 2010     x 2    HX KNEE REPLACEMENT  2011    bilateral    HX LAP GASTRIC BYPASS  2022    Dr Gaby Travis, conversion from sleeve    HX ORTHOPAEDIC      left knee revision       Family History:  Family History   Problem Relation Age of Onset    Heart Disease Mother         Mom was told she had irreg heart beat/hole in [place], [place] Breast Cancer Mother     Other Other         Non- contributory    Breast Cancer Maternal Grandmother     Malignant Hyperthermia Neg Hx     Pseudocholinesterase Deficiency Neg Hx     Delayed Awakening Neg Hx     Post-op Nausea/Vomiting Neg Hx     Emergence Delirium Neg Hx     Post-op Cognitive Dysfunction Neg Hx        Social History:  Social History     Tobacco Use    Smoking status: Former Smoker     Packs/day: 0.25     Years: 35.00 Pack years: 8.75     Types: Cigarettes     Quit date:      Years since quittin.2    Smokeless tobacco: Former User   Vaping Use    Vaping Use: Never used   Substance Use Topics    Alcohol use: Yes     Alcohol/week: 0.8 standard drinks     Types: 1 Standard drinks or equivalent per week     Comment: 5 glasses wine per month    Drug use: Never       Allergies: Allergies   Allergen Reactions    Percocet [Oxycodone-Acetaminophen] Itching         Review of Systems   Review of Systems   Constitutional: Negative for fever. Gastrointestinal: Positive for abdominal pain. Negative for diarrhea, nausea and vomiting. Skin: Positive for wound. All Other Systems Negative  Physical Exam     Vitals:    22 1344 22 1712 22 1742 22 1812   BP: 122/78 (!) 134/98 (!) 99/57    Pulse: 88 85 82 82   Resp: 18 17 16 19   Temp: 98.2 °F (36.8 °C)      SpO2: 95% 96% 100% 99%   Weight: 97.1 kg (214 lb)      Height: 5' 4\" (1.626 m)        Physical Exam  Vitals and nursing note reviewed. Constitutional:       Appearance: She is well-developed. She is obese. HENT:      Head: Normocephalic and atraumatic. Right Ear: External ear normal.      Left Ear: External ear normal.      Nose: Nose normal.   Eyes:      Conjunctiva/sclera: Conjunctivae normal.      Pupils: Pupils are equal, round, and reactive to light. Neck:      Vascular: No JVD. Trachea: No tracheal deviation. Cardiovascular:      Rate and Rhythm: Normal rate and regular rhythm. Heart sounds: Normal heart sounds. No murmur heard. No friction rub. No gallop. Pulmonary:      Effort: Pulmonary effort is normal. No respiratory distress. Breath sounds: Normal breath sounds. No wheezing or rales. Abdominal:      General: Bowel sounds are normal. There is no distension. Palpations: Abdomen is soft. There is no mass. Tenderness: There is abdominal tenderness in the left upper quadrant and left lower quadrant. There is no guarding or rebound. Comments: Postoperative wounds look well-healed. No redness or drainage. Musculoskeletal:         General: No tenderness. Normal range of motion. Cervical back: Normal range of motion and neck supple. Skin:     General: Skin is warm and dry. Findings: No rash. Neurological:      Mental Status: She is alert and oriented to person, place, and time. Cranial Nerves: No cranial nerve deficit. Deep Tendon Reflexes: Reflexes are normal and symmetric. Psychiatric:         Behavior: Behavior normal.            Diagnostic Study Results     Labs -     Recent Results (from the past 12 hour(s))   CBC WITH AUTOMATED DIFF    Collection Time: 03/16/22  2:15 PM   Result Value Ref Range    WBC 7.1 4.6 - 13.2 K/uL    RBC 4.58 4.20 - 5.30 M/uL    HGB 11.5 (L) 12.0 - 16.0 g/dL    HCT 35.7 35.0 - 45.0 %    MCV 77.9 (L) 78.0 - 100.0 FL    MCH 25.1 24.0 - 34.0 PG    MCHC 32.2 31.0 - 37.0 g/dL    RDW 15.3 (H) 11.6 - 14.5 %    PLATELET 097 (H) 470 - 420 K/uL    MPV 10.4 9.2 - 11.8 FL    NRBC 0.0 0  WBC    ABSOLUTE NRBC 0.00 0.00 - 0.01 K/uL    NEUTROPHILS 52 40 - 73 %    LYMPHOCYTES 34 21 - 52 %    MONOCYTES 9 3 - 10 %    EOSINOPHILS 5 0 - 5 %    BASOPHILS 1 0 - 2 %    IMMATURE GRANULOCYTES 0 0.0 - 0.5 %    ABS. NEUTROPHILS 3.7 1.8 - 8.0 K/UL    ABS. LYMPHOCYTES 2.4 0.9 - 3.6 K/UL    ABS. MONOCYTES 0.6 0.05 - 1.2 K/UL    ABS. EOSINOPHILS 0.3 0.0 - 0.4 K/UL    ABS. BASOPHILS 0.0 0.0 - 0.1 K/UL    ABS. IMM.  GRANS. 0.0 0.00 - 0.04 K/UL    DF AUTOMATED     METABOLIC PANEL, COMPREHENSIVE    Collection Time: 03/16/22  2:15 PM   Result Value Ref Range    Sodium 140 136 - 145 mmol/L    Potassium 2.5 (LL) 3.5 - 5.5 mmol/L    Chloride 99 (L) 100 - 111 mmol/L    CO2 32 21 - 32 mmol/L    Anion gap 9 3.0 - 18 mmol/L    Glucose 95 74 - 99 mg/dL    BUN 12 7.0 - 18 MG/DL    Creatinine 0.57 (L) 0.6 - 1.3 MG/DL    BUN/Creatinine ratio 21 (H) 12 - 20      GFR est AA >60 >60 ml/min/1.73m2 GFR est non-AA >60 >60 ml/min/1.73m2    Calcium 8.8 8.5 - 10.1 MG/DL    Bilirubin, total 0.2 0.2 - 1.0 MG/DL    ALT (SGPT) 19 13 - 56 U/L    AST (SGOT) 9 (L) 10 - 38 U/L    Alk. phosphatase 77 45 - 117 U/L    Protein, total 7.7 6.4 - 8.2 g/dL    Albumin 3.2 (L) 3.4 - 5.0 g/dL    Globulin 4.5 (H) 2.0 - 4.0 g/dL    A-G Ratio 0.7 (L) 0.8 - 1.7     LIPASE    Collection Time: 03/16/22  2:15 PM   Result Value Ref Range    Lipase 97 73 - 393 U/L   MAGNESIUM    Collection Time: 03/16/22  2:15 PM   Result Value Ref Range    Magnesium 2.2 1.6 - 2.6 mg/dL   TROPONIN-HIGH SENSITIVITY    Collection Time: 03/16/22  2:15 PM   Result Value Ref Range    Troponin-High Sensitivity 10 0 - 54 ng/L   EKG, 12 LEAD, INITIAL    Collection Time: 03/16/22  5:09 PM   Result Value Ref Range    Ventricular Rate 82 BPM    Atrial Rate 82 BPM    P-R Interval 158 ms    QRS Duration 86 ms    Q-T Interval 452 ms    QTC Calculation (Bezet) 528 ms    Calculated P Axis 17 degrees    Calculated R Axis -9 degrees    Calculated T Axis -12 degrees    Diagnosis       Normal sinus rhythm  Minimal voltage criteria for LVH, may be normal variant ( R in aVL )  Prolonged QT  Abnormal ECG  When compared with ECG of 21-FEB-2022 15:37,  Questionable change in QRS axis  Nonspecific T wave abnormality now evident in Inferior leads  QT has lengthened         Radiologic Studies -   CT ABD PELV W CONT   Final Result      1. Postoperative changes from recent prior laparoscopic Selma-en-Y gastric   bypass surgical procedure.      > Evidence of prior sleeve gastrectomy with subsequent Selma-en-Y conversion. Normal-appearing antecolic/antegastric Selma limb.      > Mild, nonspecific stranding within the anterior upper abdominal omental soft   tissues with a small quantity of fluid and gas adjacent to the posterior left   hepatic lobe; likely incompletely resorbed from prior surgery.  No evidence of   extraluminal oral contrast present.      > Mild intra-abdominal stranding at the site of prior laparotomy port   placement. 2. Mild bibasilar atelectasis. No pleural abnormality or consolidation. CT Results  (Last 48 hours)               03/16/22 1549  CT ABD PELV W CONT Final result    Impression:      1. Postoperative changes from recent prior laparoscopic Selma-en-Y gastric   bypass surgical procedure.      > Evidence of prior sleeve gastrectomy with subsequent Selma-en-Y conversion. Normal-appearing antecolic/antegastric Selma limb.      > Mild, nonspecific stranding within the anterior upper abdominal omental soft   tissues with a small quantity of fluid and gas adjacent to the posterior left   hepatic lobe; likely incompletely resorbed from prior surgery. No evidence of   extraluminal oral contrast present.      > Mild intra-abdominal stranding at the site of prior laparotomy port   placement. 2. Mild bibasilar atelectasis. No pleural abnormality or consolidation. Narrative:  EXAM: CT of the abdomen and pelvis       INDICATION: 51-year-old patient with abdominal pain, recent prior gastric bypass   surgical procedure. COMPARISON: Postop swallow examination performed 3/2/2022. TECHNIQUE: Axial CT imaging of the abdomen and pelvis was performed with oral   and intravenous contrast. Multiplanar reformats were generated. One or more dose reduction techniques were used on this CT: automated exposure   control, adjustment of the mAs and/or kVp according to patient size, and   iterative reconstruction techniques. The specific techniques used on this CT   exam have been documented in the patient's electronic medical record. Digital   Imaging and Communications in Medicine (DICOM) format image data are available   to nonaffiliated external healthcare facilities or entities on a secure, media   free, reciprocally searchable basis with patient authorization for at least a   12-month period after this study.        _______________       FINDINGS: LOWER CHEST: Mild bibasilar atelectasis without pleural effusion or   consolidation. Cardiac size. No pericardial effusion. LIVER, BILIARY: Liver is normal. No biliary dilation. Cholecystectomy       PANCREAS: Normal.       SPLEEN: Normal.       ADRENALS: Normal.       KIDNEYS/URETERS/BLADDER: Symmetric renal enhancement. No hydronephrosis or   urolithiasis. Urinary bladder unremarkable in CT appearance. PELVIC ORGANS: Unremarkable. VASCULATURE: Abdominal aorta is normal in course and caliber. Visceral arterial   and venous branches are patent. LYMPH NODES: No enlarged lymph nodes. GASTROINTESTINAL TRACT:      > Post surgical changes from Selma-en-Y antecolic antegastric gastric bypass   surgical procedure are noted. Suture line along the greater curvature likely in   keeping with prior sleeve gastrectomy. There is no evidence of extraluminal oral   contrast present. Minor stranding within the omental soft tissues of the upper   abdomen is noted along with an ill-defined and small quantity of fluid and gas   interposed between the posterior left hepatic lobe and adjacent proximal GJ   anastomosis. > Normal distribution of small and large bowel throughout the abdomen/pelvis.     > No morphology of bowel obstruction.      > No gross free intraperitoneal gas.      > Multiple colonic diverticula without diverticulitis. Normal appendix. BONES: No acute or aggressive appearing osseous abnormality demonstrated. OTHER: Mild straightening within the anterior abdominal wall at the site of   prior laparotomy port placement.       _______________               CXR Results  (Last 48 hours)    None            Medical Decision Making   I am the first provider for this patient. I reviewed the vital signs, available nursing notes, past medical history, past surgical history, family history and social history. Vital Signs-Reviewed the patient's vital signs.     Records Reviewed: Nursing Notes    Procedures:  EKG    Date/Time: 3/16/2022 5:09 PM  Performed by: RAMON Manzo Aas  Authorized by: RAMON Manzo Aas     ECG reviewed by ED Physician in the absence of a cardiologist: yes    Interpretation:     Interpretation: abnormal    Rate:     ECG rate:  82    ECG rate assessment: normal    Rhythm:     Rhythm: sinus rhythm    Ectopy:     Ectopy: none    QRS:     QRS axis:  Normal    QRS intervals:  Normal  Conduction:     Conduction: normal    ST segments:     ST segments:  Normal  T waves:     T waves: normal    Other findings:     Other findings: prolonged qTc interval      Other findings comment:  QTc 528ms        Provider Notes (Medical Decision Making):   Patient has hypokalemia but otherwise routine postop findings that were discussed with Dr. Usha Bueno. no abscess, perforation. Replacing potassium IV as well as to effervescent. Discussed with bariatric surgeon preferred method for oral replacement at home and have sent effervescent. Patient will also receive dose here. Given IV magnesium for prolonged QTC. MED RECONCILIATION:  Current Facility-Administered Medications   Medication Dose Route Frequency    sodium chloride 0.9 % bolus infusion 500 mL  500 mL IntraVENous ONCE     Current Outpatient Medications   Medication Sig    potassium bicarb-citric acid (EFFER-K) 20 mEq tablet Take 1 Tablet by mouth two (2) times a day for 3 days.  B.animalis,bifid,infantis,long (Probiotic 4X) 10-15 mg TbEC Take  by mouth.  omeprazole (PriLOSEC OTC) 20 mg tablet Take 20 mg by mouth daily.  pediatric multivitamin no.76 (FLINTSTONES COMPLETE PO) Take  by mouth.  ondansetron (ZOFRAN ODT) 4 mg disintegrating tablet Take 1 Tablet by mouth every eight (8) hours as needed for Nausea or Vomiting.  carvediloL (COREG) 6.25 mg tablet Take 6.25 mg by mouth daily as needed.  Supposed to take daily, but only takes as needed when she has pain in arm    multivitamin (ONE A DAY) tablet Take 1 Tablet by mouth daily.  cyanocobalamin (VITAMIN B12) 1,000 mcg/mL injection 1 mL by SubCUTAneous route every thirty (30) days. (Patient not taking: Reported on 3/16/2022)    Syringe with Needle, Disp, 3 mL 25 x 5/8\" syrg 1 mL by SubCUTAneous route every thirty (30) days.  traZODone (DESYREL) 100 mg tablet Take 300 mg by mouth.  buPROPion XL (WELLBUTRIN XL) 150 mg tablet Take 300 mg by mouth daily.  hydrOXYzine HCl (ATARAX) 25 mg tablet Take 25 mg by mouth three (3) times daily as needed for Anxiety.  vilazodone (VIIBRYD) 40 mg tab tablet Take  by mouth daily. Disposition:  home    DISCHARGE NOTE:   8:06 PM    Pt has been reexamined. Patient has no new complaints, changes, or physical findings. Care plan outlined and precautions discussed. Results of labs, CT were reviewed with the patient. All medications were reviewed with the patient; will d/c home with potassium. All of pt's questions and concerns were addressed. Patient was instructed and agrees to follow up with PCP, bariatric surgeon, as well as to return to the ED upon further deterioration. Patient is ready to go home. Follow-up Information     Follow up With Specialties Details Why Contact Info    Marielle Solorzano MD Bariatrics, General Surgery Schedule an appointment as soon as possible for a visit in 1 day  [address] [place] [id number] [phone]      [facility] EMERGENCY DEPT Emergency Medicine  If symptoms worsen return immediately [address]  [phone]          Current Discharge Medication List      START taking these medications    Details   potassium bicarb-citric acid (EFFER-K) 20 mEq tablet Take 1 Tablet by mouth two (2) times a day for 3 days.   Qty: 6 Tablet, Refills: 0  Start date: 3/16/2022, End date: 3/19/2022               Core Measures:    Critical Care Time:   Critical Care Time:   I have spent 45 minutes of critical care time involved in lab review, consultations with specialist, family decision-making, and documentation. During this entire length of time I was immediately available to the patient.     Critical Care: The reason for providing this level of medical care for this critically ill patient was due a critical illness that impaired one or more vital organ systems such that there was a high probability of imminent or life threatening deterioration in the patients condition. This care involved high complexity decision making to assess, manipulate, and support vital system functions, to treat this degreee vital organ system failure and to prevent further life threatening deterioration of the patients condition. Diagnosis     Clinical Impression:   1. Hypokalemia    2. Left sided abdominal pain    3. Status post gastric bypass for obesity    4.  Abnormal EKG

## 2022-03-16 NOTE — PROGRESS NOTES
Subjective:      Cindy De La Rosa is a 58 y.o. female is now 2 weeks status post laparoscopic conversion sleeve gastrectomy to gastric bypass with lysis of adhesions greater than 45 minutes and small bowel resection. Doing well overall. She has lost a total of 9 pounds since surgery. Body mass index is 36.84 kg/m². Currently on a liquid diet without difficulty. Taking in 80+oz water daily. Sources of protein include protein shakes. Small amounts of walking due to pain. Has been having left lower incisional pain since surgery and nausea without vomiting. Does seem to get better with laying down and worsen with movement. Using dilaudid only at night. No pain medication during day. Bowel movements are regular. Pain is not well controlled. Medication(s) being used: dilaudid. The patient is compliant with multivitamins. Surgery related complications: NA.  Liver bx report reviewed with patient. Stomach bx report reviewed with patient. Did have positive h pylori on biopsy for sleeve in 2013 and treated with prevpac. Weight Loss Metrics 3/16/2022 3/2/2022 3/1/2022 2/22/2022 2/21/2022 11/22/2021 11/18/2021   Today's Wt 214 lb 9.6 oz 238 lb - 222 lb 222 lb 9.6 oz 218 lb 218 lb 3.2 oz   BMI 36.84 kg/m2 - 40.85 kg/m2 38.11 kg/m2 38.21 kg/m2 37.42 kg/m2 37.45 kg/m2          Comorbidities:    Hypertension: improved , off medications, elevated today because of pain  Diabetes: not applicable  Obstructive Sleep Apnea: not applicable  Hyperlipidemia: not applicable  Stress Urinary Incontinence: not applicable  Gastroesophageal Reflux: not applicable  Weight related arthropathy:unchanged    Denies diagnosis of COVID-19 since surgery. Patient Active Problem List   Diagnosis Code    Abnormal EKG R94.31    Hypertension I10    Hypercholesteremia E78.00    Hx of smoking Z87.891    Migraine G43.909    Depression F32. A    Arthritic-like pain M25.50    Intestinal malabsorption K90.9    H. pylori infection A04.8    Chronic gastritis K29.50    Fracture of distal femur (HCC) S72.409A    Osteoarthritis M19.90    Hypokalemia E87.6    Urinary retention R33.9    UTI (lower urinary tract infection) N39.0    S/P laparoscopic sleeve gastrectomy Z98.84    Hypovitaminosis D E55.9    Low vitamin B12 level D65.9    Complication of internal knee prosthesis ([facility] [address] T84. 9XXA, Z96.659    Sickle cell trait ([address] D57.3    Severe obesity (BMI 35.0-39. 9) with comorbidity ([address] E66.01        Past Medical History:   Diagnosis Date    Anemia     Arthritis     Bipolar disorder ([address]     Depression     Hx of smoking     Hypercholesteremia     Hypertension     Menopause     Migraine     Morbid obesity ([address]     Postmenopausal     Sickle cell trait ([address]        Past Surgical History:   Procedure Laterality Date    DELIVERY   18    HX CHOLECYSTECTOMY      laparoscopic    HX CYST INCISION AND DRAINAGE      HX FEMUR FRACTURE TX      HX GI  2013    sleeve resection    HX KNEE ARTHROSCOPY Left 2010     x 2    HX KNEE REPLACEMENT  2011    bilateral    HX LAP GASTRIC BYPASS  2022    Dr Sofia Guillen, conversion from sleeve    HX ORTHOPAEDIC  -    left knee revision       Current Outpatient Medications   Medication Sig Dispense Refill    B.animalis,bifid,infantis,long (Probiotic 4X) 10-15 mg TbEC Take  by mouth.  omeprazole (PriLOSEC OTC) 20 mg tablet Take 20 mg by mouth daily.  pediatric multivitamin no.76 (FLINTSTONES COMPLETE PO) Take  by mouth.  ondansetron (ZOFRAN ODT) 4 mg disintegrating tablet Take 1 Tablet by mouth every eight (8) hours as needed for Nausea or Vomiting. 30 Tablet 0    carvediloL (COREG) 6.25 mg tablet Take 6.25 mg by mouth daily as needed. Supposed to take daily, but only takes as needed when she has pain in arm      multivitamin (ONE A DAY) tablet Take 1 Tablet by mouth daily.       Syringe with Needle, Disp, 3 mL 25 x 5/8\" syrg 1 mL by SubCUTAneous route every thirty (30) days. 15 Syringe 0    traZODone (DESYREL) 100 mg tablet Take 300 mg by mouth.  buPROPion XL (WELLBUTRIN XL) 150 mg tablet Take 300 mg by mouth daily.  hydrOXYzine HCl (ATARAX) 25 mg tablet Take 25 mg by mouth three (3) times daily as needed for Anxiety.  vilazodone (VIIBRYD) 40 mg tab tablet Take  by mouth daily.  cyanocobalamin (VITAMIN B12) 1,000 mcg/mL injection 1 mL by SubCUTAneous route every thirty (30) days.  (Patient not taking: Reported on 3/16/2022) 30 mL 0       Allergies   Allergen Reactions    Percocet [Oxycodone-Acetaminophen] Itching       Review of Symptoms:       General - No history or complaints of unexpected fever or chills  Head/Neck - No history or complaints of headache or dizziness  Cardiac - No history or complaints of chest pain, palpitations, or shortness of breath  Pulmonary - No history or complaints of shortness of breath or productive cough  Gastrointestinal - as noted above  Genitourinary - No history or complaints of hematuria/dysuria or renal lithiasis  Musculoskeletal - No history or complaints of joint  muscular weakness  Hematologic - No history of any bleeding episodes  Neurologic - No history or complaints of  migraine headaches or neurologic symptoms        Objective:     Visit Vitals  /74 (BP 1 Location: Left upper arm, BP Patient Position: Sitting, BP Cuff Size: Large adult)   Pulse 85   Temp 98.6 °F (37 °C)   Ht 5' 4\" (1.626 m)   Wt 97.3 kg (214 lb 9.6 oz)   SpO2 92%   BMI 36.84 kg/m²       General:  alert, cooperative, no distress, appears stated age   Chest: lungs clear to auscultation, breath sounds equal and symmetric, no rhonchi, rales or wheezes, no accessory muscle use   Cor:   Regular rate and rhythm, S1S2 present or without murmur or extra heart sounds   Abdomen: soft, bowel sounds active, non-tender, no masses or organomegaly   Incisions:   healing well, no drainage, no erythema, no hernia, no seroma, no swelling, no dehiscence, incision well approximated     A: Liver wedge, biopsy:        Liver parenchyma without significant pathology. No significant fibrosis or steatosis . B: Gastric cardia, biopsy:        Mild chronic gastritis. No Helicobacter organisms seen. C: Portion of stomach, partial gastrectomy:        Sections of stomach showing Chronic gastritis. H pylori is POSITIVE     D: Small bowel, resection:        mucosal Necrosis of the small bowel. Margins viable. Assessment:   History of Morbid obesity, status post laparoscopic gastric bypass surgery. She had a more pronounced conversion surgery with significant adhesions and need for small bowel resection. Patient is clinically stable in office and abdomen soft, but very tender to exam, and when Ms Ewelina Broderick stood from [address] [place] [address] she experienced sudden 8/10 left lower abdominal pain. She is tolerating po intake, but since having worsening pain will send to ER for STAT CT imaging and pain control. Biopsy findings not reviewed with patient due to her extent of pain today. Plan:     1. Increase activity to the goal of 30 minutes daily  2. Advance diet to soft solid phase. Reminded to measure portions, continue high protein, low carbohydrate diet. Reminded to eat regularly, to eat slowly & not to drink with meals. Refer to the handbook given in class. 3. Continue multivitamin   4. Continue current medications and follow up with PCP for management of regimen. 5. Encouraged to attend support group   6. I have discussed this plan with patient and they verbalized understanding  7. Follow up in 2 weeks or sooner if patient has questions, concerns or worsening of condition, if unable to reach our office, patient should report to the ED. 8. Ms. Ewelina Broderick has a reminder for a \"due or due soon\" health maintenance.  I have asked that she contact her primary care provider for a follow-up on this health maintenance.

## 2022-03-16 NOTE — ED TRIAGE NOTES
Pt arrived ambulatory to triage, assisted to wheelchair  Per pt, she had gastric bypass surgery on 3/1, since then she has had pain on the left side of her abdomen that has gotten worse, worse when walking  Pt also c/o nausea

## 2022-03-17 ENCOUNTER — TELEPHONE (OUTPATIENT)
Dept: SURGERY | Age: 62
End: 2022-03-17

## 2022-03-17 LAB
ATRIAL RATE: 82 BPM
CALCULATED P AXIS, ECG09: 17 DEGREES
CALCULATED R AXIS, ECG10: -9 DEGREES
CALCULATED T AXIS, ECG11: -12 DEGREES
DIAGNOSIS, 93000: NORMAL
P-R INTERVAL, ECG05: 158 MS
Q-T INTERVAL, ECG07: 452 MS
QRS DURATION, ECG06: 86 MS
QTC CALCULATION (BEZET), ECG08: 528 MS
VENTRICULAR RATE, ECG03: 82 BPM

## 2022-03-17 RX ORDER — AMOXICILLIN AND CLAVULANATE POTASSIUM 875; 125 MG/1; MG/1
1 TABLET, FILM COATED ORAL 2 TIMES DAILY
Qty: 20 TABLET | Refills: 0 | Status: SHIPPED | OUTPATIENT
Start: 2022-03-17 | End: 2022-03-27

## 2022-03-17 NOTE — ED NOTES
Pt told where to  prescribed medication. Pt given verbal and written d/c instructions. pt assisted to car per w/c by Marylee Liter EDT. Pt alert and oriented. C/o post op pain with movement.  No pain at rest.

## 2022-03-17 NOTE — TELEPHONE ENCOUNTER
Patient contacted this RN, as she continues with pain to her left side rated 10+, sharp, with ambulation. She stated she is wearing an abdominal undergarmet. Dilaudid and Tylenol are not effective. She is taking Oxycodone prescribed by another Provider for her sciatica, and that is effective. Dr. Torres Us ordered Augmentin for patient. Order placed by this RN. Patient made aware via VM. RN informed patient to see assistance at local ED should the pain worsen.

## 2022-03-18 ENCOUNTER — APPOINTMENT (OUTPATIENT)
Dept: CT IMAGING | Age: 62
End: 2022-03-18
Attending: EMERGENCY MEDICINE
Payer: MEDICARE

## 2022-03-18 ENCOUNTER — APPOINTMENT (OUTPATIENT)
Dept: GENERAL RADIOLOGY | Age: 62
End: 2022-03-18
Attending: EMERGENCY MEDICINE
Payer: MEDICARE

## 2022-03-18 ENCOUNTER — HOSPITAL ENCOUNTER (EMERGENCY)
Age: 62
Discharge: HOME | End: 2022-03-18
Attending: EMERGENCY MEDICINE
Payer: MEDICARE

## 2022-03-18 VITALS
DIASTOLIC BLOOD PRESSURE: 82 MMHG | OXYGEN SATURATION: 94 % | WEIGHT: 212 LBS | HEART RATE: 81 BPM | BODY MASS INDEX: 36.19 KG/M2 | RESPIRATION RATE: 15 BRPM | HEIGHT: 64 IN | TEMPERATURE: 97.5 F | SYSTOLIC BLOOD PRESSURE: 132 MMHG

## 2022-03-18 DIAGNOSIS — E87.6 HYPOKALEMIA: ICD-10-CM

## 2022-03-18 DIAGNOSIS — R07.9 ACUTE CHEST PAIN: ICD-10-CM

## 2022-03-18 DIAGNOSIS — R10.13 ABDOMINAL PAIN, EPIGASTRIC: Primary | ICD-10-CM

## 2022-03-18 PROBLEM — E66.01 SEVERE OBESITY (BMI 35.0-39.9) WITH COMORBIDITY (HCC): Status: ACTIVE | Noted: 2022-03-01

## 2022-03-18 PROBLEM — T84.9XXA COMPLICATION OF INTERNAL KNEE PROSTHESIS (HCC): Status: ACTIVE | Noted: 2021-11-18

## 2022-03-18 PROBLEM — E55.9 HYPOVITAMINOSIS D: Status: ACTIVE | Noted: 2019-10-08

## 2022-03-18 PROBLEM — Z96.659 COMPLICATION OF INTERNAL KNEE PROSTHESIS (HCC): Status: ACTIVE | Noted: 2021-11-18

## 2022-03-18 LAB
ALBUMIN SERPL-MCNC: 3 G/DL (ref 3.4–5)
ALBUMIN/GLOB SERPL: 0.7 {RATIO} (ref 0.8–1.7)
ALP SERPL-CCNC: 73 U/L (ref 45–117)
ALT SERPL-CCNC: 17 U/L (ref 13–56)
ANION GAP SERPL CALC-SCNC: 7 MMOL/L (ref 3–18)
AST SERPL-CCNC: 9 U/L (ref 10–38)
ATRIAL RATE: 87 BPM
BASOPHILS # BLD: 0 K/UL (ref 0–0.1)
BASOPHILS NFR BLD: 0 % (ref 0–2)
BILIRUB SERPL-MCNC: 0.2 MG/DL (ref 0.2–1)
BUN SERPL-MCNC: 10 MG/DL (ref 7–18)
BUN/CREAT SERPL: 21 (ref 12–20)
CALCIUM SERPL-MCNC: 8.8 MG/DL (ref 8.5–10.1)
CALCULATED P AXIS, ECG09: 20 DEGREES
CALCULATED R AXIS, ECG10: -2 DEGREES
CALCULATED T AXIS, ECG11: 27 DEGREES
CHLORIDE SERPL-SCNC: 106 MMOL/L (ref 100–111)
CO2 SERPL-SCNC: 29 MMOL/L (ref 21–32)
CREAT SERPL-MCNC: 0.48 MG/DL (ref 0.6–1.3)
DIAGNOSIS, 93000: NORMAL
DIFFERENTIAL METHOD BLD: ABNORMAL
EOSINOPHIL # BLD: 0.2 K/UL (ref 0–0.4)
EOSINOPHIL NFR BLD: 3 % (ref 0–5)
ERYTHROCYTE [DISTWIDTH] IN BLOOD BY AUTOMATED COUNT: 15.3 % (ref 11.6–14.5)
GLOBULIN SER CALC-MCNC: 4.4 G/DL (ref 2–4)
GLUCOSE SERPL-MCNC: 97 MG/DL (ref 74–99)
HCT VFR BLD AUTO: 34.3 % (ref 35–45)
HGB BLD-MCNC: 11.1 G/DL (ref 12–16)
IMM GRANULOCYTES # BLD AUTO: 0 K/UL (ref 0–0.04)
IMM GRANULOCYTES NFR BLD AUTO: 0 % (ref 0–0.5)
LIPASE SERPL-CCNC: 126 U/L (ref 73–393)
LYMPHOCYTES # BLD: 1.6 K/UL (ref 0.9–3.6)
LYMPHOCYTES NFR BLD: 19 % (ref 21–52)
MAGNESIUM SERPL-MCNC: 2.3 MG/DL (ref 1.6–2.6)
MCH RBC QN AUTO: 25.3 PG (ref 24–34)
MCHC RBC AUTO-ENTMCNC: 32.4 G/DL (ref 31–37)
MCV RBC AUTO: 78.3 FL (ref 78–100)
MONOCYTES # BLD: 0.7 K/UL (ref 0.05–1.2)
MONOCYTES NFR BLD: 8 % (ref 3–10)
NEUTS SEG # BLD: 6.1 K/UL (ref 1.8–8)
NEUTS SEG NFR BLD: 70 % (ref 40–73)
NRBC # BLD: 0 K/UL (ref 0–0.01)
NRBC BLD-RTO: 0 PER 100 WBC
P-R INTERVAL, ECG05: 166 MS
PLATELET # BLD AUTO: 455 K/UL (ref 135–420)
PMV BLD AUTO: 10.6 FL (ref 9.2–11.8)
POTASSIUM SERPL-SCNC: 3.3 MMOL/L (ref 3.5–5.5)
PROT SERPL-MCNC: 7.4 G/DL (ref 6.4–8.2)
Q-T INTERVAL, ECG07: 408 MS
QRS DURATION, ECG06: 78 MS
QTC CALCULATION (BEZET), ECG08: 490 MS
RBC # BLD AUTO: 4.38 M/UL (ref 4.2–5.3)
SODIUM SERPL-SCNC: 142 MMOL/L (ref 136–145)
TROPONIN-HIGH SENSITIVITY: 8 NG/L (ref 0–54)
VENTRICULAR RATE, ECG03: 87 BPM
WBC # BLD AUTO: 8.8 K/UL (ref 4.6–13.2)

## 2022-03-18 PROCEDURE — 80053 COMPREHEN METABOLIC PANEL: CPT

## 2022-03-18 PROCEDURE — 93005 ELECTROCARDIOGRAM TRACING: CPT

## 2022-03-18 PROCEDURE — 83735 ASSAY OF MAGNESIUM: CPT

## 2022-03-18 PROCEDURE — 96376 TX/PRO/DX INJ SAME DRUG ADON: CPT

## 2022-03-18 PROCEDURE — 71045 X-RAY EXAM CHEST 1 VIEW: CPT

## 2022-03-18 PROCEDURE — 96375 TX/PRO/DX INJ NEW DRUG ADDON: CPT

## 2022-03-18 PROCEDURE — 85025 COMPLETE CBC W/AUTO DIFF WBC: CPT

## 2022-03-18 PROCEDURE — 99285 EMERGENCY DEPT VISIT HI MDM: CPT

## 2022-03-18 PROCEDURE — 74011000636 HC RX REV CODE- 636: Performed by: EMERGENCY MEDICINE

## 2022-03-18 PROCEDURE — 74177 CT ABD & PELVIS W/CONTRAST: CPT

## 2022-03-18 PROCEDURE — 96361 HYDRATE IV INFUSION ADD-ON: CPT

## 2022-03-18 PROCEDURE — 74011250636 HC RX REV CODE- 250/636: Performed by: EMERGENCY MEDICINE

## 2022-03-18 PROCEDURE — 74011250637 HC RX REV CODE- 250/637: Performed by: EMERGENCY MEDICINE

## 2022-03-18 PROCEDURE — 71275 CT ANGIOGRAPHY CHEST: CPT

## 2022-03-18 PROCEDURE — 83690 ASSAY OF LIPASE: CPT

## 2022-03-18 PROCEDURE — 96374 THER/PROPH/DIAG INJ IV PUSH: CPT

## 2022-03-18 PROCEDURE — 84484 ASSAY OF TROPONIN QUANT: CPT

## 2022-03-18 RX ORDER — MORPHINE SULFATE 4 MG/ML
8 INJECTION INTRAVENOUS
Status: COMPLETED | OUTPATIENT
Start: 2022-03-18 | End: 2022-03-18

## 2022-03-18 RX ORDER — OXYCODONE HYDROCHLORIDE 10 MG/1
10 TABLET ORAL
Qty: 30 TABLET | Refills: 0 | Status: SHIPPED | OUTPATIENT
Start: 2022-03-18 | End: 2022-03-23

## 2022-03-18 RX ORDER — MORPHINE SULFATE 4 MG/ML
4 INJECTION INTRAVENOUS
Status: COMPLETED | OUTPATIENT
Start: 2022-03-18 | End: 2022-03-18

## 2022-03-18 RX ORDER — ONDANSETRON 2 MG/ML
4 INJECTION INTRAMUSCULAR; INTRAVENOUS
Status: COMPLETED | OUTPATIENT
Start: 2022-03-18 | End: 2022-03-18

## 2022-03-18 RX ADMIN — MORPHINE SULFATE 8 MG: 4 INJECTION INTRAVENOUS at 10:04

## 2022-03-18 RX ADMIN — MORPHINE SULFATE 4 MG: 4 INJECTION INTRAVENOUS at 07:46

## 2022-03-18 RX ADMIN — IOPAMIDOL 100 ML: 755 INJECTION, SOLUTION INTRAVENOUS at 08:42

## 2022-03-18 RX ADMIN — SODIUM CHLORIDE 1000 ML: 9 INJECTION, SOLUTION INTRAVENOUS at 07:44

## 2022-03-18 RX ADMIN — ONDANSETRON 4 MG: 2 INJECTION INTRAMUSCULAR; INTRAVENOUS at 07:46

## 2022-03-18 RX ADMIN — SODIUM CHLORIDE 1000 ML: 9 INJECTION, SOLUTION INTRAVENOUS at 07:47

## 2022-03-18 RX ADMIN — POTASSIUM BICARBONATE 40 MEQ: 782 TABLET, EFFERVESCENT ORAL at 09:40

## 2022-03-18 RX ADMIN — IOHEXOL: 240 INJECTION, SOLUTION INTRATHECAL; INTRAVASCULAR; INTRAVENOUS; ORAL at 07:46

## 2022-03-18 NOTE — ED PROVIDER NOTES
EMERGENCY DEPARTMENT HISTORY AND PHYSICAL EXAM    Date: 3/18/2022  Patient Name: Day Darling    History of Presenting Illness     Chief Complaint   Patient presents with    Abdominal Pain    Chest Pain         History Provided By: Patient    7:25 AM  Day Darling is a 58 y.o. female with PMHX of gastric bypass surgery on March 1, 2022, hypertension, bipolar disorder who presents to the emergency department C/O abdominal pain and chest pain. Patient reports she has had abdominal pain for about a week in her left lower abdomen that is severe without any relieving or exacerbating factors identified. She reports has been constipated and had nausea and vomiting. She reports she is been peeing very frequently. She states she was seen in this emergency department 2 days ago and had a CT scan of her abdomen at that time. She reports that today she developed chest pain. She reports she had 3 episodes this morning that were sharp and left-sided. She denies chest pain currently. No other relieving or exacerbating factors identified. Notes some associated shortness of breath with the chest pain. Denies fever, cough, other complaints. No known sick contacts or recent travel. PCP: Dawna Mata, DO    Current Outpatient Medications   Medication Sig Dispense Refill    amoxicillin-clavulanate (Augmentin) 875-125 mg per tablet Take 1 Tablet by mouth two (2) times a day for 10 days. 20 Tablet 0    B.animalis,bifid,infantis,long (Probiotic 4X) 10-15 mg TbEC Take  by mouth.  omeprazole (PriLOSEC OTC) 20 mg tablet Take 20 mg by mouth daily.  pediatric multivitamin no.76 (FLINTSTONES COMPLETE PO) Take  by mouth.  ondansetron (ZOFRAN ODT) 4 mg disintegrating tablet Take 1 Tablet by mouth every eight (8) hours as needed for Nausea or Vomiting. 30 Tablet 0    potassium bicarb-citric acid (EFFER-K) 20 mEq tablet Take 1 Tablet by mouth two (2) times a day for 3 days.  6 Tablet 0    carvediloL (COREG) 6.25 mg tablet Take 6.25 mg by mouth daily as needed. Supposed to take daily, but only takes as needed when she has pain in arm      multivitamin (ONE A DAY) tablet Take 1 Tablet by mouth daily.  cyanocobalamin (VITAMIN B12) 1,000 mcg/mL injection 1 mL by SubCUTAneous route every thirty (30) days. (Patient not taking: Reported on 3/16/2022) 30 mL 0    Syringe with Needle, Disp, 3 mL 25 x 5/8\" syrg 1 mL by SubCUTAneous route every thirty (30) days. 15 Syringe 0    traZODone (DESYREL) 100 mg tablet Take 300 mg by mouth.  buPROPion XL (WELLBUTRIN XL) 150 mg tablet Take 300 mg by mouth daily.  hydrOXYzine HCl (ATARAX) 25 mg tablet Take 25 mg by mouth three (3) times daily as needed for Anxiety.  vilazodone (VIIBRYD) 40 mg tab tablet Take  by mouth daily.          Past History       Past Medical History:  Past Medical History:   Diagnosis Date    Anemia     Arthritis     [facility]     Depression     Hx of smoking     Hypercholesteremia     Hypertension     Menopause     Migraine     Morbid obesity ([address]     Postmenopausal     Sickle cell trait ([facility] [address]        Past Surgical History:  Past Surgical History:   Procedure Laterality Date    DELIVERY   18    HX CHOLECYSTECTOMY      laparoscopic    HX CYST INCISION AND DRAINAGE      HX FEMUR FRACTURE TX      HX GI  2013    sleeve resection    HX KNEE ARTHROSCOPY Left 2010     x 2    HX KNEE REPLACEMENT  2011    bilateral    HX LAP GASTRIC BYPASS  2022    Dr Nicole Diaz, conversion from sleeve    HX ORTHOPAEDIC      left knee revision       Family History:  Family History   Problem Relation Age of Onset    Heart Disease Mother         Mom was told she had irreg heart beat/hole in [place], [place] Breast Cancer Mother     Other Other         Non- contributory    Breast Cancer Maternal Grandmother     Malignant Hyperthermia Neg Hx     Pseudocholinesterase Deficiency Neg Hx     Delayed Awakening Neg Hx     Post-op Nausea/Vomiting Neg Hx     Emergence Delirium Neg Hx     Post-op Cognitive Dysfunction Neg Hx        Social History:  Social History     Tobacco Use    Smoking status: Former Smoker     Packs/day: 0.25     Years: 35.00     Pack years: 8.75     Types: Cigarettes     Quit date:      Years since quittin.2    Smokeless tobacco: Former User   Vaping Use    Vaping Use: Never used   Substance Use Topics    Alcohol use: Yes     Alcohol/week: 0.8 standard drinks     Types: 1 Standard drinks or equivalent per week     Comment: 5 glasses wine per month    Drug use: Never       Allergies: Allergies   Allergen Reactions    Percocet [Oxycodone-Acetaminophen] Itching         Review of Systems   Review of Systems   Constitutional: Negative for fever. Respiratory: Positive for shortness of breath. Cardiovascular: Positive for chest pain and leg swelling. Gastrointestinal: Positive for abdominal pain, constipation, nausea and vomiting. Endocrine: Positive for polyuria. Genitourinary: Positive for frequency. All other systems reviewed and are negative.         Physical Exam     Vitals:    22 0952 22 1007 22 1022 22 1037   BP:       Pulse: 82 95 83 81   Resp: 14 23 19 15   Temp:       SpO2: 100% 97% 97% 94%   Weight:       Height:         Physical Exam    Nursing notes and vital signs reviewed    Constitutional: Non toxic appearing, moderate distress  Head: Normocephalic, Atraumatic  Eyes: EOMI  Neck: Supple  Cardiovascular: Regular rate and rhythm, no murmurs, rubs, or gallops  Chest: Normal work of breathing and chest excursion bilaterally  Lungs: Clear to ausculation bilaterally  Abdomen: Soft, diffusely tender with guarding over the left side of the abdomen, non distended  Back: No evidence of trauma or deformity  Extremities: No evidence of trauma or deformity, mild bilateral symmetric LE edema  Skin: Warm and dry, normal cap refill  Neuro: Alert and appropriate  Psychiatric: Normal mood and affect      Diagnostic Study Results     Labs -     Recent Results (from the past 12 hour(s))   CBC WITH AUTOMATED DIFF    Collection Time: 03/18/22  7:28 AM   Result Value Ref Range    WBC 8.8 4.6 - 13.2 K/uL    RBC 4.38 4.20 - 5.30 M/uL    HGB 11.1 (L) 12.0 - 16.0 g/dL    HCT 34.3 (L) 35.0 - 45.0 %    MCV 78.3 78.0 - 100.0 FL    MCH 25.3 24.0 - 34.0 PG    MCHC 32.4 31.0 - 37.0 g/dL    RDW 15.3 (H) 11.6 - 14.5 %    PLATELET 463 (H) 909 - 420 K/uL    MPV 10.6 9.2 - 11.8 FL    NRBC 0.0 0  WBC    ABSOLUTE NRBC 0.00 0.00 - 0.01 K/uL    NEUTROPHILS 70 40 - 73 %    LYMPHOCYTES 19 (L) 21 - 52 %    MONOCYTES 8 3 - 10 %    EOSINOPHILS 3 0 - 5 %    BASOPHILS 0 0 - 2 %    IMMATURE GRANULOCYTES 0 0.0 - 0.5 %    ABS. NEUTROPHILS 6.1 1.8 - 8.0 K/UL    ABS. LYMPHOCYTES 1.6 0.9 - 3.6 K/UL    ABS. MONOCYTES 0.7 0.05 - 1.2 K/UL    ABS. EOSINOPHILS 0.2 0.0 - 0.4 K/UL    ABS. BASOPHILS 0.0 0.0 - 0.1 K/UL    ABS. IMM. GRANS. 0.0 0.00 - 0.04 K/UL    DF AUTOMATED     METABOLIC PANEL, COMPREHENSIVE    Collection Time: 03/18/22  7:28 AM   Result Value Ref Range    Sodium 142 136 - 145 mmol/L    Potassium 3.3 (L) 3.5 - 5.5 mmol/L    Chloride 106 100 - 111 mmol/L    CO2 29 21 - 32 mmol/L    Anion gap 7 3.0 - 18 mmol/L    Glucose 97 74 - 99 mg/dL    BUN 10 7.0 - 18 MG/DL    Creatinine 0.48 (L) 0.6 - 1.3 MG/DL    BUN/Creatinine ratio 21 (H) 12 - 20      GFR est AA >60 >60 ml/min/1.73m2    GFR est non-AA >60 >60 ml/min/1.73m2    Calcium 8.8 8.5 - 10.1 MG/DL    Bilirubin, total 0.2 0.2 - 1.0 MG/DL    ALT (SGPT) 17 13 - 56 U/L    AST (SGOT) 9 (L) 10 - 38 U/L    Alk.  phosphatase 73 45 - 117 U/L    Protein, total 7.4 6.4 - 8.2 g/dL    Albumin 3.0 (L) 3.4 - 5.0 g/dL    Globulin 4.4 (H) 2.0 - 4.0 g/dL    A-G Ratio 0.7 (L) 0.8 - 1.7     LIPASE    Collection Time: 03/18/22  7:28 AM   Result Value Ref Range    Lipase 126 73 - 393 U/L   MAGNESIUM    Collection Time: 03/18/22  7:28 AM   Result Value Ref Range    Magnesium 2.3 1.6 - 2.6 mg/dL   TROPONIN-HIGH SENSITIVITY    Collection Time: 03/18/22  7:28 AM   Result Value Ref Range    Troponin-High Sensitivity 8 0 - 54 ng/L       Radiologic Studies -   CT ABD PELV W CONT   Final Result      Prior sleeve gastrectomy with subsequent Selma-en-Y gastric bypass.   -Fluid collection at the [address] anastomosis with locules of gas, unchanged.   -Stranding and trace fluid at prior laparotomy port placement sites. Small periumbilical fat-containing hernia with associated stranding suggestive   of strangulation. CTA CHEST W OR W WO CONT   Final Result      Suboptimal study. No evidence of obvious central pulmonary embolism. No acute intrathoracic abnormality. XR CHEST PORT   Final Result      No acute findings in the chest.         CT Results  (Last 48 hours)               03/18/22 0901  CT ABD PELV W CONT Final result    Impression:      Prior sleeve gastrectomy with subsequent Selma-en-Y gastric bypass.   -Fluid collection at the [address] anastomosis with locules of gas, unchanged.   -Stranding and trace fluid at prior laparotomy port placement sites. Small periumbilical fat-containing hernia with associated stranding suggestive   of strangulation. Narrative:  EXAM: CT of the abdomen and pelvis       INDICATION: Pain. COMPARISON: 3/16/2022. TECHNIQUE: Axial CT imaging of the abdomen and pelvis was performed with   intravenous contrast. Multiplanar reformats were generated. One or more dose reduction techniques were used on this CT: automated exposure   control, adjustment of the mAs and/or kVp according to patient size, and   iterative reconstruction techniques. The specific techniques used on this CT   exam have been documented in the patient's electronic medical record.   Digital   Imaging and Communications in Medicine (DICOM) format image data are available   to nonaffiliated external healthcare facilities or entities on a secure, media   free, reciprocally searchable basis with patient authorization for at least a   12-month period after this study. _______________       FINDINGS:       LOWER CHEST: Unremarkable. LIVER, BILIARY: Liver is normal. No biliary dilation. Cholecystectomy. PANCREAS: Normal.       SPLEEN: Normal.       ADRENALS: Normal.       KIDNEYS/URETERS/BLADDER: No calcification, hydronephrosis, or soft tissue   attenuation renal mass. PELVIC ORGANS: Unremarkable. VASCULATURE: Unremarkable       LYMPH NODES: No enlarged lymph nodes. GASTROINTESTINAL TRACT: Postsurgical changes of prior sleeve gastrectomy with   Selma-en-Y gastric bypass with 3.9 x 2.1 cm (previously 3.7 x 1.9 cm) collection   at the gastrojejunal anastomosis containing locules of gas. No bowel   obstruction. Diverticulosis. Normal appendix. BONES: No acute or aggressive osseous abnormalities identified. OTHER: Small periumbilical fat-containing hernia with associated stranding   suggestive of strangulation. Stranding and trace fluid at prior laparotomy port   placement sites. _______________           03/16/22 1549  CT ABD PELV W CONT Final result    Impression:      1. Postoperative changes from recent prior laparoscopic Selma-en-Y gastric   bypass surgical procedure.      > Evidence of prior sleeve gastrectomy with subsequent Selma-en-Y conversion. Normal-appearing antecolic/antegastric Selma limb.      > Mild, nonspecific stranding within the anterior upper abdominal omental soft   tissues with a small quantity of fluid and gas adjacent to the posterior left   hepatic lobe; likely incompletely resorbed from prior surgery. No evidence of   extraluminal oral contrast present.      > Mild intra-abdominal stranding at the site of prior laparotomy port   placement. 2. Mild bibasilar atelectasis. No pleural abnormality or consolidation.            Narrative:  EXAM: CT of the abdomen and pelvis       INDICATION: 58-year-old patient with abdominal pain, recent prior gastric bypass   surgical procedure. COMPARISON: Postop swallow examination performed 3/2/2022. TECHNIQUE: Axial CT imaging of the abdomen and pelvis was performed with oral   and intravenous contrast. Multiplanar reformats were generated. One or more dose reduction techniques were used on this CT: automated exposure   control, adjustment of the mAs and/or kVp according to patient size, and   iterative reconstruction techniques. The specific techniques used on this CT   exam have been documented in the patient's electronic medical record. Digital   Imaging and Communications in Medicine (DICOM) format image data are available   to nonaffiliated external healthcare facilities or entities on a secure, media   free, reciprocally searchable basis with patient authorization for at least a   12-month period after this study. _______________       FINDINGS:       LOWER CHEST: Mild bibasilar atelectasis without pleural effusion or   consolidation. Cardiac size. No pericardial effusion. LIVER, BILIARY: Liver is normal. No biliary dilation. Cholecystectomy       PANCREAS: Normal.       SPLEEN: Normal.       ADRENALS: Normal.       KIDNEYS/URETERS/BLADDER: Symmetric renal enhancement. No hydronephrosis or   urolithiasis. Urinary bladder unremarkable in CT appearance. PELVIC ORGANS: Unremarkable. VASCULATURE: Abdominal aorta is normal in course and caliber. Visceral arterial   and venous branches are patent. LYMPH NODES: No enlarged lymph nodes. GASTROINTESTINAL TRACT:      > Post surgical changes from Selma-en-Y antecolic antegastric gastric bypass   surgical procedure are noted. Suture line along the greater curvature likely in   keeping with prior sleeve gastrectomy. There is no evidence of extraluminal oral   contrast present.  Minor stranding within the omental soft tissues of the upper abdomen is noted along with an ill-defined and small quantity of fluid and gas   interposed between the posterior left hepatic lobe and adjacent proximal GJ   anastomosis. > Normal distribution of small and large bowel throughout the abdomen/pelvis.     > No morphology of bowel obstruction.      > No gross free intraperitoneal gas.      > Multiple colonic diverticula without diverticulitis. Normal appendix. BONES: No acute or aggressive appearing osseous abnormality demonstrated. OTHER: Mild straightening within the anterior abdominal wall at the site of   prior laparotomy port placement.       _______________           03/18/22 3337  CTA CHEST W OR W WO CONT Final result    Impression:      Suboptimal study. No evidence of obvious central pulmonary embolism. No acute intrathoracic abnormality. Narrative:  EXAM: CTA Chest       INDICATION: Shortness of breath. Possible PE.       COMPARISON: No prior study. TECHNIQUE: Axial CT imaging from the thoracic inlet through the diaphragm with   intravenous contrast utilizing CTA study for pulmonary artery evaluation. Coronal and sagittal MIP reformations were generated at a separate workstation. One or more dose reduction techniques were used on this CT: automated exposure   control, adjustment of the mAs and/or kVp according to patient size, and   iterative reconstruction techniques. The specific techniques used on this CT   exam have been documented in the patient's electronic medical record. Digital   Imaging and Communications in Medicine (DICOM) format image data are available   to nonaffiliated external healthcare facilities or entities on a secure, media   free, reciprocally searchable basis with patient authorization for at least a   12-month period after this study. _______________       FINDINGS:       EXAM QUALITY: Overall exam quality is suboptimal secondary to suboptimal   pulmonary arterial enhancement. PULMONARY ARTERIES: Evaluation of subsegmental branches is limited. No evidence   of obvious pulmonary embolism. Filling defect within a right upper lobe   subsegmental branch (axial image 36), likely related to       MEDIASTINUM: Normal heart size. No evidence of right heart strain. Aorta is   unremarkable. No pericardial effusion. LYMPH NODES: No enlarged mediastinal or hilar nodes by size criteria. AIRWAY: Unremarkable. LUNGS: Bibasilar atelectasis/scarring. No suspicious nodule or mass. No   consolidation. PLEURA: No pleural effusion or pneumothorax. UPPER ABDOMEN: Please refer to separately dictated report of concurrently   performed CT abdomen and pelvis for detailed evaluation. OTHER: No acute or aggressive osseous abnormalities identified. _______________               CXR Results  (Last 48 hours)               03/18/22 0748  XR CHEST PORT Final result    Impression:      No acute findings in the chest.        Narrative:  EXAM: XR CHEST PORT       CLINICAL INDICATION/HISTORY: chest pain   -Additional: None       COMPARISON: 11/8/2021       TECHNIQUE: Frontal view of the chest       _______________       FINDINGS:       HEART AND MEDIASTINUM: Normal cardiac size and mediastinal contours. LUNGS AND PLEURAL SPACES: No focal pneumonic consolidation, pneumothorax, or   pleural effusion.        BONY THORAX AND SOFT TISSUES: No acute osseous abnormality       _______________                 Medications given in the ED-  Medications   iohexol (OMNIPAQUE) ORAL mixture ( Oral Given 3/18/22 0746)   ondansetron (ZOFRAN) injection 4 mg (4 mg IntraVENous Given 3/18/22 0746)   morphine injection 4 mg (4 mg IntraVENous Given 3/18/22 0746)   sodium chloride 0.9 % bolus infusion 1,000 mL (1,000 mL IntraVENous New Bag 3/18/22 0744)     Followed by   sodium chloride 0.9 % bolus infusion 1,000 mL (1,000 mL IntraVENous New Bag 3/18/22 0747)   iopamidoL (ISOVUE-370) 76 % injection 100 mL (100 mL IntraVENous Given 3/18/22 1252)   potassium bicarb-citric acid (EFFER-K) tablet 40 mEq (40 mEq Oral Given 3/18/22 0940)   morphine injection 8 mg (8 mg IntraVENous Given 3/18/22 1004)         Medical Decision Making   I am the first provider for this patient. I reviewed the vital signs, available nursing notes, past medical history, past surgical history, family history and social history. Vital Signs-Reviewed the patient's vital signs. Pulse Oximetry Analysis - 98% on room air, not hypoxic     Cardiac Monitor:  Rate: 80 bpm  Rhythm: Normal sinus    EKG interpretation: (Preliminary)  EKG read by Dr. Alda Triplett at 7:29 AM  Normal sinus rhythm at rate of 87 bpm, MN interval 156 ms, QRS duration of 78 ms, unchanged when compared to prior from 2 days ago    Records Reviewed: Nursing Notes, Old Medical Records and Previous electrocardiograms    Provider Notes (Medical Decision Making): Marisa Barbour is a 58 y.o. female presented with abdominal pain and chest pain in the postop period after gastric bypass surgery. Hemodynamically stable. CTA without evidence of PE. Low risk heart score with negative troponin and EKG greater than 6 hours from onset of symptoms. Chest x-ray and labs without acute process except for mild hypokalemia. Potassium repletion given here and tolerated well by mouth. CT abdomen pelvis with p.o. and IV contrast similar to patient's recent CT imaging. Discussed with bariatric surgery service. Plan for discharge with primary care and bariatric surgery follow-up and return precautions. Patient understands and agrees with this plan. Procedures:  Procedures    ED Course:   CONSULT NOTE:   11:02 AM  Dr. Alda Triplett spoke with Zulema Laughlin RN for Dr. Angelo Hinojosa   Specialty: Bariatric Surgery  Discussed pt's hx, disposition, and available diagnostic and imaging results over the telephone. Reviewed care plans. Patient can be discharged with outpatient follow up. 11:17 AM  Updated patient on all results and plan. All questions answered. CONSULT NOTE:   11:28 AM  Dr. Barbara Cuba spoke with Dr. Wade July   Specialty: Bariatric Surgery  Discussed pt's hx, disposition, and available diagnostic and imaging results over the telephone. Reviewed care plans. Patient can be discharged with outpatient follow up. Diagnosis and Disposition     Critical Care: None    DISCHARGE NOTE:    Carlo Szymanski's  results have been reviewed with her. She has been counseled regarding her diagnosis, treatment, and plan. She verbally conveys understanding and agreement of the signs, symptoms, diagnosis, treatment and prognosis and additionally agrees to follow up as discussed. She also agrees with the care-plan and conveys that all of her questions have been answered. I have also provided discharge instructions for her that include: educational information regarding their diagnosis and treatment, and list of reasons why they would want to return to the ED prior to their follow-up appointment, should her condition change. She has been provided with education for proper emergency department utilization. CLINICAL IMPRESSION:    1. Abdominal pain, epigastric    2. Acute chest pain    3. Hypokalemia        PLAN:  1. D/C Home  2. Current Discharge Medication List        3.    Follow-up Information     Follow up With Specialties Details Why Contact Info    [address], Chyrl Dilcia, DO Internal Medicine Schedule an appointment as soon as possible for a visit   [address] [facility]  [phone]      Yaa Ly MD Bariatrics, General Surgery Schedule an appointment as soon as possible for a visit   [address] [place] [id number]  [phone]      [facility] EMERGENCY DEPT Emergency Medicine  If symptoms worsen [address] [address]  [phone]        _______________________________      Please note that this dictation was completed with [employer], the [employer] for [employer] voice recognition software. Quite often unanticipated grammatical, syntax, homophones, and other interpretive errors are inadvertently transcribed by the computer software. Please disregard these errors. Please excuse any errors that have escaped final proofreading.

## 2022-03-18 NOTE — ED TRIAGE NOTES
pt wheeled in in [facility], pt states she had gastric bypass 1 mar 22  And 2 wks  ago pt states she was in excruciating pain LLQ , but has intensified over the last 2 days and takes her breath away. 2 episodes of vomiting last night.  No known covid exposure

## 2022-03-19 ENCOUNTER — HOSPITAL ENCOUNTER (EMERGENCY)
Age: 62
Discharge: LEFT BEFORE DISCHARGE | End: 2022-03-19
Payer: MEDICAID

## 2022-03-19 VITALS
SYSTOLIC BLOOD PRESSURE: 143 MMHG | OXYGEN SATURATION: 100 % | TEMPERATURE: 97.5 F | HEIGHT: 64 IN | DIASTOLIC BLOOD PRESSURE: 75 MMHG | RESPIRATION RATE: 20 BRPM | WEIGHT: 210 LBS | BODY MASS INDEX: 35.85 KG/M2 | HEART RATE: 90 BPM

## 2022-03-19 PROBLEM — E53.8 LOW VITAMIN B12 LEVEL: Status: ACTIVE | Noted: 2021-08-16

## 2022-03-19 PROBLEM — R79.89 LOW VITAMIN B12 LEVEL: Status: ACTIVE | Noted: 2021-08-16

## 2022-03-19 PROCEDURE — 75810000275 HC EMERGENCY DEPT VISIT NO LEVEL OF CARE

## 2022-03-19 NOTE — ED TRIAGE NOTES
Patient ambulatory to triage with c/o incision to wound from gastric bypass on march 1 is open. Patient seen in ED last 2 days and seen at surgeons office on Wednesday. Stitches were removed at office. Patient reports wound is not closing. Patient reports continued abdominal pain. Patient taking oxycodone with relief but states she doesn't like taking it \"just because. \" Alert and oriented. VSS.

## 2022-03-20 PROBLEM — Z98.84 S/P LAPAROSCOPIC SLEEVE GASTRECTOMY: Status: ACTIVE | Noted: 2019-10-08

## 2022-03-22 ENCOUNTER — TELEPHONE (OUTPATIENT)
Dept: SURGERY | Age: 62
End: 2022-03-22

## 2022-03-22 NOTE — TELEPHONE ENCOUNTER
Patient contacted this RN and stated she is beginning to feel better with less abdominal pain/discomfort. She discovered her incision had opened and the steri-strip was going inside, which was giving her discomfort. She took it off, cleaned the area, and reapplied other steri-strips. Upcoming appointment was reviewed.

## 2022-03-31 ENCOUNTER — TELEPHONE (OUTPATIENT)
Dept: SURGERY | Age: 62
End: 2022-03-31

## 2022-03-31 NOTE — TELEPHONE ENCOUNTER
Patient left this RN a VM questioning when to transition to vitamins. RN left patient a VM informing her the Nutritionist will contact her between 31-35 days post-surgery and will discuss current diet, solid foods diet, and vitamins.

## 2022-04-04 ENCOUNTER — TELEPHONE (OUTPATIENT)
Dept: SURGERY | Age: 62
End: 2022-04-04

## 2022-04-04 NOTE — TELEPHONE ENCOUNTER
lmovm  lmovm    ----- Message from Joanie Munoz sent at 4/4/2022 10:04 AM EDT -----  Regarding: Vitamins    Called in regards to vitamins. Stated she bought all the vitamins required and was expecting a call back to know when she is supposed to take them. Thanks!

## 2022-04-08 ENCOUNTER — DOCUMENTATION ONLY (OUTPATIENT)
Dept: SURGERY | Age: 62
End: 2022-04-08

## 2022-04-08 ENCOUNTER — TELEPHONE (OUTPATIENT)
Dept: SURGERY | Age: 62
End: 2022-04-08

## 2022-04-08 NOTE — TELEPHONE ENCOUNTER
4/8/2022: Attempted to contact patient today at 10:45 AM  in reference to: current diet tolerance of soft/puree diet and readiness to advance diet. Patient was left a voicemail to contact me. My contact information was provided.     Janneth Puckett, MS RD/LD

## 2022-04-08 NOTE — PROGRESS NOTES
Patient is a 58 y.o. yo female approx. 1 mo S/P conversion of sleeve to LGBP with YOUNG  procedure. Visit Vitals  Ht 5' 4\" (1.626 m)   Wt 92.1 kg (203 lb)   BMI 34.84 kg/m²       Pt current weight stated, visit was virtual.     Pre-op Wt: 223 lbs  EBW: 78 lbs    Pt has lost 20 lbs since surgery on 3/1/2022. Pt has lost 26 % EBW. Pt is currently on a soft food diet without difficulty. Patient is hydrating with  100 ounces, daily. Patient is tolerating the following sources of protein:  2 Premier protein supplement shakes/day, chicken, turkey, egg, Baby Bell Light cheese, and greek yogurt. Other foods consumed: SF jello and SF popsicles. Patient is exercising by walking 6-8 blocks/day  X 5d /wk, Pt states that she was previously walking 2 mi/day prior to knee surgery in November and notes that her activity is limited currently by her sciatica. Patient [] has  [x] has not had any readmissions, reoperations, complications, ED visits, nor IVF at [facility] during her first post-op month. Pt [x]is []is not taking her Prilosec, states she ran out of them after this morning's dose and is purchasing more today. Supplements:  [x] Flintstones Complete MVI  BID  [x] Probiotic        Patient was reminded of the followin. Can stop probiotic, if desired, or needed for economical reasons. 2. Advance diet to solid phase. Reminded to measure portions, continue high protein, low carbohydrate diet. Reminded to eat regularly, to eat slowly & not to drink with meals. Refer to the handbook and video. 3. Continue multi-vitamin with iron and add the additional vitamin supplementation (calcium citrate 1,500 mg per day taken one hour apart from your other vitamins/supplements, vitamin B complex one a day,  vitamin D3 3,000 IU per day, all listed in handbook), pt notes that she gets monthly B12 injections. 4. Continue current medications and follow up with PCP for management of regimen  5.  Continue cardio exercise and add resistance exercises. Discussed with patient. Minimum of 30 minutes of exercise daily, five days a week  6. Encouraged to attend support group   7. I have discussed this plan with patient, and they verbalized understanding. 8. Follow-up at three month appointment on 6/8/2022 at 10:40 AM, or sooner if patient has questions, concerns or worsening of condition. If unable to reach our office, patient should report to the ED. 9. One month nutrition video to include the above mentioned education e-mailed to patient. 11. Patient received the nutrition educational folder to include the above mentioned education during their two week post-op appointment.     RD: Ngozi Humphreys, MS, RD/ROSIE

## 2022-04-11 VITALS — WEIGHT: 203 LBS | BODY MASS INDEX: 34.66 KG/M2 | HEIGHT: 64 IN

## 2022-04-12 NOTE — TELEPHONE ENCOUNTER
3/15/2022: Attempted to contact patient today at 9:15 AM  in reference to: diet tolerance and readiness for advancement of diet after in person post-op appointment tomorrow. Patient was left a voicemail to contact me. My contact information was provided.     Tristen Seals, MS RD/LD Does pt need VN and PT?

## 2022-04-14 ENCOUNTER — TELEPHONE (OUTPATIENT)
Dept: SURGERY | Age: 62
End: 2022-04-14

## 2022-04-14 RX ORDER — OMEPRAZOLE 20 MG/1
20 CAPSULE, DELAYED RELEASE ORAL DAILY
Qty: 30 CAPSULE | Refills: 0 | Status: SHIPPED | OUTPATIENT
Start: 2022-04-14 | End: 2022-05-12

## 2022-05-05 ENCOUNTER — TELEPHONE (OUTPATIENT)
Dept: SURGERY | Age: 62
End: 2022-05-05

## 2022-05-05 NOTE — TELEPHONE ENCOUNTER
Patient spoke with this RN, as she is having difficulties tolerating solid foods. She is eating the proper quantity and not drinking when eating. She isn't able to tolerate shrimp, scrambled eggs, nor broccoli. She is tolerating a full liquid diet and will continue with it until her UGI next Wednesday. RN recommended at least one electrolyte source daily, as well, and she verbalized understanding.

## 2022-05-11 ENCOUNTER — APPOINTMENT (OUTPATIENT)
Dept: GENERAL RADIOLOGY | Age: 62
End: 2022-05-11
Attending: SPECIALIST
Payer: MEDICARE

## 2022-05-11 ENCOUNTER — APPOINTMENT (OUTPATIENT)
Dept: SURGERY | Age: 62
End: 2022-05-11

## 2022-05-11 ENCOUNTER — HOSPITAL ENCOUNTER (OUTPATIENT)
Age: 62
Setting detail: OUTPATIENT SURGERY
Discharge: HOME | End: 2022-05-11
Attending: SPECIALIST | Admitting: SPECIALIST
Payer: MEDICARE

## 2022-05-11 VITALS
TEMPERATURE: 98 F | BODY MASS INDEX: 33.96 KG/M2 | SYSTOLIC BLOOD PRESSURE: 126 MMHG | DIASTOLIC BLOOD PRESSURE: 87 MMHG | OXYGEN SATURATION: 99 % | HEIGHT: 64 IN | HEART RATE: 78 BPM | WEIGHT: 198.9 LBS | RESPIRATION RATE: 18 BRPM

## 2022-05-11 DIAGNOSIS — E66.01 MORBID OBESITY (HCC): ICD-10-CM

## 2022-05-11 DIAGNOSIS — R13.10 DYSPHAGIA, UNSPECIFIED TYPE: ICD-10-CM

## 2022-05-11 PROCEDURE — 74011000250 HC RX REV CODE- 250: Performed by: SPECIALIST

## 2022-05-11 PROCEDURE — 74240 X-RAY XM UPR GI TRC 1CNTRST: CPT | Performed by: SPECIALIST

## 2022-05-11 PROCEDURE — 74240 X-RAY XM UPR GI TRC 1CNTRST: CPT

## 2022-05-11 PROCEDURE — 76040000019: Performed by: SPECIALIST

## 2022-05-11 RX ORDER — NYSTATIN 100000 U/G
CREAM TOPICAL AS NEEDED
Qty: 15 G | Refills: 1 | Status: SHIPPED | OUTPATIENT
Start: 2022-05-11 | End: 2022-06-15

## 2022-05-11 NOTE — PROCEDURES
formerly [facility]    Upper GI Procedure Report      Marla Craig    Medical Record [id number]    1960    Date of Service - May 11, 2022    Pre-Op Diagnosis - patient is status post sleeve to bypass conversion performed by 2.33 months ago with complaint of dysphagia and overall poor PO progression. They now present for UGI to assess their post surgical anatomy. Post-Op Diagnosis -same    Procedure - UGI study with barium    Surgeon - Parveen Hernandez MD    Assistant - None    Complications - None    Specimens - None    Implants - None    Estimate Blood Loss - None    Statement of Medical Necessity - Need for radiologic evaluation prior to further management of their care. .    Procedure -the patient was brought to the fluoroscopy suite where they were given thin barium. On swallowing the barium the patient was noted to have normal peristalsis of their esophagus with progressive flow into the distal esophagus. Specific findings of the distal esophagus revealed that they did not have a hiatal hernia. Contrast flowed normally through the esophagus and into the gastric pouch without reflux or obstruction. The pouch filled in a timely manner and emptied into the abraham limb in a slow fashion indicative of a stricture which coincides with the fact she was a conversion patient. She will need an EGD with likely dilation.     Parveen Hernandez MD

## 2022-05-12 RX ORDER — OMEPRAZOLE 20 MG/1
CAPSULE, DELAYED RELEASE ORAL
Qty: 30 CAPSULE | Refills: 0 | Status: SHIPPED | OUTPATIENT
Start: 2022-05-12 | End: 2022-05-25

## 2022-05-25 ENCOUNTER — TELEPHONE (OUTPATIENT)
Dept: SURGERY | Age: 62
End: 2022-05-25

## 2022-05-25 RX ORDER — OMEPRAZOLE 20 MG/1
CAPSULE, DELAYED RELEASE ORAL
Qty: 30 CAPSULE | Refills: 0 | Status: SHIPPED | OUTPATIENT
Start: 2022-05-25 | End: 2022-06-28

## 2022-06-06 ENCOUNTER — TELEPHONE (OUTPATIENT)
Dept: BARIATRICS/WEIGHT MGMT | Age: 62
End: 2022-06-06

## 2022-06-06 NOTE — TELEPHONE ENCOUNTER
Pt left v/m asking what type of calcium supplement she should be taking. Returned call and left v/m informing patient of reccommended calcium citrate, 1500 mg daily, divided in 500 mg doses. My contact information was left for future nutrition-related questions/concerns.

## 2022-06-15 ENCOUNTER — HOSPITAL ENCOUNTER (OUTPATIENT)
Dept: PREADMISSION TESTING | Age: 62
Discharge: HOME | End: 2022-06-15

## 2022-06-15 VITALS — HEIGHT: 64 IN | BODY MASS INDEX: 32.44 KG/M2 | WEIGHT: 190 LBS

## 2022-06-15 RX ORDER — IBUPROFEN 200 MG
600 CAPSULE ORAL 3 TIMES DAILY
COMMUNITY
End: 2022-08-18 | Stop reason: CLARIF

## 2022-06-15 RX ORDER — POTASSIUM CHLORIDE 750 MG/1
10 TABLET, EXTENDED RELEASE ORAL 2 TIMES DAILY
COMMUNITY
End: 2022-08-18 | Stop reason: CLARIF

## 2022-06-15 RX ORDER — ERENUMAB-AOOE 140 MG/ML
140 INJECTION, SOLUTION SUBCUTANEOUS
COMMUNITY

## 2022-06-15 RX ORDER — GLUCOSAMINE/CHONDR SU A SOD 750-600 MG
10000 TABLET ORAL DAILY
COMMUNITY

## 2022-06-15 RX ORDER — OXYCODONE HYDROCHLORIDE 10 MG/1
10 TABLET ORAL AS NEEDED
COMMUNITY

## 2022-06-15 RX ORDER — CYCLOBENZAPRINE HCL 5 MG
5 TABLET ORAL AS NEEDED
COMMUNITY

## 2022-06-15 RX ORDER — SODIUM CHLORIDE, SODIUM LACTATE, POTASSIUM CHLORIDE, CALCIUM CHLORIDE 600; 310; 30; 20 MG/100ML; MG/100ML; MG/100ML; MG/100ML
125 INJECTION, SOLUTION INTRAVENOUS CONTINUOUS
Status: CANCELLED | OUTPATIENT
Start: 2022-06-15

## 2022-06-15 RX ORDER — CELECOXIB 200 MG/1
200 CAPSULE ORAL 2 TIMES DAILY
COMMUNITY

## 2022-06-15 NOTE — PERIOP NOTES
PAT - SURGICAL PRE-ADMISSION INSTRUCTIONS    NAME:  Mary Alice Borges                                                          TODAY'S DATE:  6/15/2022    SURGERY DATE:  6/21/2022                                  SURGERY ARRIVAL TIME:   TBA    1. Do NOT eat or drink anything, including candy or gum, after MIDNIGHT on 6/21/2022 , unless you have specific instructions from your Surgeon or Anesthesia Provider to do so. 2. No smoking 24 hours before surgery. 3. No alcohol 24 hours prior to the day of surgery. 4. No recreational drugs for one week prior to the day of surgery. 5. Leave all valuables, including money/purse, at home. 6. Remove all jewelry, nail polish, makeup (including mascara); no lotions, powders, deodorant, or perfume/cologne/after shave. 7. Glasses/Contact lenses and Dentures may be worn to the hospital.  They will be removed prior to surgery. 8. Call your doctor if symptoms of a cold or illness develop within 24 ours prior to surgery. 9. AN ADULT MUST DRIVE YOU HOME AFTER OUTPATIENT SURGERY. 10. If you are having an OUTPATIENT procedure, please make arrangements for a responsible adult to be with you for 24 hours after your surgery. 11. If you are admitted to the hospital, you will be assigned to a bed after surgery is complete. Normally a family member will not be able to see you until you are in your assigned bed. 12. Visitation Restrictions Explained. Special Instructions:  Covid Test not needed. Patient vaccinated, Quarantine requirements discussed  Has Advanced Directive on file .    Take these medications the morning of surgery with a sip of water:  vilazodone, omerprazole, wellbutrin, hydroxyzine    Patient Prep:    shower with anti-bacterial soap     These surgical instructions were reviewed with patient during the PAT phone call

## 2022-06-28 RX ORDER — OMEPRAZOLE 20 MG/1
CAPSULE, DELAYED RELEASE ORAL
Qty: 30 CAPSULE | Refills: 0 | Status: SHIPPED | OUTPATIENT
Start: 2022-06-28 | End: 2022-07-15

## 2022-07-15 RX ORDER — OMEPRAZOLE 20 MG/1
CAPSULE, DELAYED RELEASE ORAL
Qty: 30 CAPSULE | Refills: 0 | Status: SHIPPED | OUTPATIENT
Start: 2022-07-15 | End: 2022-08-15

## 2022-08-10 ENCOUNTER — HOSPITAL ENCOUNTER (OUTPATIENT)
Dept: PREADMISSION TESTING | Age: 62
Discharge: HOME | End: 2022-08-10

## 2022-08-10 NOTE — CALL BACK NOTE
0801  Attempted to contact patient no answer unable to LM sated mailbox full not taking messages at this time

## 2022-08-15 RX ORDER — OMEPRAZOLE 20 MG/1
CAPSULE, DELAYED RELEASE ORAL
Qty: 30 CAPSULE | Refills: 0 | Status: SHIPPED | OUTPATIENT
Start: 2022-08-15 | End: 2022-09-01

## 2022-08-18 ENCOUNTER — HOSPITAL ENCOUNTER (OUTPATIENT)
Dept: PREADMISSION TESTING | Age: 62
Discharge: HOME | End: 2022-08-18

## 2022-08-18 VITALS — WEIGHT: 180 LBS | BODY MASS INDEX: 30.73 KG/M2 | HEIGHT: 64 IN

## 2022-08-18 RX ORDER — CHOLECALCIFEROL TAB 125 MCG (5000 UNIT) 125 MCG
5000 TAB ORAL DAILY
COMMUNITY

## 2022-08-18 RX ORDER — POTASSIUM CHLORIDE 20 MEQ/1
20 TABLET, EXTENDED RELEASE ORAL 2 TIMES DAILY
COMMUNITY

## 2022-08-18 RX ORDER — SODIUM CHLORIDE, SODIUM LACTATE, POTASSIUM CHLORIDE, CALCIUM CHLORIDE 600; 310; 30; 20 MG/100ML; MG/100ML; MG/100ML; MG/100ML
125 INJECTION, SOLUTION INTRAVENOUS CONTINUOUS
Status: CANCELLED | OUTPATIENT
Start: 2022-08-18

## 2022-08-18 NOTE — PERIOP NOTES
No sleep apnea or family history of malignant hyperthermia. PCP is aware of the surgery. No participation in clinical trial or research study. Do not bring any valuables on DOS- jewelry, wallet, cash, laptop, medications. Does meet criteria for special population-posting notified. Possible time delay day of surgery reviewed. Covid card- to bring dos DNR status-none. Patient denies allergy to oxycodone, only to tylenol-Notified pharmacy and \[facility]\"" to remove allergy to percocet.

## 2022-08-22 ENCOUNTER — ANESTHESIA EVENT (OUTPATIENT)
Dept: SURGERY | Age: 62
End: 2022-08-22
Payer: MEDICARE

## 2022-08-22 RX ORDER — DEXTROSE MONOHYDRATE 100 MG/ML
0-250 INJECTION, SOLUTION INTRAVENOUS AS NEEDED
Status: CANCELLED | OUTPATIENT
Start: 2022-08-22

## 2022-08-22 RX ORDER — HYDROMORPHONE HYDROCHLORIDE 1 MG/ML
0.2 INJECTION, SOLUTION INTRAMUSCULAR; INTRAVENOUS; SUBCUTANEOUS
Status: CANCELLED | OUTPATIENT
Start: 2022-08-22

## 2022-08-22 RX ORDER — FENTANYL CITRATE 50 UG/ML
25 INJECTION, SOLUTION INTRAMUSCULAR; INTRAVENOUS AS NEEDED
Status: CANCELLED | OUTPATIENT
Start: 2022-08-22

## 2022-08-22 RX ORDER — MAGNESIUM SULFATE 100 %
4 CRYSTALS MISCELLANEOUS AS NEEDED
Status: CANCELLED | OUTPATIENT
Start: 2022-08-22

## 2022-08-22 RX ORDER — ONDANSETRON 2 MG/ML
4 INJECTION INTRAMUSCULAR; INTRAVENOUS ONCE
Status: CANCELLED | OUTPATIENT
Start: 2022-08-22 | End: 2022-08-22

## 2022-08-22 RX ORDER — NALOXONE HYDROCHLORIDE 0.4 MG/ML
0.1 INJECTION, SOLUTION INTRAMUSCULAR; INTRAVENOUS; SUBCUTANEOUS AS NEEDED
Status: CANCELLED | OUTPATIENT
Start: 2022-08-22

## 2022-08-22 RX ORDER — SODIUM CHLORIDE 9 MG/ML
125 INJECTION, SOLUTION INTRAVENOUS CONTINUOUS
Status: CANCELLED | OUTPATIENT
Start: 2022-08-22

## 2022-08-22 RX ORDER — FENTANYL CITRATE 50 UG/ML
50 INJECTION, SOLUTION INTRAMUSCULAR; INTRAVENOUS
Status: CANCELLED | OUTPATIENT
Start: 2022-08-22

## 2022-08-22 NOTE — H&P
EGD - History and Physical     Subjective: The patient is a 58 y.o. obese female who is 5.5 months post sleeve to bypass conversion with dysphagia and recent UGI suggestive of a stricture. Patient Active Problem List     Diagnosis Date Noted    Severe obesity (BMI 35.0-39. 9) with comorbidity ([address] 2022    Sickle cell trait ([address]      Complication of internal knee prosthesis ([address] 2021    Low vitamin B12 level 2021    S/P laparoscopic sleeve gastrectomy 10/08/2019    Hypovitaminosis D 10/08/2019    UTI (lower urinary tract infection) 2015    Osteoarthritis 2015    Hypokalemia 2015    Urinary retention 2015    Fracture of distal femur ([address] 2015    Intestinal malabsorption 2014    H. pylori infection 2014    Chronic gastritis 2014    Hypertension      Hypercholesteremia      Hx of smoking      Migraine      Depression      Arthritic-like pain      Abnormal EKG 2011            Past Surgical History:   Procedure Laterality Date    DELIVERY        HX  SECTION        HX CHOLECYSTECTOMY        laparoscopic    HX CYST INCISION AND DRAINAGE        HX FEMUR FRACTURE TX        HX GI   2013     sleeve resection    HX KNEE ARTHROSCOPY Left 2010      x 2    HX KNEE REPLACEMENT   2011     bilateral    HX LAP GASTRIC BYPASS   2022     Dr Allan Woods, conversion from sleeve    HX ORTHOPAEDIC        left knee revision    HX ORTHOPAEDIC Right      right knee revision    HX ORTHOPAEDIC Right      broken femur      Social History            Tobacco Use    Smoking status: Former Smoker       Packs/day: 0.25       Years: 35.00       Pack years: 8.75       Types: Cigarettes       Quit date:        Years since quittin.4    Smokeless tobacco: Former User   Substance Use Topics    Alcohol use:  Yes       Alcohol/week: 0.8 standard drinks       Types: 1 Standard drinks or equivalent per week       Comment: 5 glasses wine per month            Family History   Problem Relation Age of Onset    Heart Disease Mother           Mom was told she had irreg heart beat/hole in heart , [place]    Breast Cancer Mother      Other Other           Non- contributory    Breast Cancer Maternal Grandmother      Malignant Hyperthermia Neg Hx      Pseudocholinesterase Deficiency Neg Hx      Delayed Awakening Neg Hx      Post-op Nausea/Vomiting Neg Hx      Emergence Delirium Neg Hx      Post-op Cognitive Dysfunction Neg Hx               Current Outpatient Medications   Medication Sig Dispense Refill    cyclobenzaprine (FLEXERIL) 5 mg tablet Take 5 mg by mouth as needed for Muscle Spasm(s). erenumab-aooe (Aimovig Autoinjector) 140 mg/mL injection 140 mg by SubCUTAneous route every thirty (30) days. Next dose July 5,2022        celecoxib (CELEBREX) 200 mg capsule Take 200 mg by mouth two (2) times a day. potassium chloride (KLOR-CON M10) 10 mEq tablet Take 20 mEq by mouth daily. linaCLOtide (Linzess) 145 mcg cap capsule Take 145 mcg by mouth nightly. OTHER Take 1 Tablet by mouth daily. Prevagen        Biotin 2,500 mcg cap Take 10,000 mcg by mouth daily. calcium citrate 200 mg (950 mg) tablet Take 600 mg by mouth three (3) times daily. One every 2 hours  3 times daily        oxyCODONE IR (ROXICODONE) 10 mg tab immediate release tablet Take 10 mg by mouth as needed for Pain. omeprazole (PRILOSEC) 20 mg capsule TAKE 1 CAPSULE BY MOUTH EVERY DAY (Patient taking differently: Take 20 mg by mouth daily.) 30 Capsule 0    B.animalis,bifid,infantis,long (Probiotic 4X) 10-15 mg TbEC Take 1 Tablet by mouth daily. pediatric multivitamin no.76 (FLINTSTONES COMPLETE PO) Take 2 Tablets by mouth daily. ondansetron (ZOFRAN ODT) 4 mg disintegrating tablet Take 1 Tablet by mouth every eight (8) hours as needed for Nausea or Vomiting.  30 Tablet 0    carvediloL (COREG) 6.25 mg tablet Take 6.25 mg by mouth daily as needed. Supposed to take daily, but only takes as needed when she has pain in arm        cyanocobalamin (VITAMIN B12) 1,000 mcg/mL injection 1 mL by SubCUTAneous route every thirty (30) days. (Patient taking differently: 1,000 mcg by SubCUTAneous route every thirty (30) days. Due  July 5) 30 mL 0    Syringe with Needle, Disp, 3 mL 25 x 5/8\" syrg 1 mL by SubCUTAneous route every thirty (30) days. 15 Syringe 0    traZODone (DESYREL) 100 mg tablet Take 300 mg by mouth nightly. Takes 2-3 tabs at bedtime        buPROPion XL (WELLBUTRIN XL) 150 mg tablet Take 300 mg by mouth daily. hydrOXYzine HCl (ATARAX) 25 mg tablet Take 25 mg by mouth three (3) times daily as needed for Anxiety. vilazodone (VIIBRYD) 40 mg tab tablet Take 40 mg by mouth daily. Allergies   Allergen Reactions    Percocet [Oxycodone-Acetaminophen] Itching            Review of Systems:         General - No history or complaints of unexpected fever, chills, or weight loss  Head/Neck - No history or complaints of headache, diplopia, dysphagia, hearing loss  Cardiac - No history or complaints of chest pain, palpitations, murmur, or shortness of breath  Pulmonary - No history or complaints of shortness of breath, productive cough, hemoptysis  Gastrointestinal - No history or complaints of reflux,  abdominal pain, obstipation/constipation, blood per rectum  Genitourinary - No history or complaints of hematuria/dysuria, stress urinary incontinence symptoms, or renal lithiasis  Musculoskeletal - No history or complaints of joint pain or muscular weakness  Hematologic - No history or complaints of bleeding disorders, blood transfusions, sickle cell anemia  Neurologic - No history or complaints of  migraine headaches, seizure activity, syncopal episodes, TIA or stroke  Integumentary - No history or complaints of rashes, abnormal nevi, skin cancer  Gynecological - unremarkable     Objective:       There were no vitals taken for this visit.        Physical Examination: General appearance - alert, well appearing, and in no distress and oriented to person, place, and time  Mental status - alert, oriented to person, place, and time, normal mood, behavior, speech, dress, motor activity, and thought processes  Eyes - pupils equal and reactive, extraocular eye movements intact, sclera anicteric, left eye normal, right eye normal  Ears - right ear normal, left ear normal  Nose - normal and patent, no erythema, discharge or polyps  Mouth - mucous membranes moist, pharynx normal without lesions  Neck - supple, no significant adenopathy  Lymphatics - no palpable lymphadenopathy, no hepatosplenomegaly  Chest - clear to auscultation, no wheezes, rales or rhonchi, symmetric air entry  Heart - normal rate, regular rhythm, normal S1, S2, no murmurs, rubs, clicks or gallops  Abdomen - soft, nontender, nondistended, no masses or organomegaly  Back exam - full range of motion, no tenderness, palpable spasm or pain on motion  Neurological - alert, oriented, normal speech, no focal findings or movement disorder noted  Musculoskeletal - no joint tenderness, deformity or swelling  Extremities - peripheral pulses normal, no pedal edema, no clubbing or cyanosis  Skin - normal coloration and turgor, no rashes, no suspicious skin lesions noted     Labs / Preoperative Evaluations:      Recent Results   No results found for this or any previous visit (from the past 1008 hour(s)). Assessment:      5.5 months post conversion with abnormal UGI concerning for stricture     Plan:      EGD     Plan for EGD. She understands the risks, benefits and alternatives of the EGD. She understands the risk include but are not limited to; death, DVT/PE, damage to surrounding structures, perforation of the GI tract, equipment malfunction, bleeding, and infection. She understands all of the above and wishes to proceed with EGD.

## 2022-08-23 ENCOUNTER — ANESTHESIA (OUTPATIENT)
Dept: SURGERY | Age: 62
End: 2022-08-23
Payer: MEDICARE

## 2022-08-23 ENCOUNTER — HOSPITAL ENCOUNTER (OUTPATIENT)
Age: 62
Setting detail: OUTPATIENT SURGERY
Discharge: HOME | End: 2022-08-23
Attending: SPECIALIST | Admitting: SPECIALIST
Payer: MEDICARE

## 2022-08-23 ENCOUNTER — APPOINTMENT (OUTPATIENT)
Dept: SURGERY | Age: 62
End: 2022-08-23

## 2022-08-23 VITALS
OXYGEN SATURATION: 100 % | HEART RATE: 69 BPM | RESPIRATION RATE: 16 BRPM | HEIGHT: 64 IN | TEMPERATURE: 97.4 F | BODY MASS INDEX: 32.25 KG/M2 | DIASTOLIC BLOOD PRESSURE: 81 MMHG | WEIGHT: 188.9 LBS | SYSTOLIC BLOOD PRESSURE: 121 MMHG

## 2022-08-23 PROCEDURE — 43245 EGD DILATE STRICTURE: CPT | Performed by: SPECIALIST

## 2022-08-23 PROCEDURE — 76010000154 HC OR TIME FIRST 0.5 HR: Performed by: SPECIALIST

## 2022-08-23 PROCEDURE — 74011250636 HC RX REV CODE- 250/636: Performed by: SPECIALIST

## 2022-08-23 PROCEDURE — 76060000031 HC ANESTHESIA FIRST 0.5 HR: Performed by: SPECIALIST

## 2022-08-23 PROCEDURE — 77030040361 HC SLV COMPR DVT MDII -B: Performed by: SPECIALIST

## 2022-08-23 PROCEDURE — 2709999900 HC NON-CHARGEABLE SUPPLY: Performed by: SPECIALIST

## 2022-08-23 PROCEDURE — 74011250636 HC RX REV CODE- 250/636: Performed by: NURSE ANESTHETIST, CERTIFIED REGISTERED

## 2022-08-23 PROCEDURE — 76210000021 HC REC RM PH II 0.5 TO 1 HR: Performed by: SPECIALIST

## 2022-08-23 PROCEDURE — 77030020782 HC GWN BAIR PAWS FLX 3M -B: Performed by: SPECIALIST

## 2022-08-23 PROCEDURE — 77030031670 HC DEV INFL ENDOTEK BIG60 MRTM -B: Performed by: SPECIALIST

## 2022-08-23 PROCEDURE — 74011000250 HC RX REV CODE- 250: Performed by: NURSE ANESTHETIST, CERTIFIED REGISTERED

## 2022-08-23 PROCEDURE — C1726 CATH, BAL DIL, NON-VASCULAR: HCPCS | Performed by: SPECIALIST

## 2022-08-23 RX ORDER — SODIUM CHLORIDE, SODIUM LACTATE, POTASSIUM CHLORIDE, CALCIUM CHLORIDE 600; 310; 30; 20 MG/100ML; MG/100ML; MG/100ML; MG/100ML
125 INJECTION, SOLUTION INTRAVENOUS CONTINUOUS
Status: DISCONTINUED | OUTPATIENT
Start: 2022-08-23 | End: 2022-08-23 | Stop reason: HOSPADM

## 2022-08-23 RX ORDER — PROPOFOL 10 MG/ML
INJECTION, EMULSION INTRAVENOUS AS NEEDED
Status: DISCONTINUED | OUTPATIENT
Start: 2022-08-23 | End: 2022-08-23 | Stop reason: HOSPADM

## 2022-08-23 RX ORDER — MIDAZOLAM HYDROCHLORIDE 1 MG/ML
INJECTION, SOLUTION INTRAMUSCULAR; INTRAVENOUS AS NEEDED
Status: DISCONTINUED | OUTPATIENT
Start: 2022-08-23 | End: 2022-08-23 | Stop reason: HOSPADM

## 2022-08-23 RX ORDER — LIDOCAINE HYDROCHLORIDE 20 MG/ML
INJECTION, SOLUTION EPIDURAL; INFILTRATION; INTRACAUDAL; PERINEURAL AS NEEDED
Status: DISCONTINUED | OUTPATIENT
Start: 2022-08-23 | End: 2022-08-23 | Stop reason: HOSPADM

## 2022-08-23 RX ORDER — GLYCOPYRROLATE 0.2 MG/ML
INJECTION INTRAMUSCULAR; INTRAVENOUS AS NEEDED
Status: DISCONTINUED | OUTPATIENT
Start: 2022-08-23 | End: 2022-08-23 | Stop reason: HOSPADM

## 2022-08-23 RX ADMIN — LIDOCAINE HYDROCHLORIDE 100 MG: 20 INJECTION, SOLUTION INTRAVENOUS at 13:16

## 2022-08-23 RX ADMIN — PROPOFOL 100 MG: 10 INJECTION, EMULSION INTRAVENOUS at 13:16

## 2022-08-23 RX ADMIN — GLYCOPYRROLATE 0.1 MG: 0.2 INJECTION INTRAMUSCULAR; INTRAVENOUS at 13:12

## 2022-08-23 RX ADMIN — PROPOFOL 40 MG: 10 INJECTION, EMULSION INTRAVENOUS at 13:18

## 2022-08-23 RX ADMIN — SODIUM CHLORIDE, SODIUM LACTATE, POTASSIUM CHLORIDE, AND CALCIUM CHLORIDE 125 ML/HR: 600; 310; 30; 20 INJECTION, SOLUTION INTRAVENOUS at 10:55

## 2022-08-23 RX ADMIN — SODIUM CHLORIDE, SODIUM LACTATE, POTASSIUM CHLORIDE, AND CALCIUM CHLORIDE: 600; 310; 30; 20 INJECTION, SOLUTION INTRAVENOUS at 13:13

## 2022-08-23 RX ADMIN — MIDAZOLAM 2 MG: 1 INJECTION INTRAMUSCULAR; INTRAVENOUS at 13:12

## 2022-08-23 RX ADMIN — PROPOFOL 40 MG: 10 INJECTION, EMULSION INTRAVENOUS at 13:20

## 2022-08-23 NOTE — PERIOP NOTES
Reviewed PTA medication list with patient/caregiver and patient/caregiver denies any additional medications. Patient admits to having a responsible adult care for them at home for at least 24 hours after surgery. Patient encouraged to use gown warming system and informed that using said warming gown to regulate body temperature prior to a procedure has been shown to help reduce the risks of blood clots and infection. Patient's pharmacy of choice verified and documented in PTA medication section. Dual skin assessment & fall risk band verification completed with Cb Garcia RN.

## 2022-08-23 NOTE — ANESTHESIA POSTPROCEDURE EVALUATION
Procedure(s):  ESOPHAGAGASTRODUOENOSCOPY (EGD)  DILATION (SPEC POP). MAC    Anesthesia Post Evaluation        Comments: Post-Anesthesia Evaluation and Assessment    Cardiovascular Function/Vital Signs  /85   Pulse 69   Temp 36.8 °C (98.3 °F)   Resp 23   Ht 5' 4\" (1.626 m)   Wt 85.7 kg (188 lb 14.4 oz)   SpO2 100%   BMI 32.42 kg/m²     Patient is status post Procedure(s):  ESOPHAGAGASTRODUOENOSCOPY (EGD)  DILATION (SPEC POP). Nausea/Vomiting: Controlled. Postoperative hydration reviewed and adequate. Pain:  Pain Scale 1: Numeric (0 - 10) (08/23/22 1018)  Pain Intensity 1: 0 (08/23/22 1018)   Managed. Neurological Status:   Neuro (WDL): Within Defined Limits (08/23/22 1125)   At baseline. Mental Status and Level of Consciousness: Arousable. Pulmonary Status:   O2 Device: Oxygen mask (08/23/22 1324)   Adequate oxygenation and airway patent. Complications related to anesthesia: None    Post-anesthesia assessment completed. No concerns. Patient has met all discharge requirements.     Signed By: Aparna Gonzales MD    August 23, 2022                   INITIAL Post-op Vital signs:   Vitals Value Taken Time   /85 08/23/22 1416   Temp 36.8 °C (98.3 °F) 08/23/22 1338   Pulse 69 08/23/22 1425   Resp     SpO2 100 % 08/23/22 1425

## 2022-08-23 NOTE — PROCEDURES
Esophagogastroduodenoscopy Procedure Note    Indications: 5.5 months post sleeve to bypass conversion with dysphagia and recent UGI suggestive of a stricture    Anesthesia/Sedation: MAC anesthesia    Pre-Procedure Exam:  Airway: clear   Heart: normal S1and S2    Lungs: clear bilateral  Abdomen: soft, nontender, bowel sounds present and normal in all quadrants   Mental Status: awake, alert, and oriented to person, place, and time      Procedure in Detail:  Informed consent was obtained for the procedure, including conscious sedation. Risks of pancreatitis, infection, perforation, hemorrhage, adverse drug reaction, and aspiration were discussed. The patient was placed in the left lateral decubitus position. Based on the pre-procedure assessment, including review of the patient's medical history, medications, allergies, and review of systems, he had been deemed to be an appropriate candidate for moderate sedation; he was therefore sedated with the medications listed above. He was monitored continuously with electrocardiogram tracing, pulse oximetry, blood pressure monitoring, and direct observation. The [id number] gastroscope was inserted into the mouth and advanced under direct vision to proximal selma limb. A careful inspection was made as the gastroscope was withdrawn, including a retroflexed view of the proximal stomach; findings and interventions are described below. Appropriate photodocumentation was obtained.     Findings:   Oropharynx - normal mucosa without ulcer or obstruction of deformity   Esophagus - normal mucosa without rings, webs or other obstructions or abnormalities  Pouch - normal mucosa and adequate size without ulcer, erythema or other abnormalities  Anastomosis - minimal stricture with dilation as below with no ulcer disease or obstruction  Selma Limb -  normal mucosa without ulcer or obstruction of deformity      Therapies:    - balloon dilation to 20 mm    Specimens: No specimens were collected. Complications:   None; patient tolerated the procedure well. EBL:  None           Attending Attestation:  I performed the procedure. Recommendations:  - Follow symptoms. Signed by:  Tashia Mack MD

## 2022-08-23 NOTE — INTERVAL H&P NOTE
Update History & Physical    The Patient's History and Physical of August 22, 2022 was reviewed with the patient and I examined the patient. There was no change. The surgical site was confirmed by the patient and me. Plan:  The risk, benefits, expected outcome, and alternative to the recommended procedure have been discussed with the patient. Patient understands and wants to proceed with the procedure.     Electronically signed by Tirso Nunez MD on 8/23/2022 at 1:06 PM

## 2022-08-23 NOTE — ANESTHESIA PREPROCEDURE EVALUATION
Relevant Problems   NEUROLOGY   (+) Depression   (+) Migraine      CARDIOVASCULAR   (+) Hypertension      ENDOCRINE   (+) Severe obesity (BMI 35.0-39. 9) with comorbidity ([address]       Anesthetic History   No history of anesthetic complications            Review of Systems / Medical History  Patient summary reviewed, nursing notes reviewed and pertinent labs reviewed    Pulmonary  Within defined limits                 Neuro/Psych         Psychiatric history     Cardiovascular    Hypertension            Pertinent negatives: No CAD  Exercise tolerance: >4 METS     GI/Hepatic/Renal  Within defined limits              Endo/Other        Morbid obesity and arthritis     Other Findings              Physical Exam    Airway  Mallampati: II  TM Distance: 4 - 6 cm  Neck ROM: normal range of motion   Mouth opening: Normal     Cardiovascular  Regular rate and rhythm,  S1 and S2 normal,  no murmur, click, rub, or gallop             Dental    Dentition: Edentulous     Pulmonary  Breath sounds clear to auscultation               Abdominal  GI exam deferred       Other Findings            Anesthetic Plan    ASA: 2  Anesthesia type: MAC          Induction: Intravenous  Anesthetic plan and risks discussed with: Patient    Advised patient to removed nose rings, but payient refused. She understands the risk.

## 2022-08-23 NOTE — PERIOP NOTES
TRANSFER - IN REPORT:    Verbal report received from [facility] RN(name) on Thania Ny  being received from OR(unit) for routine progression of care      Report consisted of patients Situation, Background, Assessment and   Recommendations(SBAR). Information from the following report(s) SBAR was reviewed with the receiving nurse. Opportunity for questions and clarification was provided. Assessment completed upon patients arrival to unit and care assumed.

## 2022-08-23 NOTE — DISCHARGE INSTRUCTIONS
Barbie Holliday  [id number]  1960    EGD DISCHARGE INSTRUCTIONS    Discomfort:  Sore throat- throat lozenges or warm salt water gargle  redness at IV site- apply warm compress to area; if redness or soreness persist- contact your physician  Gaseous discomfort- walking, belching will help relieve any discomfort  You should not operate a vehicle for 12 hours  You should note engage in an occupation involving machinery or appliances for rest of today  You may note drink alcoholic beverages for at least 12 hours  Avoid making any critical decisions for at least 24 hour  DIET  You may resume your regular diet - however -  remember your colon is empty and a heavy meal will produce gas. Avoid these foods:  vegetables, fried / greasy foods, carbonated drinks    ACTIVITY  You may resume your normal daily activities   Spend the remainder of the day resting -  avoid any strenuous activity. CALL M.D. ANY SIGN OF   Increasing pain, nausea, vomiting  Abdominal distension (swelling)  New increased bleeding (oral or rectal)  Fever (chills)  Pain in chest area  Bloody discharge from nose or mouth  Shortness of breath       Follow-up Instructions:   Dr Shelly Macias will call you in one to two weeks. If you do not hear from him, please call his office [phone]. Barbie Holliday  [id number]  1960        DISCHARGE SUMMARY from Nurse    The following personal items collected during your admission are returned to you:   Dental Appliance: Dental Appliances: Lowers, Uppers, At home  Vision: Visual Aid: Glasses, Other (comment) (glasses in locker)  Hearing Aid:    Estanislado Boatman: Estanislado Boatman: Body Piercing, Earrings (earring and nose ring)  Clothing: Clothing: Shirt, Pants, Footwear, Undergarments, Socks  Other Valuables:  Other Valuables: None  Valuables sent to safe: Personal Items Sent to Safe: none    PATIENT INSTRUCTIONS:    Take Home Medications:  Current Discharge Medication List        CONTINUE these medications which have NOT CHANGED    Details   potassium chloride (K-DUR, KLOR-CON M20) 20 mEq tablet Take 20 mEq by mouth two (2) times a day. cholecalciferol (VITAMIN D3) (5000 Units/125 mcg) tab tablet Take 5,000 Units by mouth daily. omeprazole (PRILOSEC) 20 mg capsule TAKE 1 CAPSULE BY MOUTH EVERY DAY  Qty: 30 Capsule, Refills: 0      celecoxib (CELEBREX) 200 mg capsule Take 200 mg by mouth two (2) times a day. linaCLOtide (LINZESS) 145 mcg cap capsule Take 145 mcg by mouth nightly. Indications: chronic idiopathic constipation      OTHER Take 1 Tablet by mouth daily. Prevagen      Biotin 2,500 mcg cap Take 10,000 mcg by mouth daily. pediatric multivitamin no.76 (FLINTSTONES COMPLETE PO) Take 2 Tablets by mouth daily. carvediloL (COREG) 6.25 mg tablet Take 6.25 mg by mouth daily. Supposed to take daily, but only takes as needed when she has pain in arm      cyanocobalamin (VITAMIN B12) 1,000 mcg/mL injection 1 mL by SubCUTAneous route every thirty (30) days. Qty: 30 mL, Refills: 0    Associated Diagnoses: Intestinal malabsorption, unspecified type; S/P laparoscopic sleeve gastrectomy; Hypovitaminosis D; Hypokalemia; Essential hypertension; Low vitamin B12 level      traZODone (DESYREL) 100 mg tablet Take 300 mg by mouth nightly as needed for Sleep. Takes 2-3 tabs at bedtime      buPROPion XL (WELLBUTRIN XL) 150 mg tablet Take 300 mg by mouth daily. Indications: anxiousness associated with depression      hydrOXYzine HCl (ATARAX) 25 mg tablet Take 25 mg by mouth three (3) times daily as needed for Anxiety. vilazodone (VIIBRYD) 40 mg tab tablet Take 40 mg by mouth daily. Indications: major depressive disorder      cyclobenzaprine (FLEXERIL) 5 mg tablet Take 5 mg by mouth as needed for Muscle Spasm(s). erenumab-aooe (Aimovig Autoinjector) 140 mg/mL injection 140 mg by SubCUTAneous route every thirty (30) days.  Indications: migraine prevention      oxyCODONE IR (ROXICODONE) 10 mg tab immediate release tablet Take 10 mg by mouth as needed for Pain. Syringe with Needle, Disp, 3 mL 25 x 5/8\" syrg 1 mL by SubCUTAneous route every thirty (30) days. Qty: 15 Syringe, Refills: 0    Associated Diagnoses: Intestinal malabsorption, unspecified type; S/P laparoscopic sleeve gastrectomy; Hypovitaminosis D; Hypokalemia; Essential hypertension; Low vitamin B12 level               DISCHARGE SUMMARY from Nurse    PATIENT INSTRUCTIONS:    After general anesthesia or intravenous sedation, for 24 hours or while taking prescription Narcotics:  Limit your activities  Do not drive and operate hazardous machinery  Do not make important personal or business decisions  Do  not drink alcoholic beverages  If you have not urinated within 8 hours after discharge, please contact your surgeon on call. Report the following to your surgeon:  Excessive pain, swelling, redness or odor of or around the surgical area  Temperature over 100.5  Nausea and vomiting lasting longer than 4 hours or if unable to take medications  Any signs of decreased circulation or nerve impairment to extremity: change in color, persistent  numbness, tingling, coldness or increase pain  Any questions    What to do at Home:  Recommended activity: Ambulate in house,     If you experience any of the following symptoms above, please follow up with Dr. Shirley Lynch    *  Please give a list of your current medications to your Primary Care Provider. *  Please update this list whenever your medications are discontinued, doses are      changed, or new medications (including over-the-counter products) are added. *  Please carry medication information at all times in case of emergency situations. These are general instructions for a healthy lifestyle:    No smoking/ No tobacco products/ Avoid exposure to second hand smoke  Surgeon General's Warning:  Quitting smoking now greatly reduces serious risk to your health.     Obesity, smoking, and sedentary lifestyle greatly increases your risk for illness    A healthy diet, regular physical exercise & weight monitoring are important for maintaining a healthy lifestyle    You may be retaining fluid if you have a history of heart failure or if you experience any of the following symptoms:  Weight gain of 3 pounds or more overnight or 5 pounds in a week, increased swelling in our hands or feet or shortness of breath while lying flat in bed. Please call your doctor as soon as you notice any of these symptoms; do not wait until your next office visit. Patient armband removed and shredded     The discharge information has been reviewed with the patient and caregiver. The patient and caregiver verbalized understanding. Discharge medications reviewed with the patient and caregiver and appropriate educational materials and side effects teaching were provided.   ___________________________________________________________________________________________________________________________________

## 2022-09-01 DIAGNOSIS — K90.9 INTESTINAL MALABSORPTION, UNSPECIFIED TYPE: ICD-10-CM

## 2022-09-01 DIAGNOSIS — Z98.84 S/P LAPAROSCOPIC SLEEVE GASTRECTOMY: ICD-10-CM

## 2022-09-01 DIAGNOSIS — E53.8 LOW VITAMIN B12 LEVEL: ICD-10-CM

## 2022-09-01 DIAGNOSIS — E87.6 HYPOKALEMIA: ICD-10-CM

## 2022-09-01 DIAGNOSIS — E55.9 HYPOVITAMINOSIS D: ICD-10-CM

## 2022-09-01 DIAGNOSIS — I10 ESSENTIAL HYPERTENSION: ICD-10-CM

## 2022-09-01 RX ORDER — OMEPRAZOLE 20 MG/1
CAPSULE, DELAYED RELEASE ORAL
Qty: 30 CAPSULE | Refills: 0 | Status: SHIPPED | OUTPATIENT
Start: 2022-09-01 | End: 2022-10-07

## 2022-09-01 RX ORDER — CYANOCOBALAMIN 1000 UG/ML
1000 INJECTION, SOLUTION INTRAMUSCULAR; SUBCUTANEOUS
Qty: 1 ML | Refills: 3 | Status: SHIPPED | OUTPATIENT
Start: 2022-09-01

## 2022-09-28 ENCOUNTER — OFFICE VISIT (OUTPATIENT)
Dept: SURGERY | Age: 62
End: 2022-09-28
Payer: MEDICARE

## 2022-09-28 ENCOUNTER — HOSPITAL ENCOUNTER (OUTPATIENT)
Dept: LAB | Age: 62
Discharge: HOME | End: 2022-09-28

## 2022-09-28 VITALS
BODY MASS INDEX: 31.14 KG/M2 | DIASTOLIC BLOOD PRESSURE: 86 MMHG | HEART RATE: 88 BPM | HEIGHT: 64 IN | TEMPERATURE: 97.3 F | WEIGHT: 182.4 LBS | SYSTOLIC BLOOD PRESSURE: 138 MMHG | OXYGEN SATURATION: 100 %

## 2022-09-28 DIAGNOSIS — Z98.84 S/P LAPAROSCOPIC SLEEVE GASTRECTOMY: ICD-10-CM

## 2022-09-28 DIAGNOSIS — E53.8 LOW VITAMIN B12 LEVEL: ICD-10-CM

## 2022-09-28 DIAGNOSIS — K90.9 INTESTINAL MALABSORPTION, UNSPECIFIED TYPE: Primary | ICD-10-CM

## 2022-09-28 DIAGNOSIS — E55.9 HYPOVITAMINOSIS D: ICD-10-CM

## 2022-09-28 LAB — XX-LABCORP SPECIMEN COL,LCBCF: NORMAL

## 2022-09-28 PROCEDURE — G9717 DOC PT DX DEP/BP F/U NT REQ: HCPCS | Performed by: NURSE PRACTITIONER

## 2022-09-28 PROCEDURE — G8752 SYS BP LESS 140: HCPCS | Performed by: NURSE PRACTITIONER

## 2022-09-28 PROCEDURE — G8754 DIAS BP LESS 90: HCPCS | Performed by: NURSE PRACTITIONER

## 2022-09-28 PROCEDURE — 99214 OFFICE O/P EST MOD 30 MIN: CPT | Performed by: NURSE PRACTITIONER

## 2022-09-28 PROCEDURE — 99001 SPECIMEN HANDLING PT-LAB: CPT

## 2022-09-28 PROCEDURE — G8417 CALC BMI ABV UP PARAM F/U: HCPCS | Performed by: NURSE PRACTITIONER

## 2022-09-28 PROCEDURE — 3017F COLORECTAL CA SCREEN DOC REV: CPT | Performed by: NURSE PRACTITIONER

## 2022-09-28 PROCEDURE — G8428 CUR MEDS NOT DOCUMENT: HCPCS | Performed by: NURSE PRACTITIONER

## 2022-09-28 RX ORDER — DICYCLOMINE HYDROCHLORIDE 10 MG/1
10 CAPSULE ORAL
COMMUNITY

## 2022-09-28 RX ORDER — NYSTATIN 100000 U/G
CREAM TOPICAL 2 TIMES DAILY
Qty: 30 G | Refills: 2 | Status: SHIPPED | OUTPATIENT
Start: 2022-09-28

## 2022-09-28 RX ORDER — PSEUDOEPHEDRINE HCL 30 MG
TABLET ORAL
COMMUNITY

## 2022-09-28 NOTE — PATIENT INSTRUCTIONS
Patient Instructions      Remember hydration goals - minimum of 64 ounces of liquids per day (dehydration is the number one reason for hospital readmission). Continue to monitor carbohydrate and protein intake you need a minimum of  Grams of protein daily- remember to keep your total carbohydrates to 50 grams or less per day for best results. Continue to work towards exercise goals - 60-90 minutes, 5 times a week minimum of deliberate, aerobic exercise is the ultimate goal with strength training 2 times each week. Refer to [employer] for  information. Remember to take vitamins as directed. Attend support group the 2nd Thursday of each month. 6.  Constipation: Milk of Magnesia is for immediate relief only. Miralax is to be used every day if constipation is a chronic problem. 7.  Diarrhea: patients will occasionally develop lactose intolerance after surgery. Check to see if your protein shake has whey in it. If it does try a protein powder or drink that does not have whey and stop all yogurts, cheeses and milks to see if the diarrhea goes away. 8.  If you have had labs drawn. We will only call you if you have abnormal results. Otherwise you can access the lab results in \"[facility]\". You will only need the access code the first time you sign on. 9.  Call us at [phone] or email us through [facility] with questions,     concerns or worsening of condition, we have someone on call 24 hours a day. If you are unable to reach our office, you are to go to your Primary Care Physician or the Emergency Department.      NOTE TO GASTRIC BYPASS PATIENTS:  (SAME APPLIES TO GASTRIC SLEEVE PATIENTS FOR FIRST TWO MONTHS)  Remember that for the rest of your life, you are not able to take the following:  - NSAIDs (ibuprofen, goody powder, BC powder, Motrin, Advil, Mobic, Voltaren, Excedrin, etc.)  - Steroid pills or injections  - Smoke (cigarettes or recreational drugs)  - Alcohol  Use of any of the above may cause ulcers in your stomach which may perforate causing a medical emergency and surgery. Speak to our medical staff if another medical provider requires you to take steroids or NSAIDs. Supplement Resource Guide    Importance of Protein:   Maintains lean body mass, produces antibodies to fight off infections, heals wounds, minimizes hair loss, helps to give you energy, helps with satiety, and keeping you full between meals. Importance of Calcium:  Needed for healthy bones and teeth, normal blood clotting, and nervous system functioning, higher risk of osteoporosis and bone disease with non-compliance. Importance of Multivitamins: Many functions. Supply you with extra nutrients that you may be missing from food. May lead to iron deficiency anemia, weakness, fatigue, and many other symptoms with non-compliance. Importance of B Vitamins:  Important for red blood cell formation, metabolism, energy, and helps to maintain a healthy nervous system. Protein Supplement  Find one you like now. Use immediately after surgery. Look for:  35-50g protein each day from your protein supplement once you reach the progression diet. 0-3 g fat per serving  0-3 g sugar per serving    Protein drinks should be split in separate dosages. Recommend: Lifelong  1 year + Calcium Supplement:     Start taking within a month after surgery. Look for: Calcium Citrate Plus D (1500 mg per day)  Recommend: Citracal     .            Avoid chocolate chewable calcium. Can use chewable bariatric or [employer] brand or similar chewable. The body cannot absorb more than 500-600 mg of calcium at a time. Take for Life Multi-vitamin Supplement:      Start immediately after surgery: any complete chewable, such as: [employer]s Complete chewables. Avoid [employer] sours or gummies.   They lack iron and other important nutrients and also have added sugar. Continue with chewable vitamin or change to adult complete multivitamin one month after surgery. Menstruating women can take a prenatal vitamin. Make sure has at least 18 mg iron and 347-125 mcg folic acid   Vitamin Y39, B Complex Vitamin, and Biotin  Start taking within a month after surgery. Vitamin B12:  1000 mcg of Vitamin B12 three times weekly    Must take sublingually (meaning you take it under your tongue) or in a liquid drop form for easy absorption. B Complex Vitamin: Take a pill or liquid drop form once daily. Biotin: This vitamin can help prevent hair loss. Recommend 5mg   (5000 mcg) a day  Biotin is Optional           Learning About Being Physically Active  What is physical activity? Being physically active means doing any kind of activity that gets your body moving. The types of physical activity that can help you get fit and stay healthy include:  Aerobic or \"cardio\" activities. These make your heart beat faster and make you breathe harder, such as brisk walking, riding a bike, or running. They strengthen your heart and lungs and build up your endurance. Strength training activities. These make your muscles work against, or \"resist,\" something. Examples include lifting weights or doing push-ups. These activities help tone and strengthen your muscles and bones. Stretches. These let you move your joints and muscles through their full range of motion. Stretching helps you be more flexible. What are the benefits of being active? Being active is one of the best things you can do for your health. It helps you to:  Feel stronger and have more energy to do all the things you like to do. Focus better at school or work. Feel, think, and sleep better. Reach and stay at a healthy weight. Lose fat and build lean muscle. Lower your risk for serious health problems, including diabetes, heart attack, high blood pressure, and some cancers.   Keep your heart, lungs, bones, muscles, and joints strong and healthy. How can you make being active part of your life? Start slowly. Make it your long-term goal to get at least 30 minutes of exercise on most days of the week. Walking is a good choice. You also may want to do other activities, such as running, swimming, cycling, or playing tennis or team sports. Pick activities that you like--ones that make your heart beat faster, your muscles stronger, and your muscles and joints more flexible. If you find more than one thing you like doing, do them all. You don't have to do the same thing every day. Get your heart pumping every day. Any activity that makes your heart beat faster and keeps it at that rate for a while counts. Here are some great ways to get your heart beating faster:  Go for a brisk walk, run, or bike ride. Go for a hike or swim. Go in-line skating. Play a game of touch football, basketball, or soccer. Ride a bike. Play tennis or racquetball. Climb stairs. Even some household chores can be aerobic--just do them at a faster pace. Vacuuming, raking or mowing the lawn, sweeping the garage, and washing and waxing the car all can help get your heart rate up. Strengthen your muscles during the week. You don't have to lift heavy weights or grow big, bulky muscles to get stronger. Doing a few simple activities that make your muscles work against, or \"resist,\" something can help you get stronger. For example, you can:  Do push-ups or sit-ups, which use your own body weight as resistance. Lift weights or dumbbells or use stretch bands at home or in a gym or community center. Stretch your muscles often. Stretching will help you as you become more active. It can help you stay flexible, loosen tight muscles, and avoid injury. It can also help improve your balance and posture and can be a great way to relax. Be sure to stretch the muscles you'll be using when you work out.  It's best to warm your muscles slightly before you stretch them. Walk or do some other light aerobic activity for a few minutes, and then start stretching. When you stretch your muscles:  Do it slowly. Stretching is not about going fast or making sudden movements. Don't push or bounce during a stretch. Hold each stretch for at least 15 to 30 seconds, if you can. You should feel a stretch in the muscle, but not pain. Breathe out as you do the stretch. Then breathe in as you hold the stretch. Don't hold your breath. If you're worried about how more activity might affect your health, have a checkup before you start. Follow any special advice your doctor gives you for getting a smart start. Where can you learn more? Go to http://www.gray.com/  Enter [id number] in the search box to learn more about \"Learning About Being Physically Active. \"  Current as of: May 12, 2021               Content Version: 13.2  © 1073-7413 [employer]. Care instructions adapted under license by [employer] (which disclaims liability or warranty for this information). If you have questions about a medical condition or this instruction, always ask your healthcare professional. [place] [address] any warranty or liability for your use of this information.

## 2022-09-28 NOTE — PROGRESS NOTES
Subjective:     Olinda Navarro  is a 58 y.o. female who presents for follow-up about 6.75 months following  conversion sleeve gastrectomy to gastric bypass with lysis of adhesions greater than 45 minutes and small bowel resection . Surgery related complication: NA, but minimal stricture at 5.5 months postop. She has lost a total of 41 pounds since surgery. Body mass index is 31.31 kg/m². Cleophus Lily Loss of EBW 45%. The patient presents today to assess their progress toward their weight loss goal & to address any issues that may be present:   She is tolerating solids without difficulty, reports less vomiting to now only about 3 times a week since dilation, that had been occurring several times a day. Now happens about 30 minutes after waiting between drinking and eating. She denies abdominal pain, difficulty swallowing, nausea and reflux. Had EGD at 5.5 months postop with balloon dilation to 20mm    The patients diet choices have been reviewed today and counseling was given. Fluid intake: good      Protein intake:no supplements; deli meat, fried oysters, fish, tuna, yogurt      Meals/day: 1-2  Is eating bread, crackers, watermelon    Taking vitamins as recommended. The patient's exercise level: increased, walking 1/4 mile most days now    Changes in her medical history and medications have been reviewed. Comorbidities:    Hypertension: improved , off medications  Diabetes: not applicable  Obstructive Sleep Apnea: not applicable  Hyperlipidemia: not applicable  Stress Urinary Incontinence: not applicable  Gastroesophageal Reflux: not applicable  Weight related arthropathy:unchanged         Patient Active Problem List   Diagnosis Code    Abnormal EKG R94.31    Hypertension I10    Hypercholesteremia E78.00    Hx of smoking Z87.891    Migraine G43.909    Depression F32. A    Arthritic-like pain M25.50    Intestinal malabsorption K90.9    H. pylori infection A04.8    Chronic gastritis K29.50 Fracture of distal femur ([facility] S72.409A    Osteoarthritis M19.90    Hypokalemia E87.6    Urinary retention R33.9    UTI (lower urinary tract infection) N39.0    S/P laparoscopic sleeve gastrectomy Z98.84    Hypovitaminosis D E55.9    Low vitamin B12 level G65.8    Complication of internal knee prosthesis ([facility] T84. 9XXA, Z96.659    Sickle cell trait ([facility] D57.3    Severe obesity (BMI 35.0-39. 9) with comorbidity ([facility] E66.01     Past Medical History:   Diagnosis Date    Anemia 1965    Anxiety 2010    on meds    Arthritis 2012    Osteo-legs and wrists    Bipolar disorder ([facility] 2012    CAD (coronary artery disease) 2005    Chronic pain 2019    legs, low back    Depression     GERD (gastroesophageal reflux disease)     Hypercholesteremia 2012    not on meds currently    Hypertension 2005    Migraine 1972    Sickle cell trait ([facility] 1960    born with it     Past Surgical History:   Procedure Laterality Date    [address]    HX CHOLECYSTECTOMY  2005    laparoscopic    HX GI  09/2013    sleeve resection    HX KNEE ARTHROSCOPY Left 2010     x 2    HX KNEE REPLACEMENT  2011    total    HX LAP GASTRIC BYPASS  03/01/2022    Dr Rachael Brian, conversion from sleeve    HX ORTHOPAEDIC Left 2016    left knee revision x2    HX ORTHOPAEDIC Right 2021    right knee revision x4    HX ORTHOPAEDIC Right 2014    broken femur    HX TUBAL LIGATION  1995    [facility] BREAST SUR[facility] PROCEDURE UNLISTED Left 2012    I&D of cyst     Current Outpatient Medications   Medication Sig Dispense Refill    dicyclomine (BENTYL) 10 mg capsule Take 10 mg by mouth 4 times daily (before meals and nightly). omeprazole (PRILOSEC) 20 mg capsule TAKE 1 CAPSULE BY MOUTH EVERY DAY 30 Capsule 0    cyanocobalamin (VITAMIN B12) 1,000 mcg/mL injection 1 mL by SubCUTAneous route every thirty (30) days. Indications: inadequate vitamin B12 1 mL 3    Insulin Syringe-Needle U-100 1 mL 25 gauge x 5/8\" syrg 1 Syringe by Does Not Apply route every month.  12 Each 0 potassium chloride (K-DUR, KLOR-CON M20) 20 mEq tablet Take 20 mEq by mouth two (2) times a day. cholecalciferol (VITAMIN D3) (5000 Units/125 mcg) tab tablet Take 5,000 Units by mouth daily. cyclobenzaprine (FLEXERIL) 5 mg tablet Take 5 mg by mouth as needed for Muscle Spasm(s). erenumab-aooe (Aimovig Autoinjector) 140 mg/mL injection 140 mg by SubCUTAneous route every thirty (30) days. Indications: migraine prevention      celecoxib (CELEBREX) 200 mg capsule Take 200 mg by mouth two (2) times a day. linaCLOtide (LINZESS) 145 mcg cap capsule Take 145 mcg by mouth nightly. Indications: chronic idiopathic constipation      Biotin 2,500 mcg cap Take 10,000 mcg by mouth daily. oxyCODONE IR (ROXICODONE) 10 mg tab immediate release tablet Take 10 mg by mouth as needed for Pain.      pediatric multivitamin no.76 (FLINTSTONES COMPLETE PO) Take 2 Tablets by mouth daily. carvediloL (COREG) 6.25 mg tablet Take 6.25 mg by mouth daily. Supposed to take daily, but only takes as needed when she has pain in arm      traZODone (DESYREL) 100 mg tablet Take 300 mg by mouth nightly as needed for Sleep. Takes 2-3 tabs at bedtime      buPROPion XL (WELLBUTRIN XL) 150 mg tablet Take 300 mg by mouth daily. Indications: anxiousness associated with depression      hydrOXYzine HCl (ATARAX) 25 mg tablet Take 25 mg by mouth three (3) times daily as needed for Anxiety. vilazodone (VIIBRYD) 40 mg tab tablet Take 40 mg by mouth daily. Indications: major depressive disorder      calcium citrate 250 mg calcium tab tablet Take  by mouth.          Review of Systems:  General - Denies fatigue, fever, chills  Cardiac - Denies chest pain, palpitations, shortness of breath  Pulmonary - Denies shortness of breath or productive cough  Gastrointestinal - as noted above  Musculoskeletal - Denies joint or muscle weakness, pain, stiffness  Hematologic - Denies abnormal bleeding or bruising  Neurologic - Denies weakness, paralysis, numbness, tingling    Objective:     Visit Vitals  /86 (BP 1 Location: Left upper arm, BP Patient Position: Sitting, BP Cuff Size: Large adult)   Pulse 88   Temp 97.3 °F (36.3 °C)   Ht 5' 4\" (1.626 m)   Wt 82.7 kg (182 lb 6.4 oz)   LMP 03/04/2013   SpO2 100%   BMI 31.31 kg/m²        Physical Exam:      General appearance:  alert, cooperative, no distress, appears stated age   Mental status   alert, oriented to person, place, and time   Neck  supple, no significant adenopathy     Lymphatics  no palpable lymphadenopathy, no hepatosplenomegaly   Chest  clear to auscultation, no wheezes, rales or rhonchi, symmetric air entry   Heart  normal rate, regular rhythm, normal S1, S2, no murmurs, rubs, clicks or gallops    Abdomen: soft, nontender, nondistended, no masses or organomegaly   Incision:  Well healed, no hernias      Neurological  alert, oriented, normal speech, no focal findings or movement disorder noted   Musculoskeletal no joint tenderness, deformity or swelling   Extremities peripheral pulses normal, no pedal edema, no clubbing or cyanosis   Skin normal coloration and turgor, no rashes, no suspicious skin lesions noted            Labs:     No results found for this or any previous visit (from the past 2016 hour(s)). Recent Labs     08/16/21  0000   VITD3 23.9*       EGD 8/23/22:   Findings:   Oropharynx - normal mucosa without ulcer or obstruction of deformity   Esophagus - normal mucosa without rings, webs or other obstructions or abnormalities  Pouch - normal mucosa and adequate size without ulcer, erythema or other abnormalities  Anastomosis - minimal stricture with dilation as below with no ulcer disease or obstruction  [place]ma Limb -  normal mucosa without ulcer or obstruction of deformity       Therapies:    - balloon dilation to 20 mm  Assessment and Plan:   Intestinal malabsorption  continue required Vitamins: B12, B complex, D, iron, calcium, multivitamin  S/p laparoscopic bariatric surgery,laparoscopic gastric bypass surgery , history of morbid obesity. Reviewed weight loss progress and is doing well. Do recommend using food tracking lashawn to ensure adequate protein and caloric intake. Aim for 80-90 g protein daily and 800-1000 calories. Continue to increase exercise. Resume protein shakes  Sleep goal is 7-9 hours each night. Patient education given on the effects of sleep deprivation on weight control. Discussed patients weight loss goals and dietary choices in relation to goals. Reminded to measure portions, continue high protein, low carbohydrate diet. Reminded to eat regularly, to eat slowly & not to drink with meals. Continue cardio exercise and add resistance exercises. 60-90 minutes of aerobic activity 5 days a week and strength training 2 days each week. Encouraged to attend support group   Required fluid intake is >64oz daily of decaffeinated sugar free beverages. 3. Hypovitaminosis D - completed D2 supplementation, now taking D3, recheck level  4. Anastomotic stricture - improving dysphagia, continue warm drink in morning, notify office if symptoms worsen    Patient to complete labs before next visit. Lab slip given today. I have discussed this plan with patient and they verbalized understanding    30 minutes spent with patient    Follow up in 6 months or sooner if patient has questions, concerns or worsening of condition, if unable to reach our office, patient should report to the ED. Ms. Hipolito Gallardo has a reminder for a \"due or due soon\" health maintenance. I have asked that she contact her primary care provider for a follow-up on this health maintenance. I have reviewed the patients history and issues with my physician extender and agree with the current treatment plan.     Denis King MD

## 2022-10-02 LAB
25(OH)D3+25(OH)D2 SERPL-MCNC: 54.3 NG/ML (ref 30–100)
ALBUMIN SERPL-MCNC: 4.2 G/DL (ref 3.8–4.8)
ALBUMIN/GLOB SERPL: 1.6 {RATIO} (ref 1.2–2.2)
ALP SERPL-CCNC: 95 IU/L (ref 44–121)
ALT SERPL-CCNC: 23 IU/L
AST SERPL-CCNC: 17 IU/L (ref 0–40)
BASOPHILS # BLD AUTO: 0 X10E3/UL
BASOPHILS NFR BLD AUTO: 1 %
BILIRUB SERPL-MCNC: <0.2 MG/DL (ref 0–1.2)
BUN SERPL-MCNC: 7 MG/DL
BUN/CREAT SERPL: 12
CALCIUM SERPL-MCNC: 8.8 MG/DL
CHLORIDE SERPL-SCNC: 101 MMOL/L (ref 96–106)
CO2 SERPL-SCNC: 22 MMOL/L (ref 20–29)
CREAT SERPL-MCNC: 0.58 MG/DL
EGFR: 102 ML/MIN/1.73
EOSINOPHIL # BLD AUTO: 0.2 X10E3/UL
EOSINOPHIL NFR BLD AUTO: 4 %
ERYTHROCYTE [DISTWIDTH] IN BLOOD BY AUTOMATED COUNT: 13.9 %
FERRITIN SERPL-MCNC: 30 NG/ML
FOLATE SERPL-MCNC: 16.6 NG/ML
GLOBULIN SER CALC-MCNC: 2.7 G/DL (ref 1.5–4.5)
GLUCOSE SERPL-MCNC: 96 MG/DL (ref 70–99)
HCT VFR BLD AUTO: 38.7 %
HGB BLD-MCNC: 12.5 G/DL
IMM GRANULOCYTES # BLD AUTO: 0 X10E3/UL
IMM GRANULOCYTES NFR BLD AUTO: 0 %
IRON SERPL-MCNC: 46 UG/DL
LYMPHOCYTES # BLD AUTO: 1.9 X10E3/UL
LYMPHOCYTES NFR BLD AUTO: 44 %
MCH RBC QN AUTO: 27 PG
MCHC RBC AUTO-ENTMCNC: 32.3 G/DL
MCV RBC AUTO: 84 FL
MONOCYTES # BLD AUTO: 0.4 X10E3/UL
MONOCYTES NFR BLD AUTO: 10 %
NEUTROPHILS # BLD AUTO: 1.8 X10E3/UL
NEUTROPHILS NFR BLD AUTO: 41 %
PLATELET # BLD AUTO: 313 X10E3/UL (ref 150–450)
POTASSIUM SERPL-SCNC: 3.5 MMOL/L (ref 3.5–5.2)
PROT SERPL-MCNC: 6.9 G/DL (ref 6–8.5)
RBC # BLD AUTO: 4.63 X10E6/UL
SODIUM SERPL-SCNC: 138 MMOL/L (ref 134–144)
VIT B1 BLD-SCNC: 167 NMOL/L (ref 66.5–200)
VIT B12 SERPL-MCNC: >2000 PG/ML (ref 232–1245)
WBC # BLD AUTO: 4.3 X10E3/UL (ref 3.4–10.8)

## 2022-10-05 ENCOUNTER — HOSPITAL ENCOUNTER (EMERGENCY)
Age: 62
Discharge: HOME | End: 2022-10-05
Attending: EMERGENCY MEDICINE
Payer: MEDICARE

## 2022-10-05 VITALS
BODY MASS INDEX: 30.73 KG/M2 | HEIGHT: 64 IN | RESPIRATION RATE: 20 BRPM | TEMPERATURE: 98 F | HEART RATE: 80 BPM | SYSTOLIC BLOOD PRESSURE: 107 MMHG | OXYGEN SATURATION: 100 % | DIASTOLIC BLOOD PRESSURE: 89 MMHG | WEIGHT: 180 LBS

## 2022-10-05 DIAGNOSIS — G43.009 MIGRAINE WITHOUT AURA AND WITHOUT STATUS MIGRAINOSUS, NOT INTRACTABLE: Primary | ICD-10-CM

## 2022-10-05 PROCEDURE — 96374 THER/PROPH/DIAG INJ IV PUSH: CPT

## 2022-10-05 PROCEDURE — 96375 TX/PRO/DX INJ NEW DRUG ADDON: CPT

## 2022-10-05 PROCEDURE — 74011250636 HC RX REV CODE- 250/636: Performed by: EMERGENCY MEDICINE

## 2022-10-05 PROCEDURE — 99284 EMERGENCY DEPT VISIT MOD MDM: CPT

## 2022-10-05 RX ORDER — KETOROLAC TROMETHAMINE 15 MG/ML
15 INJECTION, SOLUTION INTRAMUSCULAR; INTRAVENOUS
Status: COMPLETED | OUTPATIENT
Start: 2022-10-05 | End: 2022-10-05

## 2022-10-05 RX ORDER — METOCLOPRAMIDE HYDROCHLORIDE 5 MG/ML
10 INJECTION INTRAMUSCULAR; INTRAVENOUS
Status: COMPLETED | OUTPATIENT
Start: 2022-10-05 | End: 2022-10-05

## 2022-10-05 RX ORDER — DIPHENHYDRAMINE HYDROCHLORIDE 50 MG/ML
25 INJECTION, SOLUTION INTRAMUSCULAR; INTRAVENOUS
Status: COMPLETED | OUTPATIENT
Start: 2022-10-05 | End: 2022-10-05

## 2022-10-05 RX ORDER — BUTALBITAL, ACETAMINOPHEN AND CAFFEINE 300; 40; 50 MG/1; MG/1; MG/1
1 CAPSULE ORAL
Qty: 12 CAPSULE | Refills: 0 | Status: SHIPPED | OUTPATIENT
Start: 2022-10-05

## 2022-10-05 RX ORDER — METOCLOPRAMIDE 10 MG/1
10 TABLET ORAL
Qty: 12 TABLET | Refills: 0 | Status: SHIPPED | OUTPATIENT
Start: 2022-10-05 | End: 2022-10-15

## 2022-10-05 RX ADMIN — KETOROLAC TROMETHAMINE 15 MG: 15 INJECTION, SOLUTION INTRAMUSCULAR; INTRAVENOUS at 07:29

## 2022-10-05 RX ADMIN — DIPHENHYDRAMINE HYDROCHLORIDE 25 MG: 50 INJECTION, SOLUTION INTRAMUSCULAR; INTRAVENOUS at 07:30

## 2022-10-05 RX ADMIN — METOCLOPRAMIDE 10 MG: 5 INJECTION, SOLUTION INTRAMUSCULAR; INTRAVENOUS at 07:30

## 2022-10-05 NOTE — DISCHARGE INSTRUCTIONS
Rest, avoid vigorous activity, bright lights or loud noises, excessive brain stimulation. Take chronic migraine therapy, can supplement with Excedrin Migraine, ibuprofen. I have also prescribed you Reglan for nausea and migraine relief. Your neurologist as planned to discuss chronic management.

## 2022-10-05 NOTE — ED PROVIDER NOTES
EMERGENCY DEPARTMENT HISTORY AND PHYSICAL EXAM      Date: 10/5/2022  Patient Name: Xander Ruggiero    History of Presenting Illness     Chief Complaint   Patient presents with    Migraine       History (Context): Xander Ruggiero is a 58 y.o. female with positive history of migraine headaches since age 15, and a complicated set of comorbid conditions as noted below, who presents with subacute onset, progressive, severe, right-sided pulsating headache radiating down right side of neck exacerbated by lying flat, relieved by nothing. Has taken Ubilify x 2, Excedrin x 1-2 at home with some resolution but ran out, then gave injection of Eliviq x 1 month. Woke up with headache. Worsened this month. No vision changes. The patient does not have associated neck stiffness. No recent syncope, head trauma. No prior of brain bleeds, stroke. PCP: Jess Nicholson, DO    Current Outpatient Medications   Medication Sig Dispense Refill    metoclopramide HCl (Reglan) 10 mg tablet Take 1 Tablet by mouth every six (6) hours as needed for Nausea or Headache for up to 10 days. 12 Tablet 0    butalbital-acetaminophen-caff (Fioricet) -40 mg per capsule Take 1 Capsule by mouth every four (4) hours as needed (Take 1-2 tablets ever 4 hours as need for pain. Not to exceed 6 tablets in a 24 hour period. ). 12 Capsule 0    calcium citrate 250 mg calcium tab tablet Take  by mouth. dicyclomine (BENTYL) 10 mg capsule Take 10 mg by mouth 4 times daily (before meals and nightly). nystatin (MYCOSTATIN) topical cream Apply  to affected area two (2) times a day. 30 g 2    omeprazole (PRILOSEC) 20 mg capsule TAKE 1 CAPSULE BY MOUTH EVERY DAY 30 Capsule 0    cyanocobalamin (VITAMIN B12) 1,000 mcg/mL injection 1 mL by SubCUTAneous route every thirty (30) days. Indications: inadequate vitamin B12 1 mL 3    Insulin Syringe-Needle U-100 1 mL 25 gauge x 5/8\" syrg 1 Syringe by Does Not Apply route every month.  12 Each 0    potassium chloride (K-DUR, KLOR-CON M20) 20 mEq tablet Take 20 mEq by mouth two (2) times a day. cholecalciferol (VITAMIN D3) (5000 Units/125 mcg) tab tablet Take 5,000 Units by mouth daily. cyclobenzaprine (FLEXERIL) 5 mg tablet Take 5 mg by mouth as needed for Muscle Spasm(s). erenumab-aooe (Aimovig Autoinjector) 140 mg/mL injection 140 mg by SubCUTAneous route every thirty (30) days. Indications: migraine prevention      celecoxib (CELEBREX) 200 mg capsule Take 200 mg by mouth two (2) times a day. linaCLOtide (LINZESS) 145 mcg cap capsule Take 145 mcg by mouth nightly. Indications: chronic idiopathic constipation      Biotin 2,500 mcg cap Take 10,000 mcg by mouth daily. oxyCODONE IR (ROXICODONE) 10 mg tab immediate release tablet Take 10 mg by mouth as needed for Pain.      pediatric multivitamin no.76 (FLINTSTONES COMPLETE PO) Take 2 Tablets by mouth daily. carvediloL (COREG) 6.25 mg tablet Take 6.25 mg by mouth daily. Supposed to take daily, but only takes as needed when she has pain in arm      traZODone (DESYREL) 100 mg tablet Take 300 mg by mouth nightly as needed for Sleep. Takes 2-3 tabs at bedtime      buPROPion XL (WELLBUTRIN XL) 150 mg tablet Take 300 mg by mouth daily. Indications: anxiousness associated with depression      hydrOXYzine HCl (ATARAX) 25 mg tablet Take 25 mg by mouth three (3) times daily as needed for Anxiety. vilazodone (VIIBRYD) 40 mg tab tablet Take 40 mg by mouth daily.  Indications: major depressive disorder         Past History     Past Medical History:  Past Medical History:   Diagnosis Date    Anemia 1965    Anxiety 2010    on meds    Arthritis 2012    Osteo-legs and wrists    Bipolar disorder ([facility] 2012    CAD (coronary artery disease) 2005    Chronic pain 2019    legs, low back    Depression     GERD (gastroesophageal reflux disease)     Hypercholesteremia 2012    not on meds currently    Hypertension 2005    Migraine 1972    Sickle cell trait ([facility] 1960    born with it       Past Surgical History:  Past Surgical History:   Procedure Laterality Date    HX  SECTION      HX CHOLECYSTECTOMY  2005    laparoscopic    HX GI  2013    sleeve resection    HX KNEE ARTHROSCOPY Left 2010     x 2    HX KNEE REPLACEMENT  2011    total    HX LAP GASTRIC BYPASS  2022    Dr Gatito Camarillo, conversion from sleeve    HX ORTHOPAEDIC Left 2016    left knee revision x2    HX ORTHOPAEDIC Right     right knee revision x4    HX ORTHOPAEDIC Right     broken femur    HX TUBAL LIGATION      [facility] BREAST SURGERY PROCEDURE UNLISTED Left     I&D of cyst       Family History:  Family History   Problem Relation Age of Onset    Heart Disease Mother         Mom was told she had irreg heart beat/hole in heart , [place]    Breast Cancer Mother     Other Other         Non- contributory    Breast Cancer Maternal Grandmother     Malignant Hyperthermia Neg Hx     Pseudocholinesterase Deficiency Neg Hx     Delayed Awakening Neg Hx     Post-op Nausea/Vomiting Neg Hx     Emergence Delirium Neg Hx     Post-op Cognitive Dysfunction Neg Hx        Social History:  Social History     Tobacco Use    Smoking status: Former     Packs/day: 0.25     Years: 35.00     Pack years: 8.75     Types: Cigarettes     Quit date:      Years since quittin.7     Passive exposure: Never    Smokeless tobacco: Never   Vaping Use    Vaping Use: Former   Substance Use Topics    Alcohol use: Not Currently     Comment: 5 glasses wine per month    Drug use: Not Currently     Types: Cocaine     Comment: last used in        Allergies: Allergies   Allergen Reactions    Tylenol [Acetaminophen] Itching, Nausea Only and Other (comments)     Patient also reported burning       PMH, PSH, family history, social history, allergies reviewed with the patient with significant items noted above.   Review of Systems   Six-point review of systems negative except stated above    Physical Exam     Vitals: 10/05/22 0611   BP: 137/89   Pulse: 80   Resp: 20   Temp: 98 °F (36.7 °C)   SpO2: 100%   Weight: 81.6 kg (180 lb)   Height: 5' 4\" (1.626 m)       Gen: In clear pain  HEENT: Normocephalic, sclera anicteric  Cardiovascular: Normal rate, regular rhythm, no murmurs, rubs, gallops. Pulses intact and equal distally. Pulmonary: No respiratory distress. No stridor. Clear lungs. ABD: Soft, nontender, nondistended. Neuro: Alert and oriented x3, cranial nerves grossly intact, normal facial symmetry, full strength and sensation throughout. Psych: Normal thought content and thought processes. EXT: Moves all extremities well. No cyanosis or clubbing. Skin: Warm and well-perfused. Diagnostic Study Results     Labs -   No results found for this or any previous visit (from the past 12 hour(s)). Radiologic Studies -   No orders to display     CT Results  (Last 48 hours)      None          CXR Results  (Last 48 hours)      None              Medical Decision Making   I am the first provider for this patient. I reviewed the vital signs, available nursing notes, past medical history, past surgical history, family history and social history. Vital Signs-Reviewed the patient's vital signs. Records Reviewed: Personally, on initial evaluation    MDM:   Patient's clinical condition most consistent with Acute Migraine, recurrent without aura. Other DDX considered but deemed less likely based on history, exam and any objective studies obtained includes: Intracranial Hemorrhage , meningitis, encephalitis, Cerebral Venous Thrombosis, CVA. Plan:     Pain Control with IV Reglan, Toradol. Patient significantly improved after reassessment. Ready to be discharged home. Plan is to trial Fioricet as needed for recurrent headache symptoms, Reglan for rescue therapy. Patient will follow up with neurologist later today or tomorrow.     Orders as below:  Orders Placed This Encounter    metoclopramide HCl (REGLAN) injection 10 mg    diphenhydrAMINE (BENADRYL) injection 25 mg    ketorolac (TORADOL) injection 15 mg    metoclopramide HCl (Reglan) 10 mg tablet    butalbital-acetaminophen-caff (Fioricet) -40 mg per capsule          Patient condition at time of disposition: improved    DISCHARGE NOTE:   Pt has been reexamined. Patient has no new complaints, changes, or physical findings. Care plan outlined and precautions discussed. Results were reviewed with the patient. All medications were reviewed with the patient; will d/c home with daughter. All of pt's questions and concerns were addressed. Alarm symptoms and return precautions associated with chief complaint and evaluation were reviewed with the patient in detail. The patient demonstrated adequate understanding. Patient was instructed and agrees to follow up with Neurologist, Dr. Leidy Juarez, as well as to return to the ED upon further deterioration. Patient is ready to go home. Follow-up Information       Follow up With Specialties Details Why Valentina, [address] [place] [address], DO Internal Medicine Physician In 1 week  [address] [place] [address]  [phone]      Neurologist  In 3 days              Current Discharge Medication List        START taking these medications    Details   metoclopramide HCl (Reglan) 10 mg tablet Take 1 Tablet by mouth every six (6) hours as needed for Nausea or Headache for up to 10 days. Qty: 12 Tablet, Refills: 0  Start date: 10/5/2022, End date: 10/15/2022      butalbital-acetaminophen-caff (Fioricet) -40 mg per capsule Take 1 Capsule by mouth every four (4) hours as needed (Take 1-2 tablets ever 4 hours as need for pain. Not to exceed 6 tablets in a 24 hour period. ). Qty: 12 Capsule, Refills: 0  Start date: 10/5/2022             Procedures:  Procedures      Disposition: discharge home    Diagnosis     Clinical Impression:   1.  Migraine without aura and without status migrainosus, not intractable Signed,  Mik Maya D.O. Emergency Physician  Sarah    As a voice dictation software was utilized to dictate this note, minor word transpositions can occur. I apologize for confusing wording and typographic errors. Please feel free to contact me for clarification.

## 2022-10-07 RX ORDER — OMEPRAZOLE 20 MG/1
CAPSULE, DELAYED RELEASE ORAL
Qty: 30 CAPSULE | Refills: 0 | Status: SHIPPED | OUTPATIENT
Start: 2022-10-07 | End: 2022-10-26

## 2022-10-20 NOTE — TELEPHONE ENCOUNTER
Phone call with pt and she wanted a refill on her nystatin cream, pt aware she has two refills left on her Rx and to call her pharmacy. Pt expresses understanding.

## 2022-10-26 RX ORDER — OMEPRAZOLE 20 MG/1
CAPSULE, DELAYED RELEASE ORAL
Qty: 30 CAPSULE | Refills: 0 | Status: SHIPPED | OUTPATIENT
Start: 2022-10-26 | End: 2022-11-28

## 2022-11-28 RX ORDER — OMEPRAZOLE 20 MG/1
CAPSULE, DELAYED RELEASE ORAL
Qty: 30 CAPSULE | Refills: 0 | Status: SHIPPED | OUTPATIENT
Start: 2022-11-28

## 2022-12-12 RX ORDER — OMEPRAZOLE 20 MG/1
CAPSULE, DELAYED RELEASE ORAL
Qty: 30 CAPSULE | Refills: 0 | Status: SHIPPED | OUTPATIENT
Start: 2022-12-12

## 2023-01-18 RX ORDER — OMEPRAZOLE 20 MG/1
CAPSULE, DELAYED RELEASE ORAL
Qty: 30 CAPSULE | Refills: 0 | Status: SHIPPED | OUTPATIENT
Start: 2023-01-18

## 2023-02-24 RX ORDER — OMEPRAZOLE 20 MG/1
CAPSULE, DELAYED RELEASE ORAL
Qty: 30 CAPSULE | Refills: 5 | Status: SHIPPED | OUTPATIENT
Start: 2023-02-24

## 2023-03-08 ENCOUNTER — APPOINTMENT (OUTPATIENT)
Facility: HOSPITAL | Age: 63
End: 2023-03-08
Payer: MEDICARE

## 2023-03-08 ENCOUNTER — HOSPITAL ENCOUNTER (EMERGENCY)
Facility: HOSPITAL | Age: 63
Discharge: HOME | End: 2023-03-08
Attending: EMERGENCY MEDICINE
Payer: MEDICARE

## 2023-03-08 VITALS
OXYGEN SATURATION: 100 % | HEIGHT: 64 IN | RESPIRATION RATE: 18 BRPM | TEMPERATURE: 98.3 F | SYSTOLIC BLOOD PRESSURE: 130 MMHG | WEIGHT: 172 LBS | BODY MASS INDEX: 29.37 KG/M2 | HEART RATE: 88 BPM | DIASTOLIC BLOOD PRESSURE: 96 MMHG

## 2023-03-08 DIAGNOSIS — S46.811A STRAIN OF RIGHT TRAPEZIUS MUSCLE, INITIAL ENCOUNTER: Primary | ICD-10-CM

## 2023-03-08 PROCEDURE — 73030 X-RAY EXAM OF SHOULDER: CPT

## 2023-03-08 PROCEDURE — 6360000002 HC RX W HCPCS: Performed by: EMERGENCY MEDICINE

## 2023-03-08 PROCEDURE — 96372 THER/PROPH/DIAG INJ SC/IM: CPT | Performed by: EMERGENCY MEDICINE

## 2023-03-08 PROCEDURE — 99284 EMERGENCY DEPT VISIT MOD MDM: CPT | Performed by: EMERGENCY MEDICINE

## 2023-03-08 PROCEDURE — 6370000000 HC RX 637 (ALT 250 FOR IP): Performed by: EMERGENCY MEDICINE

## 2023-03-08 RX ORDER — NAPROXEN 500 MG/1
500 TABLET ORAL 2 TIMES DAILY PRN
Qty: 30 TABLET | Refills: 5 | Status: SHIPPED | OUTPATIENT
Start: 2023-03-08 | End: 2023-03-18

## 2023-03-08 RX ORDER — KETOROLAC TROMETHAMINE 15 MG/ML
15 INJECTION, SOLUTION INTRAMUSCULAR; INTRAVENOUS ONCE
Status: COMPLETED | OUTPATIENT
Start: 2023-03-08 | End: 2023-03-08

## 2023-03-08 RX ORDER — LIDOCAINE 50 MG/G
1 PATCH TOPICAL DAILY
Qty: 10 PATCH | Refills: 1 | Status: SHIPPED | OUTPATIENT
Start: 2023-03-08 | End: 2023-03-18

## 2023-03-08 RX ORDER — LIDOCAINE 4 G/G
1 PATCH TOPICAL DAILY
Status: DISCONTINUED | OUTPATIENT
Start: 2023-03-08 | End: 2023-03-08 | Stop reason: HOSPADM

## 2023-03-08 RX ADMIN — KETOROLAC TROMETHAMINE 15 MG: 15 INJECTION, SOLUTION INTRAMUSCULAR; INTRAVENOUS at 08:03

## 2023-03-08 ASSESSMENT — PAIN DESCRIPTION - LOCATION: LOCATION: SHOULDER

## 2023-03-08 ASSESSMENT — PAIN DESCRIPTION - ORIENTATION: ORIENTATION: RIGHT

## 2023-03-08 ASSESSMENT — PAIN SCALES - GENERAL: PAINLEVEL_OUTOF10: 8

## 2023-03-08 NOTE — ED PROVIDER NOTES
EMERGENCY DEPARTMENT HISTORY AND PHYSICAL EXAM      Date: 3/8/2023  Patient Name: José Manuel Avila      History of Presenting Illness     Chief Complaint   Patient presents with    Shoulder Pain       Location/Duration/Severity/Modifying factors   Chief Complaint   Patient presents with    Shoulder Pain       HPI:  José Manuel Avila is a 61 y.o. female with PMH significant for hypertension presents with 3 days of right posterior shoulder/upper back pain. Symptoms are associated with tenderness to touch, pain with ranging of right shoulder joint, occasional radiation of the pain down right arm. Pt  denies numbness, tingling, weakness of right arm, facial droop, normal speech pattern, blunt trauma, falls to the area, swelling or deformity to the right shoulder . Treatments attempted at home include none. Patient mainly confirmed about a stroke. PCP: Salima Pearson DO    Current Facility-Administered Medications   Medication Dose Route Frequency Provider Last Rate Last Admin    lidocaine 4 % external patch 1 patch  1 patch TransDERmal Daily Hilaria Camacho DO   1 patch at 03/08/23 0802     Current Outpatient Medications   Medication Sig Dispense Refill    lidocaine (LIDODERM) 5 % Place 1 patch onto the skin daily for 10 days 12 hours on, 12 hours off.  10 patch 1    naproxen (NAPROSYN) 500 MG tablet Take 1 tablet by mouth 2 times daily as needed for Pain 30 tablet 5    Biotin 2.5 MG CAPS Take 10,000 mcg by mouth daily      buPROPion (WELLBUTRIN XL) 150 MG extended release tablet Take 300 mg by mouth daily      butalbital-APAP-caffeine -40 MG CAPS per capsule Take 1 capsule by mouth every 4 hours as needed      Calcium Citrate 1040 MG TABS Take by mouth      carvedilol (COREG) 6.25 MG tablet Take 6.25 mg by mouth daily      celecoxib (CELEBREX) 200 MG capsule Take 200 mg by mouth 2 times daily      vitamin D3 (CHOLECALCIFEROL) 125 MCG (5000 UT) TABS tablet Take 5,000 Units by mouth daily cyanocobalamin 1000 MCG/ML injection Inject 1,000 mcg into the skin every 30 days      cyclobenzaprine (FLEXERIL) 5 MG tablet Take 5 mg by mouth as needed      dicyclomine (BENTYL) 10 MG capsule Take 10 mg by mouth 4 times daily (before meals and nightly)      Erenumab-aooe (AIMOVIG) 140 MG/ML SOAJ Inject 140 mg into the skin every 30 days      hydrOXYzine HCl (ATARAX) 25 MG tablet Take 25 mg by mouth 3 times daily as needed      linaclotide (LINZESS) 145 MCG capsule Take 145 mcg by mouth      nystatin (MYCOSTATIN) 326658 UNIT/GM cream Apply topically 2 times daily      omeprazole (PRILOSEC) 20 MG delayed release capsule TAKE 1 CAPSULE BY MOUTH EVERY DAY      oxyCODONE HCl (OXY-IR) 10 MG immediate release tablet Take 10 mg by mouth as needed.       potassium chloride (KLOR-CON M) 20 MEQ extended release tablet Take 20 mEq by mouth 2 times daily      traZODone (DESYREL) 100 MG tablet Take 300 mg by mouth      vilazodone HCl (VIIBRYD) 40 MG TABS Take 40 mg by mouth daily      omeprazole (PRILOSEC) 20 MG delayed release capsule TAKE 1 CAPSULE BY MOUTH EVERY DAY 30 capsule 5       Past History     Past Medical History:  Past Medical History:   Diagnosis Date    Anemia 1965    Anxiety 2010    on meds    Arthritis 2012    Osteo-legs and wrists    Bipolar disorder ([address] 2012    CAD (coronary artery disease) 2005    Chronic pain 2019    legs, low back    Depression     GERD (gastroesophageal reflux disease)     Hypercholesteremia 2012    not on meds currently    Hypertension 2005    Migraine 1972    Sickle cell trait ([address] 1960    born with it       Past Surgical History:  Past Surgical History:   Procedure Laterality Date    BREAST SURGERY Left 2012    I&D of cyst     SECTION      CHOLECYSTECTOMY  2005    laparoscopic    GASTRIC BYPASS SURGERY  2022    Dr Chasity Dong, conversion from sleeve    GI  2013    sleeve resection    KNEE ARTHROSCOPY Left 2010     x 2    ORTHOPEDIC SURGERY Left 2016    left knee revision x2    ORTHOPEDIC SURGERY Right     right knee revision x4    ORTHOPEDIC SURGERY Right 2014    broken femur    TOTAL KNEE ARTHROPLASTY      total    TUBAL LIGATION         Family History:  Family History   Problem Relation Age of Onset    Breast Cancer Maternal Grandmother     Post-op Cognitive Dysfunction Neg Hx     Emergence Delirium Neg Hx     Post-op Nausea/Vomiting Neg Hx     Breast Cancer Mother     Pseudochol. Deficiency Neg Hx     Malig Hypertherm Neg Hx     Heart Disease Mother         Mom was told she had irreg heart beat/hole in heart , [place]    Other Other         Non- contributory    Delayed Awakening Neg Hx        Social History:  Social History     Tobacco Use    Smoking status: Former     Packs/day: 0.25     Types: Cigarettes     Quit date: 2017     Years since quittin.1    Smokeless tobacco: Never   Substance Use Topics    Alcohol use: Not Currently    Drug use: Not Currently     Types: Cocaine       Allergies: Allergies   Allergen Reactions    Acetaminophen Itching, Nausea Only and Other (See Comments)     Patient also reported burning    Percocet [Oxycodone-Acetaminophen] Hives         Physical Exam     General: Patient is awake and alert, resting comfortably in no acute distress  Head: Normocephalic and atraumatic  Eyes: Extraocular muscles intact, no conjunctival pallor  Ear, nose, throat: Normal external exam  Cardiovascular: RRR, warm, well-perfused extremities. Respiratory: Patient is in no respiratory distress, normal respiratory effort  GI: soft, non-tender, non-distended  MSK: Localized tenderness palpation over right upper over the trapezius muscle, soft tissue over the scaphoid bone, no palpable scaphoid deformity, no palpable pulse or rib tenderness or deformity, no deformity of AC joint bilaterally, normal, rounded right shoulder joint, full range of motion of right shoulder joint with pain with internal rotation. Skin: Warm, dry, and intact  Neuro:  The patient is alert and oriented, full strength in bilateral upper extremities including , bicep, shoulder abduction. Sensation intact light touch other than slightly diminished to the right radial distribution compared to left. No gross cranial nerve deficits, normal facial asymmetry, normal speech pattern  Psych: Appropriate mood and affect. Lab and Diagnostic Study Results     Labs -  No results found for this or any previous visit (from the past 24 hour(s)). Radiologic studies interpreted by me include right shoulder plain films which show no fracture, dislocation    Radiologic Studies -   XR SHOULDER RIGHT (MIN 2 VIEWS)   Final Result   No acute abnormality. Subacromial enthesophyte, which can predispose   to subacromial impingement. Procedures and Critical Care           Performed by: Terell Mcdaniel,     Procedures       Terell Mcdaniel, DO    Medical Decision Making and ED Course   - I am the first and primary provider for this patient AND AM THE PRIMARY PROVIDER OF RECORD. - I reviewed the vital signs, available nursing notes, past medical history, past surgical history, family history and social history. - Initial assessment performed. The patients presenting problems have been discussed, and the staff are in agreement with the care plan formulated and outlined with them. I have encouraged them to ask questions as they arise throughout their visit. Vital Signs-Reviewed the patient's vital signs. Patient Vitals for the past 12 hrs:   Temp Pulse Resp BP SpO2   03/08/23 0615 98.3 °F (36.8 °C) 88 18 (!) 130/96 100 %         Provider Notes (Medical Decision Making):   Initial differential diagnosis included: Acute right trapezius versus rhomboid muscle strain, right rotator cuff injury, osteoarthritis.      Clinical presentation most consistent with acute right muscle strain of the upper back, likely trapezius versus rhomboid muscle based on history and exam. Critical diagnoses also considered but deemed unlikely include shoulder dislocation, bursitis, AC joint separation, pneumothorax, CVA. Notable laboratory findings include none. Medications administered during this ED encounter include intramuscular Toradol and Lidoderm patch. Admission considered but deemed unnecessary due to absence of concern for CVA, pneumothorax, dislocation, complicated orthopedic injury requiring surgery urgently. Disposition and treatment plan is topical anti-inflammatory, ice and heat, topical Lidoderm patch, stretching exercises. Pt comfortable with this plan. [facility]         ED Course:          ------------------------------------------------------------------------------------------------------------        Disposition         DISPOSITION Decision To Discharge 03/08/2023 07:52:49 AM        Diagnosis     Clinical Impression:   1. Strain of right trapezius muscle, initial encounter        Attestations:    Aidan Vázquez D.O.   Emergency Physician  [place], [place]  03/08/23 7206

## 2023-03-28 ENCOUNTER — OFFICE VISIT (OUTPATIENT)
Age: 63
End: 2023-03-28
Payer: MEDICARE

## 2023-03-28 VITALS
HEART RATE: 93 BPM | HEIGHT: 64 IN | BODY MASS INDEX: 29.16 KG/M2 | WEIGHT: 170.8 LBS | DIASTOLIC BLOOD PRESSURE: 81 MMHG | SYSTOLIC BLOOD PRESSURE: 138 MMHG | TEMPERATURE: 97.3 F | OXYGEN SATURATION: 100 %

## 2023-03-28 DIAGNOSIS — Z98.84 S/P GASTRIC BYPASS: ICD-10-CM

## 2023-03-28 DIAGNOSIS — K90.9 INTESTINAL MALABSORPTION, UNSPECIFIED TYPE: Primary | ICD-10-CM

## 2023-03-28 DIAGNOSIS — E55.9 VITAMIN D DEFICIENCY, UNSPECIFIED: ICD-10-CM

## 2023-03-28 PROCEDURE — G8484 FLU IMMUNIZE NO ADMIN: HCPCS | Performed by: NURSE PRACTITIONER

## 2023-03-28 PROCEDURE — 99214 OFFICE O/P EST MOD 30 MIN: CPT | Performed by: NURSE PRACTITIONER

## 2023-03-28 PROCEDURE — 3079F DIAST BP 80-89 MM HG: CPT | Performed by: NURSE PRACTITIONER

## 2023-03-28 PROCEDURE — 4004F PT TOBACCO SCREEN RCVD TLK: CPT | Performed by: NURSE PRACTITIONER

## 2023-03-28 PROCEDURE — G8419 CALC BMI OUT NRM PARAM NOF/U: HCPCS | Performed by: NURSE PRACTITIONER

## 2023-03-28 PROCEDURE — 3017F COLORECTAL CA SCREEN DOC REV: CPT | Performed by: NURSE PRACTITIONER

## 2023-03-28 PROCEDURE — G8428 CUR MEDS NOT DOCUMENT: HCPCS | Performed by: NURSE PRACTITIONER

## 2023-03-28 PROCEDURE — 3075F SYST BP GE 130 - 139MM HG: CPT | Performed by: NURSE PRACTITIONER

## 2023-03-28 RX ORDER — VITAMIN B COMPLEX
1 CAPSULE ORAL DAILY
COMMUNITY

## 2023-03-28 RX ORDER — M-VIT,TX,IRON,MINS/CALC/FOLIC 27MG-0.4MG
1 TABLET ORAL DAILY
COMMUNITY

## 2023-03-28 RX ORDER — BREXPIPRAZOLE 2 MG/1
2 TABLET ORAL DAILY
COMMUNITY
Start: 2023-01-07

## 2023-03-28 NOTE — PATIENT INSTRUCTIONS
etc.)  - Steroid pills or injections  - Smoke (cigarettes or recreational drugs)  - Alcohol  Use of any of the above may cause ulcers in your stomach which may perforate causing a medical emergency and surgery. Speak to our medical staff if another medical provider requires you to take steroids or NSAIDs. Supplement Resource Guide    Importance of Protein:   Maintains lean body mass, produces antibodies to fight off infections, heals wounds, minimizes hair loss, helps to give you energy, helps with satiety, and keeping you full between meals. Importance of Calcium:  Needed for healthy bones and teeth, normal blood clotting, and nervous system functioning, higher risk of osteoporosis and bone disease with non-compliance. Importance of Multivitamins: Many functions. Supply you with extra nutrients that you may be missing from food. May lead to iron deficiency anemia, weakness, fatigue, and many other symptoms with non-compliance. Importance of B Vitamins:  Important for red blood cell formation, metabolism, energy, and helps to maintain a healthy nervous system. Protein Supplement  Find one you like now. Use immediately after surgery. Look for:  35-50g protein each day from your protein supplement once you reach the progression diet. 0-3 g fat per serving  0-3 g sugar per serving    Protein drinks should be split in separate dosages. Recommend: Lifelong  1 year + Calcium Supplement:     Start taking within a month after surgery. Look for: Calcium Citrate Plus D (1500 mg per day)  Recommend: Citracal     .            Avoid chocolate chewable calcium. Can use chewable bariatric or [employer] brand or similar chewable. The body cannot absorb more than 500-600 mg of calcium at a time. Take for Life Multi-vitamin Supplement:      Start immediately after surgery: any complete chewable, such as: [place]s Complete chewables. Avoid [place] sours or gummies.   They lack iron and

## 2023-03-28 NOTE — PROGRESS NOTES
Subjective:   Aura Mcclure  is a 61 y.o. female who presents for follow-up about 1 year following  conversion sleeve gastrectomy to gastric bypass with lysis of adhesions greater than 45 minutes and small bowel resection . Surgery related complication: NA, but minimal stricture at 5.5 months postop. She has lost a total of 53 pounds since surgery. Body mass index is 29.32 kg/m². [facility] Loss of EBW is 58%. Had EGD at 5.5 months postop with balloon dilation to 20mm    The patient presents today to assess their progress toward their weight loss goal & to address any issues that may be present:   She is tolerating solid foods without difficulty, reports no real issues and denies vomiting, abdominal pain, difficulty swallowing, nausea and reflux. Fluid intake: good                              Protein intake:  resuming protein shakes; chicken, fish, ground turkey or beef, yogurt, nuts, cheese  Eating about 2x/day  The patient is taking recommended vitamins. The patient's exercise level: increased her walking, no structured exercise    Changes in her medical history and medications have been reviewed. Comorbidities:    Hypertension: improved , off medications  Diabetes: not applicable  Obstructive Sleep Apnea: not applicable  Hyperlipidemia: not applicable  Stress Urinary Incontinence: not applicable  Gastroesophageal Reflux: not applicable  Weight related arthropathy:improved    Patient Active Problem List   Diagnosis    Complication of internal knee prosthesis ([address]    Abnormal EKG    Hypovitaminosis D    Severe obesity (BMI 35.0-39. 9) with comorbidity (HCC)    Migraine    Arthritic-like pain    Chronic gastritis    Low vitamin B12 level    Intestinal malabsorption    H. pylori infection    Hx of smoking    Depression    Urinary retention    Osteoarthritis    Hypercholesteremia    Fracture of distal femur (HCC)    Sickle cell trait (HCC)    Hypertension    S/P laparoscopic sleeve gastrectomy

## 2023-04-06 ENCOUNTER — TELEPHONE (OUTPATIENT)
Age: 63
End: 2023-04-06

## 2023-07-28 ENCOUNTER — HOSPITAL ENCOUNTER (EMERGENCY)
Facility: HOSPITAL | Age: 63
Discharge: HOME | End: 2023-07-29
Payer: MEDICARE

## 2023-07-28 ENCOUNTER — APPOINTMENT (OUTPATIENT)
Facility: HOSPITAL | Age: 63
End: 2023-07-28
Payer: MEDICARE

## 2023-07-28 DIAGNOSIS — M54.12 CERVICAL RADICULOPATHY: Primary | ICD-10-CM

## 2023-07-28 DIAGNOSIS — E87.6 HYPOKALEMIA: ICD-10-CM

## 2023-07-28 DIAGNOSIS — D64.9 ANEMIA, UNSPECIFIED TYPE: ICD-10-CM

## 2023-07-28 DIAGNOSIS — M79.602 LEFT ARM PAIN: ICD-10-CM

## 2023-07-28 LAB
ALBUMIN SERPL-MCNC: 3 G/DL (ref 3.4–5)
ALBUMIN/GLOB SERPL: 0.8 (ref 0.8–1.7)
ALP SERPL-CCNC: 75 U/L (ref 45–117)
ALT SERPL-CCNC: 16 U/L (ref 13–56)
ANION GAP SERPL CALC-SCNC: 4 MMOL/L (ref 3–18)
AST SERPL-CCNC: 9 U/L (ref 10–38)
BASOPHILS # BLD: 0 K/UL (ref 0–0.1)
BASOPHILS NFR BLD: 0 % (ref 0–2)
BILIRUB SERPL-MCNC: 0.2 MG/DL (ref 0.2–1)
BUN SERPL-MCNC: 12 MG/DL (ref 7–18)
BUN/CREAT SERPL: 25 (ref 12–20)
CALCIUM SERPL-MCNC: 8.4 MG/DL (ref 8.5–10.1)
CHLORIDE SERPL-SCNC: 106 MMOL/L (ref 100–111)
CO2 SERPL-SCNC: 28 MMOL/L (ref 21–32)
CREAT SERPL-MCNC: 0.48 MG/DL (ref 0.6–1.3)
DIFFERENTIAL METHOD BLD: ABNORMAL
EOSINOPHIL # BLD: 0.1 K/UL (ref 0–0.4)
EOSINOPHIL NFR BLD: 2 % (ref 0–5)
ERYTHROCYTE [DISTWIDTH] IN BLOOD BY AUTOMATED COUNT: 14.5 % (ref 11.6–14.5)
GLOBULIN SER CALC-MCNC: 3.6 G/DL (ref 2–4)
GLUCOSE SERPL-MCNC: 99 MG/DL (ref 74–99)
HCT VFR BLD AUTO: 31.6 % (ref 35–45)
HGB BLD-MCNC: 10.7 G/DL (ref 12–16)
IMM GRANULOCYTES # BLD AUTO: 0 K/UL
IMM GRANULOCYTES NFR BLD AUTO: 0 %
LIPASE SERPL-CCNC: 58 U/L (ref 73–393)
LYMPHOCYTES # BLD: 3 K/UL (ref 0.9–3.6)
LYMPHOCYTES NFR BLD: 55 % (ref 21–52)
MCH RBC QN AUTO: 26.8 PG (ref 24–34)
MCHC RBC AUTO-ENTMCNC: 33.9 G/DL (ref 31–37)
MCV RBC AUTO: 79 FL (ref 78–100)
MONOCYTES # BLD: 0.6 K/UL (ref 0.05–1.2)
MONOCYTES NFR BLD: 10 % (ref 3–10)
NEUTS SEG # BLD: 1.8 K/UL (ref 1.8–8)
NEUTS SEG NFR BLD: 33 % (ref 40–73)
NRBC # BLD: 0 K/UL (ref 0–0.01)
NRBC BLD-RTO: 0 PER 100 WBC
PLATELET # BLD AUTO: 260 K/UL (ref 135–420)
PMV BLD AUTO: 10 FL (ref 9.2–11.8)
POTASSIUM SERPL-SCNC: 3.2 MMOL/L (ref 3.5–5.5)
PROT SERPL-MCNC: 6.6 G/DL (ref 6.4–8.2)
RBC # BLD AUTO: 4 M/UL (ref 4.2–5.3)
RBC MORPH BLD: ABNORMAL
SODIUM SERPL-SCNC: 138 MMOL/L (ref 136–145)
TROPONIN I SERPL HS-MCNC: 8 NG/L (ref 0–54)
TROPONIN I SERPL HS-MCNC: 8 NG/L (ref 0–54)
WBC # BLD AUTO: 5.5 K/UL (ref 4.6–13.2)
WBC MORPH BLD: ABNORMAL

## 2023-07-28 PROCEDURE — 71045 X-RAY EXAM CHEST 1 VIEW: CPT

## 2023-07-28 PROCEDURE — 93005 ELECTROCARDIOGRAM TRACING: CPT | Performed by: PHYSICIAN ASSISTANT

## 2023-07-28 PROCEDURE — 84484 ASSAY OF TROPONIN QUANT: CPT

## 2023-07-28 PROCEDURE — 80053 COMPREHEN METABOLIC PANEL: CPT

## 2023-07-28 PROCEDURE — 70450 CT HEAD/BRAIN W/O DYE: CPT

## 2023-07-28 PROCEDURE — 99285 EMERGENCY DEPT VISIT HI MDM: CPT

## 2023-07-28 PROCEDURE — 96374 THER/PROPH/DIAG INJ IV PUSH: CPT

## 2023-07-28 PROCEDURE — 85025 COMPLETE CBC W/AUTO DIFF WBC: CPT

## 2023-07-28 PROCEDURE — 72125 CT NECK SPINE W/O DYE: CPT

## 2023-07-28 PROCEDURE — 96375 TX/PRO/DX INJ NEW DRUG ADDON: CPT

## 2023-07-28 PROCEDURE — 2580000003 HC RX 258: Performed by: PHYSICIAN ASSISTANT

## 2023-07-28 PROCEDURE — 6360000002 HC RX W HCPCS: Performed by: PHYSICIAN ASSISTANT

## 2023-07-28 PROCEDURE — 83690 ASSAY OF LIPASE: CPT

## 2023-07-28 RX ORDER — MORPHINE SULFATE 4 MG/ML
4 INJECTION, SOLUTION INTRAMUSCULAR; INTRAVENOUS
Status: COMPLETED | OUTPATIENT
Start: 2023-07-28 | End: 2023-07-28

## 2023-07-28 RX ADMIN — MORPHINE SULFATE 4 MG: 4 INJECTION, SOLUTION INTRAMUSCULAR; INTRAVENOUS at 22:40

## 2023-07-28 RX ADMIN — WATER 125 MG: 1 INJECTION INTRAMUSCULAR; INTRAVENOUS; SUBCUTANEOUS at 22:41

## 2023-07-28 ASSESSMENT — LIFESTYLE VARIABLES
HOW OFTEN DO YOU HAVE A DRINK CONTAINING ALCOHOL: NEVER
HOW MANY STANDARD DRINKS CONTAINING ALCOHOL DO YOU HAVE ON A TYPICAL DAY: PATIENT DOES NOT DRINK

## 2023-07-28 ASSESSMENT — PAIN SCALES - GENERAL: PAINLEVEL_OUTOF10: 9

## 2023-07-28 NOTE — ED TRIAGE NOTES
Pt reports that on Sunday she developed pain in her left arm, hand, and right hip and lower back. Pt provider told her to come to the ED. Pt believes it is sciatica and denies any recent fall or injuries.

## 2023-07-29 VITALS
HEART RATE: 84 BPM | OXYGEN SATURATION: 98 % | SYSTOLIC BLOOD PRESSURE: 126 MMHG | WEIGHT: 167 LBS | BODY MASS INDEX: 28.51 KG/M2 | DIASTOLIC BLOOD PRESSURE: 74 MMHG | TEMPERATURE: 98.7 F | RESPIRATION RATE: 18 BRPM | HEIGHT: 64 IN

## 2023-07-29 LAB
EKG ATRIAL RATE: 75 BPM
EKG ATRIAL RATE: 76 BPM
EKG DIAGNOSIS: NORMAL
EKG DIAGNOSIS: NORMAL
EKG P AXIS: 23 DEGREES
EKG P AXIS: 61 DEGREES
EKG P-R INTERVAL: 158 MS
EKG P-R INTERVAL: 172 MS
EKG Q-T INTERVAL: 428 MS
EKG Q-T INTERVAL: 438 MS
EKG QRS DURATION: 74 MS
EKG QRS DURATION: 86 MS
EKG QTC CALCULATION (BAZETT): 481 MS
EKG QTC CALCULATION (BAZETT): 489 MS
EKG R AXIS: 15 DEGREES
EKG R AXIS: 45 DEGREES
EKG T AXIS: 14 DEGREES
EKG T AXIS: 27 DEGREES
EKG VENTRICULAR RATE: 75 BPM
EKG VENTRICULAR RATE: 76 BPM

## 2023-07-29 PROCEDURE — 6370000000 HC RX 637 (ALT 250 FOR IP): Performed by: PHYSICIAN ASSISTANT

## 2023-07-29 RX ORDER — OXYCODONE HYDROCHLORIDE 5 MG/1
5 TABLET ORAL
Status: COMPLETED | OUTPATIENT
Start: 2023-07-29 | End: 2023-07-29

## 2023-07-29 RX ORDER — OXYCODONE HYDROCHLORIDE 5 MG/1
5 TABLET ORAL EVERY 6 HOURS PRN
Qty: 12 TABLET | Refills: 0 | Status: SHIPPED | OUTPATIENT
Start: 2023-07-29 | End: 2023-08-01

## 2023-07-29 RX ORDER — METHYLPREDNISOLONE 4 MG/1
TABLET ORAL
Qty: 1 KIT | Refills: 0 | Status: SHIPPED | OUTPATIENT
Start: 2023-07-29 | End: 2023-08-04

## 2023-07-29 RX ORDER — METHOCARBAMOL 750 MG/1
750 TABLET, FILM COATED ORAL 4 TIMES DAILY
Qty: 40 TABLET | Refills: 0 | Status: SHIPPED | OUTPATIENT
Start: 2023-07-29 | End: 2023-08-08

## 2023-07-29 RX ADMIN — POTASSIUM BICARBONATE 20 MEQ: 782 TABLET, EFFERVESCENT ORAL at 00:21

## 2023-07-29 RX ADMIN — OXYCODONE HYDROCHLORIDE 5 MG: 5 TABLET ORAL at 00:21

## 2023-07-29 ASSESSMENT — PAIN DESCRIPTION - ORIENTATION: ORIENTATION: LEFT

## 2023-07-29 ASSESSMENT — PAIN SCALES - GENERAL: PAINLEVEL_OUTOF10: 9

## 2023-07-29 ASSESSMENT — PAIN DESCRIPTION - LOCATION: LOCATION: ARM

## 2023-07-29 NOTE — ED NOTES
Patient given discharge papers. Patient understanding of discharge.  Patient ambulatory out of ED       Najma Johansen RN  07/29/23 0126

## 2023-07-29 NOTE — ED PROVIDER NOTES
EMERGENCY DEPARTMENT HISTORY AND PHYSICAL EXAM      Patient Name: Kristel Ogden  MRN: [id number]  YOB: 1960  Provider: LOTTIE Olivares  PCP: Clara March DO   Time/Date of evaluation: 8:17 PM EDT on 7/28/23    History of Presenting Illness     Chief Complaint   Patient presents with    Arm Pain    Hand Pain       History Provided By: Patient     History Yesenia Bonilla): Kristel Ogden is a 61 y.o. female with a PMHX of anemia, anxiety, CAD, depression, GERD, migraines, arthritis   who presents to the emergency department  by POV C/O left-sided neck pain that radiates down into the arm wrist and hand as well as some tingling. States she did start having some chest pain since being in the emergency department but no shortness of breath. Denies any injury to the neck or shoulder. She has weakness secondary to pain. She tried taking an oxycodone which seemed like it put her to sleep but had the pain again when she woke back up. No change with exertion or eating. States moving the shoulder or holding her arm in certain positions makes the pain worse. No rashes to the neck or back. States she does have a history of sciatica on the right and this pain does feel similar. She is a former smoker has not smoked in several years. She does not take any aspirin or blood thinners. No blurred vision slurred speech or one-sided weakness. No calf swelling or tenderness.   No history of blood clots in the past.        Past History     Past Medical History:  Past Medical History:   Diagnosis Date    Anemia 1965    Anxiety 2010    on meds    Arthritis 2012    Osteo-legs and wrists    Bipolar disorder ([facility]) 2012    CAD (coronary artery disease) 2005    Chronic pain 2019    legs, low back    Depression     GERD (gastroesophageal reflux disease)     Hypercholesteremia 2012    not on meds currently    Hypertension 2005    Migraine 1972    Sickle cell trait ([facility]) 1960    born with it       Past Surgical 2356 Troponin, High Sensitivity: 8 [HN]      ED Course User Index  [HN] LOTTIE Witt       Medical Decision Making     CC/HPI Summary, DDx, ED Course, and Reassessment:   Case discuss ed with ED attending Suraj Li ed attending, agrees with assessment and tx plan. Patient presents with 5 days of left arm pain and some weakness secondary to pain no injury. Sensation intact distally  strength 5 out of 5. She did complain of some chest pain. EKG showed no ischemic changes and serial enzymes within normal limits. Patient states this feels similar to the type of pain she has with sciatica. Labs within normal limits with the exception of slight hypokalemia and chronic anemia but about at her baseline. Head CT shows no acute process she does have fairly extensive cervical arthritis likely causing radiculopathy. Doubt CVA TIA ACS PE pneumonia epidural abscess or mass. Will replace potassium orally. Pain improved with IV pain medication. Given dose of steroid in ED. Will discharge with Medrol Dosepak muscle relaxer and few stronger pain pills for more severe pain. We will have patient follow-up with orthopedist.  Patient understands and agrees with plan    Disposition Considerations (tests considered but not done, Admit vs D/C, Shared Decision Making, Pt Expectation of Test or Tx.):          Diagnosis and Disposition       1. Cervical radiculopathy    2. Left arm pain    3. Anemia, unspecified type    4.  Hypokalemia        DISPOSITION Decision To Discharge 07/28/2023 11:57:42 PM      PATIENT REFERRED TO:  Belle Golden DO  [address] [address]  [address] [place] [address], [address] [place] [address] EMERGENCY DEPT  [address] [address]  [phone]          DISCHARGE MEDICATIONS:  New Prescriptions    METHOCARBAMOL (ROBAXIN-750) 750 MG TABLET    Take 1 tablet by

## 2023-09-12 ENCOUNTER — OFFICE VISIT (OUTPATIENT)
Age: 63
End: 2023-09-12
Payer: MEDICARE

## 2023-09-12 ENCOUNTER — HOSPITAL ENCOUNTER (OUTPATIENT)
Facility: HOSPITAL | Age: 63
Discharge: HOME | End: 2023-09-15
Payer: MEDICARE

## 2023-09-12 VITALS
HEART RATE: 91 BPM | TEMPERATURE: 98.1 F | SYSTOLIC BLOOD PRESSURE: 128 MMHG | OXYGEN SATURATION: 100 % | BODY MASS INDEX: 30.29 KG/M2 | DIASTOLIC BLOOD PRESSURE: 89 MMHG | WEIGHT: 177.4 LBS | HEIGHT: 64 IN

## 2023-09-12 DIAGNOSIS — K90.9 INTESTINAL MALABSORPTION, UNSPECIFIED TYPE: Primary | ICD-10-CM

## 2023-09-12 DIAGNOSIS — E55.9 VITAMIN D DEFICIENCY, UNSPECIFIED: ICD-10-CM

## 2023-09-12 DIAGNOSIS — K90.9 INTESTINAL MALABSORPTION, UNSPECIFIED TYPE: ICD-10-CM

## 2023-09-12 DIAGNOSIS — D50.9 IRON DEFICIENCY ANEMIA, UNSPECIFIED IRON DEFICIENCY ANEMIA TYPE: ICD-10-CM

## 2023-09-12 DIAGNOSIS — Z98.84 S/P GASTRIC BYPASS: ICD-10-CM

## 2023-09-12 DIAGNOSIS — R13.10 DYSPHAGIA, UNSPECIFIED TYPE: ICD-10-CM

## 2023-09-12 PROBLEM — E66.01 SEVERE OBESITY (BMI 35.0-39.9) WITH COMORBIDITY (HCC): Status: RESOLVED | Noted: 2022-03-01 | Resolved: 2023-09-12

## 2023-09-12 LAB
25(OH)D3 SERPL-MCNC: 40 NG/ML (ref 30–100)
ALBUMIN SERPL-MCNC: 3.3 G/DL (ref 3.4–5)
ALBUMIN/GLOB SERPL: 0.9 (ref 0.8–1.7)
ALP SERPL-CCNC: 80 U/L (ref 45–117)
ALT SERPL-CCNC: 22 U/L (ref 13–56)
ANION GAP SERPL CALC-SCNC: 3 MMOL/L (ref 3–18)
AST SERPL-CCNC: 18 U/L (ref 10–38)
BASOPHILS # BLD: 0 K/UL (ref 0–0.1)
BASOPHILS NFR BLD: 1 % (ref 0–2)
BILIRUB SERPL-MCNC: 0.3 MG/DL (ref 0.2–1)
BUN SERPL-MCNC: 9 MG/DL (ref 7–18)
BUN/CREAT SERPL: 21 (ref 12–20)
CALCIUM SERPL-MCNC: 8.8 MG/DL (ref 8.5–10.1)
CHLORIDE SERPL-SCNC: 108 MMOL/L (ref 100–111)
CO2 SERPL-SCNC: 29 MMOL/L (ref 21–32)
CREAT SERPL-MCNC: 0.43 MG/DL (ref 0.6–1.3)
DIFFERENTIAL METHOD BLD: ABNORMAL
EOSINOPHIL # BLD: 0.2 K/UL (ref 0–0.4)
EOSINOPHIL NFR BLD: 5 % (ref 0–5)
ERYTHROCYTE [DISTWIDTH] IN BLOOD BY AUTOMATED COUNT: 15 % (ref 11.6–14.5)
FERRITIN SERPL-MCNC: 13 NG/ML (ref 8–388)
FOLATE SERPL-MCNC: 10.2 NG/ML (ref 3.1–17.5)
GLOBULIN SER CALC-MCNC: 3.7 G/DL (ref 2–4)
GLUCOSE SERPL-MCNC: 93 MG/DL (ref 74–99)
HCT VFR BLD AUTO: 35.8 % (ref 35–45)
HGB BLD-MCNC: 11.8 G/DL (ref 12–16)
IMM GRANULOCYTES # BLD AUTO: 0 K/UL (ref 0–0.04)
IMM GRANULOCYTES NFR BLD AUTO: 0 % (ref 0–0.5)
IRON SERPL-MCNC: 61 UG/DL (ref 50–175)
LYMPHOCYTES # BLD: 1.9 K/UL (ref 0.9–3.6)
LYMPHOCYTES NFR BLD: 50 % (ref 21–52)
MCH RBC QN AUTO: 26.2 PG (ref 24–34)
MCHC RBC AUTO-ENTMCNC: 33 G/DL (ref 31–37)
MCV RBC AUTO: 79.4 FL (ref 78–100)
MONOCYTES # BLD: 0.4 K/UL (ref 0.05–1.2)
MONOCYTES NFR BLD: 11 % (ref 3–10)
NEUTS SEG # BLD: 1.3 K/UL (ref 1.8–8)
NEUTS SEG NFR BLD: 34 % (ref 40–73)
NRBC # BLD: 0 K/UL (ref 0–0.01)
NRBC BLD-RTO: 0 PER 100 WBC
PLATELET # BLD AUTO: 298 K/UL (ref 135–420)
PMV BLD AUTO: 10.4 FL (ref 9.2–11.8)
POTASSIUM SERPL-SCNC: 4.3 MMOL/L (ref 3.5–5.5)
PROT SERPL-MCNC: 7 G/DL (ref 6.4–8.2)
RBC # BLD AUTO: 4.51 M/UL (ref 4.2–5.3)
SODIUM SERPL-SCNC: 140 MMOL/L (ref 136–145)
VIT B12 SERPL-MCNC: 1215 PG/ML (ref 211–911)
WBC # BLD AUTO: 3.8 K/UL (ref 4.6–13.2)

## 2023-09-12 PROCEDURE — 99214 OFFICE O/P EST MOD 30 MIN: CPT | Performed by: NURSE PRACTITIONER

## 2023-09-12 PROCEDURE — 82607 VITAMIN B-12: CPT

## 2023-09-12 PROCEDURE — 36415 COLL VENOUS BLD VENIPUNCTURE: CPT

## 2023-09-12 PROCEDURE — 3017F COLORECTAL CA SCREEN DOC REV: CPT | Performed by: NURSE PRACTITIONER

## 2023-09-12 PROCEDURE — G8428 CUR MEDS NOT DOCUMENT: HCPCS | Performed by: NURSE PRACTITIONER

## 2023-09-12 PROCEDURE — 3079F DIAST BP 80-89 MM HG: CPT | Performed by: NURSE PRACTITIONER

## 2023-09-12 PROCEDURE — 85025 COMPLETE CBC W/AUTO DIFF WBC: CPT

## 2023-09-12 PROCEDURE — 4004F PT TOBACCO SCREEN RCVD TLK: CPT | Performed by: NURSE PRACTITIONER

## 2023-09-12 PROCEDURE — 82746 ASSAY OF FOLIC ACID SERUM: CPT

## 2023-09-12 PROCEDURE — 82306 VITAMIN D 25 HYDROXY: CPT

## 2023-09-12 PROCEDURE — 82728 ASSAY OF FERRITIN: CPT

## 2023-09-12 PROCEDURE — 3074F SYST BP LT 130 MM HG: CPT | Performed by: NURSE PRACTITIONER

## 2023-09-12 PROCEDURE — G8417 CALC BMI ABV UP PARAM F/U: HCPCS | Performed by: NURSE PRACTITIONER

## 2023-09-12 PROCEDURE — 84425 ASSAY OF VITAMIN B-1: CPT

## 2023-09-12 PROCEDURE — 80053 COMPREHEN METABOLIC PANEL: CPT

## 2023-09-12 PROCEDURE — 83540 ASSAY OF IRON: CPT

## 2023-09-12 RX ORDER — SUCRALFATE 1 G/1
1 TABLET ORAL
Qty: 120 TABLET | Refills: 1 | Status: SHIPPED | OUTPATIENT
Start: 2023-09-12

## 2023-09-12 RX ORDER — SUCRALFATE 1 G/1
1 TABLET ORAL
Qty: 120 TABLET | Refills: 1 | Status: SHIPPED | OUTPATIENT
Start: 2023-09-12 | End: 2023-09-12

## 2023-09-14 LAB — VIT B1 BLD-SCNC: 69.5 NMOL/L (ref 66.5–200)

## 2023-09-20 ENCOUNTER — HOSPITAL ENCOUNTER (OUTPATIENT)
Facility: HOSPITAL | Age: 63
Setting detail: OUTPATIENT SURGERY
Discharge: HOME | End: 2023-09-23
Payer: MEDICARE

## 2023-09-20 ENCOUNTER — HOSPITAL ENCOUNTER (OUTPATIENT)
Facility: HOSPITAL | Age: 63
Discharge: HOME | End: 2023-09-23
Payer: MEDICARE

## 2023-09-20 VITALS
WEIGHT: 178.7 LBS | BODY MASS INDEX: 30.51 KG/M2 | OXYGEN SATURATION: 100 % | SYSTOLIC BLOOD PRESSURE: 122 MMHG | DIASTOLIC BLOOD PRESSURE: 66 MMHG | RESPIRATION RATE: 18 BRPM | HEIGHT: 64 IN | HEART RATE: 66 BPM | TEMPERATURE: 98.2 F

## 2023-09-20 DIAGNOSIS — E66.01 MORBID OBESITY (HCC): ICD-10-CM

## 2023-09-20 PROCEDURE — 74240 X-RAY XM UPR GI TRC 1CNTRST: CPT

## 2023-09-20 PROCEDURE — 74240 X-RAY XM UPR GI TRC 1CNTRST: CPT | Performed by: SPECIALIST

## 2023-09-20 PROCEDURE — 2500000003 HC RX 250 WO HCPCS: Performed by: SPECIALIST

## 2023-09-20 PROCEDURE — 74220 X-RAY XM ESOPHAGUS 1CNTRST: CPT

## 2023-09-20 RX ADMIN — BARIUM SULFATE 100 ML: 960 POWDER, FOR SUSPENSION ORAL at 13:26

## 2023-09-20 ASSESSMENT — PAIN DESCRIPTION - DESCRIPTORS: DESCRIPTORS: ACHING

## 2023-09-20 ASSESSMENT — PAIN - FUNCTIONAL ASSESSMENT: PAIN_FUNCTIONAL_ASSESSMENT: 0-10

## 2023-09-20 NOTE — PROGRESS NOTES
[address] UGI FOCUS NOTE      Date:  9/20/2023  Time:  1:26 PM    Patient:  Maribel Diaz  Procedure:  UGI      Patient Questions  Lap Band Adjustment Patient Questionnaire: If female, are you pregnant?  []Yes     [x]No  Tolerates thick liquids:  []Well   []1     [x]2     []3     []4     []5     []Poorly  Tolerates red meat:  []Well   []1     []2     [x]3     []4     []5     [] Poorly  Tolerates chicken:  []Well   []1     [x]2     []3     []4     []5     []Poorly  Tolerates fish:   []Well   []1     [x]2     []3     []4     []5     []Poorly  Hunger is:   []Well Controlled     []1     [x]2     []3     []4     []5      [] Poorly Controlled  Nightime regurgitation:  []Never     []1     [x]2     []3     []4     []5     []Frequently    Lap Band Info:  Band Type  []Realize     []Realize-C     []AP     []VG     []10cm     []Unknown  []Other      Comments:        Marvin Beavers RN

## 2023-09-20 NOTE — PROCEDURES
[facility]    Upper GI Procedure Report      Kristel Ogden    Medical Record [id number]    1960    Date of Service - September 20, 2023    Pre-Op Diagnosis - patient is status post sleeve to gastric bypass performed by this office 18 months ago with complaint of symptoms of possible return of stricture. They now present for UGI to assess their post surgical anatomy. Post-Op Diagnosis -same    Procedure - UGI study with barium    Surgeon - Lawanda Sigala MD    Assistant - None    Complications - None    Specimens - None    Implants - None    Estimate Blood Loss - None    Statement of Medical Necessity - Need for radiologic evaluation prior to further management of their care. .    Procedure -the patient was brought to the fluoroscopy suite where they were given thin barium. On swallowing the barium the patient was noted to have normal peristalsis of their esophagus with progressive flow into the distal esophagus. Specific findings of the distal esophagus revealed that they did note have a hiatal hernia. Contrast flowed normally through the esophagus and into a properly sized gastric pouch without reflux or obstruction or signs of stricture. The pouch filled in a timely manner and emptied into the zuleyka limb without issue or hesitation. The anatomy was normal for the timeframe with no stricture or obstruction at the anastomosis or any other abnormality. Given the benign findings of today's exam we will continue with conservative treatments.     Lawanda Sigala MD

## 2023-09-25 ENCOUNTER — TELEPHONE (OUTPATIENT)
Age: 63
End: 2023-09-25

## 2023-09-25 NOTE — TELEPHONE ENCOUNTER
Phone call with pt and she asks if she can take methylprednisolone and I told pt that was a steroid and she could not take it, she can take benadryl and use anti itch cream and pt expresses understanding.

## 2024-01-04 RX ORDER — OMEPRAZOLE 20 MG/1
20 CAPSULE, DELAYED RELEASE ORAL DAILY
Qty: 30 CAPSULE | Refills: 5 | Status: SHIPPED | OUTPATIENT
Start: 2024-01-04

## 2024-01-04 RX ORDER — SUCRALFATE 1 G/1
1 TABLET ORAL
Qty: 120 TABLET | Refills: 1 | Status: SHIPPED | OUTPATIENT
Start: 2024-01-04

## 2024-07-12 ENCOUNTER — TELEPHONE (OUTPATIENT)
Age: 64
End: 2024-07-12

## 2024-07-12 NOTE — TELEPHONE ENCOUNTER
LM  that  medication request faxed to pharmacy patient needs appointment prior to refill of medication.

## 2025-02-06 ENCOUNTER — CLINICAL DOCUMENTATION (OUTPATIENT)
Facility: HOSPITAL | Age: 65
End: 2025-02-06

## 2025-07-25 NOTE — PROGRESS NOTES
2025       Swapnil Staples DO  [address]  [facility] [address]  Via In Basket      Patient: Casper Nevarez   YOB: 1948   Date of Visit: 2025       Dear Dr. Staples:    Thank you for referring Casper Nevarez to me for evaluation. Below are my notes for this visit with him.    If you have questions, please do not hesitate to call me. I look forward to following your patient along with you.      Sincerely,        Swapnil Rangel MD        CC: No Recipients  Swapnil Rangel MD  2025  8:47 AM  Signed  CARDIAC ELECTROPHYSIOLOGY OFFICE VISIT      Patient: Casper eNvarez Date of Service: 2025   : 1948      PCP: Swapnil Staples DO     CHIEF COMPLAINT:    Chief Complaint   Patient presents with   • Atrial Fibrillation     Patient has been feeling well mostly - no complaints of palpitations or racing heart. He had an episode 2 weeks ago of almost passing out, not related to change of positions. He sat down for 2 minutes and felt better.    • Office Visit   • Follow-up         HISTORY OF PRESENT ILLNESS   Casper Nevarez is a 76 year old male with history of paroxysmal atrial fibrillation ([id number] = 4 on Eliquis), HTN, DM, ANNIE, BPH, and HLD here for f/u for his afib.  LVEF 53% by TTE 2022 with normal LA size, stress test in  showed to perfusion defects.  Does have some JACOB since a COVID infection in 2023. Not on rate control due to hx of AV node dysfunction (1st degree AV block). All ECGs I see in Epic going back to  shows NSR, was diagnosed with afib while at [facility] with COVID pneumonia in 2023 per notes, scanned telemetry strips from the evening of 23 do appear to show Afib with a slow ventricular rate.      Has reported that his dyspnea is quite severe since his COVID infection, to such a degree that simply walking from his front door to the garbage cans outside winds him. He also reports some episodes of rapid palpitations for 5-10  Jane Knapp  is a 61 y.o. female who presents for follow-up about 6 years following laparoscopic sleeve gastrectomy. She has lost a total of 65 pounds since surgery. Visit Vitals  Ht 5' 4\" (1.626 m)   Wt 83.9 kg (185 lb)   BMI 31.76 kg/m²       Vitamins: MVI - 1-2 X/day, Sublingual B12 - 3/4 days per week, Calcium citrate 1 x/day  - Recommend patient increase calcium citrate to 500 mg - TID, taking dose at least 2 hours from other vitamins and minerals and each dose at least 2 hours apart. Diet History:  Typical intake is as follows:  Breakfast: V8 Juice - walks for 1-1.5  Kaitiln Gory every morning    Lunch: Special K bar, pork rinds, baybel cheese, occasional protein supplement   Dinner: 5 oz baked bbq pork chop with broccoli OR 5 oz baked fish with cooked greens   Snacks: Avoids snacking after dinner  Fluids: V8 juice, 48 oz water      Calories: 600-700   Protein: 35-40 g protein  Carbohydrates:  < 100 g carbohydrate    Exercise:  Do you currently have an exercise routine? Yes - walks ~30 minutes, 1-1.5 miles daily     After reviewing regular intake portions - RD reviewed recommend diet progression following surgery. Reviewed inadequate calorie intake, especially from protein with displacement of nutrient rich foods with high calorie low nutrient convenience options. We reviewed the bariatric plate method for meal planning with portion guide. Assisted with goal setting, provided recommend snack list, easy to prepare high protein, low carbohydrate food choices, provided handout reviewing complex carbohydrate vs simple sugars and reviewed appropriate portion sizes. 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 She was previously snacking on small amounts of these food choices, but more recently has been avoiding and added lock to cabinet and locks after dinner so she is not tempted.  Encouraged compliance with protein supplements, vitamins and mineral minutes a few times a month, but not particularly associated with his dyspnea.  Underwent PFTs on 10/2/23 which showed normal pulmonary function.  Also reports some balance issues which began before COVID but appear worse now.      Most recent TTE 11/20/2023 and his LVEF was 60%, and a 14 day Holter showed rare PACs and PVCs with rare, brief, runs of atrial ectopy but no afib.  Decision made to take a conservative approach without antiarrhythmic therapy or ablation.      Remains in sinus, no significant palpitations. Dyspnea has improved as well.  Having a major oral surgery in September, having some teeth removed.     REVIEW OF SYSTEMS   Constitutional: Negative for fever, chills, change in appetite or fatigue.  Skin: Negative except for HPI  HEENT: Negative except for HPI  Respiratory: Negative shortness of breath.  Cardiovascular: Negative for chest pain, or chest pressure.  Gastrointestinal: Negative for nausea, vomiting, diarrhea, abdominal pain, black or tarry stools.  Genitourinary: Negative except for HPI  Extremities:  Negative except for HPI  Neurologic:  Negative except for HPI  Endocrine: Negative for weight loss or gain.  Hematological: Negative except for HPI  Psychiatric: Negative except for HPI  All other systems are reviewed and are negative except as documented in the HPI.       HISTORIES     ALLERGIES:   Allergen Reactions   • Benazepril Other (See Comments)     Unknown    Sinus infections    \"Sinus infections\"     Current Outpatient Medications   Medication Sig Dispense Refill   • atorvastatin (LIPITOR) 20 MG tablet Take 1 tablet by mouth at bedtime. Stop simvastatin 90 tablet 1   • atorvastatin (LIPITOR) 20 MG tablet TAKE 1 TABLET BY MOUTH AT  BEDTIME STOP SIMVASTATIN 90 tablet 1   • alfuzosin (UROXATRAL) 10 MG 24 hr tablet Take 1 tablet by mouth at bedtime. 90 tablet 1   • apixaBAN (Eliquis) 5 MG Tab Take 1 tablet by mouth every 12 hours. 180 tablet 3   • glipiZIDE (GLUCOTROL) 10 MG tablet Take  supplementations, fluid, and  continuing physical activity. Dietary Instructions     Reviewed intake  Understanding label reading  Understanding low carbohydrates, low sugar, higher protein meals  Understanding proper portions  Instruction given for personal dietary changes  Reviewed portion gudie for month 9+  Comments: Pt given brief pre/post-op diet ed and diet hx reviewed.      Summary:  Pt given brief pre/post-op diet ed and diet hx reviewed. Pt instructed to follow a low calorie, low carbohydrate, high protein diet to promote optimal weight loss. Provided food list, example menu, diet progression guide with recommended portions. Contact information provided for any further questions or concerns.     Signed By: Earlene Long     October 14, 2019 1 tablet by mouth in the morning and 1 tablet in the evening. Take before meals. 180 tablet 3   • metFORMIN (GLUCOPHAGE-XR) 500 MG 24 hr tablet Take 2 tablets by mouth in the morning and 2 tablets in the evening. 360 tablet 3   • empagliflozin (Jardiance) 25 MG tablet Take 1 tablet by mouth daily (before breakfast). 90 tablet 3   • losartan (COZAAR) 25 MG tablet Take 1 tablet by mouth daily. Stop  olmesartan 90 tablet 3   • gabapentin (NEURONTIN) 100 MG capsule Take 2 capsules by mouth at bedtime.     • Semaglutide, 2 MG/DOSE, 8 MG/3ML Solution Pen-injector Inject 2 mg into the skin every 7 days. Indications: Type 2 Diabetes     • finasteride (PROSCAR) 5 MG tablet Take 1 tablet by mouth daily. 90 tablet 3   • omeprazole (PrilOSEC) 20 MG capsule TAKE 1 CAPSULE BY MOUTH IN THE  MORNING AND 1 CAPSULE BY MOUTH  IN THE EVENING 180 capsule 3   • Omega 3 1200 MG Cap Take 1,200 mg by mouth daily.     • diclofenac (VOLTAREN) 1 % gel Apply 4 g topically 4 times daily. 100 g 1   • Cholecalciferol (Vitamin D) 50 mcg (2,000 units) tablet Take 50 mcg by mouth daily.     • Cyanocobalamin (VITAMIN B 12 PO) Take 1 tablet by mouth daily.     • Multiple Vitamin (MULTIVITAMIN ADULT PO) Take 1 tablet by mouth daily.       No current facility-administered medications for this visit.     Past Medical History:   Diagnosis Date   • AF (atrial fibrillation)  (CMD)    • Anemia    • Anxiety disorder    • Arthritis    • Attention deficit disorder    • Singleton's esophagus    • Bladder calculus    • BPH associated with nocturia    • Cataract    • Chronic pain disorder    • Colon polyps    • COPD (chronic obstructive pulmonary disease)  (CMD)    • Depressive disorder    • Dry eye syndrome of bilateral lacrimal glands    • GERD (gastroesophageal reflux disease)    • Hiatal hernia    • High cholesterol    • HTN (hypertension)    • Kidney disease    • Mild asthma (CMD)    • ANNIE on CPAP    • Overactive bladder    • Peripheral neuropathy     Hands   •  Sigmoid diverticulosis    • Sleep apnea     Does not use CPAP   • T2DM (type 2 diabetes mellitus)  (CMD)    • Varicose vein of leg      Past Surgical History:   Procedure Laterality Date   • Carpal tunnel release Bilateral 1995   • Cholecystectomy     • Colon surgery     • Colonoscopy  2003    colon polyps   • Colonoscopy  01/2017   • Colonoscopy w/ polypectomy  05/02/2022    hyperplastic on pathology   • Gastric bypass  1985   • Heel spur excision Left    • No past surgeries     • Prostate surgery     • Shoulder surg proc unlisted Left     Removal of bone spur.   • Shoulder surg proc unlisted Right     Removal of bone spur   • Transurethral elec-surg prostatectom  2005     Social History     Tobacco Use   • Smoking status: Never     Passive exposure: Never   • Smokeless tobacco: Never   Vaping Use   • Vaping status: never used   Substance Use Topics   • Alcohol use: Not Currently     Comment: seldom   • Drug use: Never     Family History   Problem Relation Age of Onset   • Cancer, Breast Mother    • Pulmonary embolism Father    • Diabetes Sister    • Cancer, Breast Sister    • Other Sister         clotting disorder   • Diabetes Sister    • Patient is unaware of any medical problems Sister    • Cancer, Prostate Brother    • Hypertension Brother    • Leukemia Brother    • Sepsis Brother    • Diabetes Brother    • COPD Brother    • Amblyopia Other    • Early death Sister         heart valve calcify   • Hearing Loss Sister         age   • Diabetes Brother    • Early death Brother         sepsis   • Diabetes Brother    • Early death Brother         cancer   • Early death Mother         cancer   • Early death Brother         cancer   • Hearing Loss Brother         meningitis       I have reviewed the past medical history, family history, social history, medications and allergies listed in the medical record as obtained by my nursing staff and support staff and agree with their documentation.    Medications     Current  Outpatient Medications   Medication Sig   • atorvastatin (LIPITOR) 20 MG tablet Take 1 tablet by mouth at bedtime. Stop simvastatin   • atorvastatin (LIPITOR) 20 MG tablet TAKE 1 TABLET BY MOUTH AT  BEDTIME STOP SIMVASTATIN   • alfuzosin (UROXATRAL) 10 MG 24 hr tablet Take 1 tablet by mouth at bedtime.   • apixaBAN (Eliquis) 5 MG Tab Take 1 tablet by mouth every 12 hours.   • glipiZIDE (GLUCOTROL) 10 MG tablet Take 1 tablet by mouth in the morning and 1 tablet in the evening. Take before meals.   • metFORMIN (GLUCOPHAGE-XR) 500 MG 24 hr tablet Take 2 tablets by mouth in the morning and 2 tablets in the evening.   • empagliflozin (Jardiance) 25 MG tablet Take 1 tablet by mouth daily (before breakfast).   • losartan (COZAAR) 25 MG tablet Take 1 tablet by mouth daily. Stop  olmesartan   • gabapentin (NEURONTIN) 100 MG capsule Take 2 capsules by mouth at bedtime.   • Semaglutide, 2 MG/DOSE, 8 MG/3ML Solution Pen-injector Inject 2 mg into the skin every 7 days. Indications: Type 2 Diabetes   • finasteride (PROSCAR) 5 MG tablet Take 1 tablet by mouth daily.   • omeprazole (PrilOSEC) 20 MG capsule TAKE 1 CAPSULE BY MOUTH IN THE  MORNING AND 1 CAPSULE BY MOUTH  IN THE EVENING   • Omega 3 1200 MG Cap Take 1,200 mg by mouth daily.   • diclofenac (VOLTAREN) 1 % gel Apply 4 g topically 4 times daily.   • Cholecalciferol (Vitamin D) 50 mcg (2,000 units) tablet Take 50 mcg by mouth daily.   • Cyanocobalamin (VITAMIN B 12 PO) Take 1 tablet by mouth daily.   • Multiple Vitamin (MULTIVITAMIN ADULT PO) Take 1 tablet by mouth daily.     No current facility-administered medications for this visit.        PHYSICAL EXAM   Vital Signs:    Vitals:    07/25/25 0835   BP: 110/76   BP Location: RUE - Right upper extremity   Patient Position: Sitting   Pulse: 75   Weight: 122.5 kg (270 lb)   Height: 6' (1.829 m)     General: Well developed, well nourished, non-toxic appearing  HEENT: NCAT, OP clear, no JVD, sclera anicteric   CV: RRR, normal  s1 s2, no s3 s4, no murmurs appreciated, warm well perfused  Pulm: CTAB, no wheeze, no stridor  Abd: Soft, non-tender, no HSM, +BS  Ext: warm, well perfused, no edema, 2+ distal pulses  Neuro: normal tone, and coordination, alert and oriented x3  Skin: no skin breakdown noted, no rashes    LABORATORY DATA     Sodium   Date Value Ref Range Status   04/28/2025 139 135 - 145 mmol/L Final   01/08/2025 140 135 - 145 mmol/L Final     Potassium   Date Value Ref Range Status   04/28/2025 4.8 3.4 - 5.1 mmol/L Final     Comment:     Slight hemolysis, result may be falsely increased.   01/08/2025 4.0 3.4 - 5.1 mmol/L Final     Chloride   Date Value Ref Range Status   04/28/2025 107 97 - 110 mmol/L Final   01/08/2025 105 97 - 110 mmol/L Final     Carbon Dioxide   Date Value Ref Range Status   04/28/2025 24 21 - 32 mmol/L Final   01/08/2025 29 21 - 32 mmol/L Final     Anion Gap   Date Value Ref Range Status   04/28/2025 13 7 - 19 mmol/L Final   01/08/2025 10 7 - 19 mmol/L Final     Glucose   Date Value Ref Range Status   04/28/2025 178 (H) 70 - 99 mg/dL Final   01/08/2025 131 (H) 70 - 99 mg/dL Final     BUN   Date Value Ref Range Status   04/28/2025 12 6 - 20 mg/dL Final   01/08/2025 12 6 - 20 mg/dL Final     Creatinine   Date Value Ref Range Status   04/28/2025 0.96 0.67 - 1.17 mg/dL Final   01/08/2025 1.02 0.67 - 1.17 mg/dL Final     GFR Estimate,    Date Value Ref Range Status   11/03/2020 85  Final     Comment:     eGFR 60 - 89 mL/min/1.73m2 = Mild decrease in kidney function.   02/10/2020 83  Final     Comment:     eGFR 60 - 89 mL/min/1.73m2 = Mild decrease in kidney function.     GFR Estimate, Non    Date Value Ref Range Status   11/03/2020 74  Final     Comment:     eGFR 60 - 89 mL/min/1.73m2 = Mild decrease in kidney function.   02/10/2020 72  Final     Comment:     eGFR 60 - 89 mL/min/1.73m2 = Mild decrease in kidney function.     BUN/Creatinine Ratio   Date Value Ref Range Status    2020 14 7 - 25 Final   02/10/2020 12 7 - 25 Final     Calcium   Date Value Ref Range Status   2025 9.2 8.4 - 10.2 mg/dL Final   2025 9.3 8.4 - 10.2 mg/dL Final     Bilirubin, Total   Date Value Ref Range Status   2025 0.7 0.2 - 1.0 mg/dL Final   2025 0.6 0.2 - 1.0 mg/dL Final     GOT/AST   Date Value Ref Range Status   2025 43 (H) <=37 Units/L Final     Comment:     Slight hemolysis, result may be falsely increased.   2025 38 (H) <=37 Units/L Final     GPT/ALT   Date Value Ref Range Status   2025 45 <64 Units/L Final   2025 37 <64 Units/L Final     Alkaline Phosphatase   Date Value Ref Range Status   2025 114 45 - 117 Units/L Final   2025 112 45 - 117 Units/L Final     Protein, Total   Date Value Ref Range Status   2025 6.9 6.4 - 8.2 g/dL Final   2025 7.0 6.4 - 8.2 g/dL Final     Albumin   Date Value Ref Range Status   2025 3.8 3.4 - 5.0 g/dL Final   2025 3.9 3.4 - 5.0 g/dL Final     Globulin   Date Value Ref Range Status   2025 3.1 2.0 - 4.0 g/dL Final   2025 3.1 2.0 - 4.0 g/dL Final     A/G Ratio   Date Value Ref Range Status   2025 1.2 1.0 - 2.4 Final   2025 1.3 1.0 - 2.4 Final     FASTING STATUS   Date Value Ref Range Status   2020 12 hrs Final       DIAGNOSTIC IMAGING   Results for orders placed during the hospital encounter of 23    Transthoracic Echo (TTE) Complete    Impression  *[facility]*  [address] [place] [address]  Transthoracic Echocardiogram (TTE)    Patient: Casper Nevarez     Study Date/Time:         2023 7:55AM  MRN:     [id number]            FIN#:                    [id number]  :     1948         Ht/Wt:                   182.9cm 143.8kg  Age:     74                 BSA/BMI:                 2.77m^2 43kg/m^2  Gender:  M                  Baseline BP:             100 / 86  Ordering Physician:   Swapnil Rangel,  MD    Referring Physician:  MD Swapnil Keen MD    Attending Physician:  Swapnil Rangel MD  Performing Physician: AMG  Diagnostic Physician: Ravi Perry MD  Sonographer:          Dorothy Rushing    ------------------------------------------------------------------------------  STUDY CONCLUSIONS  SUMMARY:    1. Left ventricle: The cavity size is normal. Wall thickness is normal.  Systolic function is normal. The ejection fraction was measured by visual  estimation. The ejection fraction is 60%.  2. Right ventricle: The cavity size is normal. Wall thickness is normal.  Systolic function is normal.    ------------------------------------------------------------------------------  STUDY DATA:   Procedure:  A transthoracic echocardiogram was performed. Image  quality was adequate. The study was technically limited due to body habitus.  M-mode, complete 2D, complete spectral Doppler, and color Doppler.  Study  status:  Routine.  Study completion:  There were no complications.    FINDINGS    LEFT VENTRICLE:  The cavity size is normal. Wall thickness is normal. There is  no evidence of hypertrophy. Systolic function is normal. Wall motion is  normal; there are no regional wall motion abnormalities.    The ejection  fraction was measured by visual estimation. The ejection fraction is 60%. Left  ventricular diastolic function parameters are normal.    AORTIC VALVE:  Poorly visualized. The annulus is normal. The valve is  structurally normal. The valve is trileaflet. The leaflets are normal  thickness. Cusp separation is normal. Velocity is within the normal range.  There is no stenosis. There is no regurgitation.    AORTA:  Aortic root: The root is normal-sized. The root is normal-sized.    MITRAL VALVE:  The valve is structurally normal. The annulus is normal. The  leaflets are normal thickness. Leaflet separation is normal. Inflow velocity  is within the normal range. There is no evidence for stenosis. There  is no  regurgitation.    LEFT ATRIUM:  The atrium is normal in size.    RIGHT VENTRICLE:  The cavity size is normal. Wall thickness is normal.  Systolic function is normal.    VENTRICULAR SEPTUM:   Thickness is normal.  Normal septal motion.    Normal  contour.    There is no evidence of a ventricular septal defect.    PULMONIC VALVE:   Not well visualized. The leaflets are normal thickness.  There is no significant regurgitation.    TRICUSPID VALVE:  The valve is structurally normal. Leaflet separation is  normal. Inflow velocity is within the normal range. There is no evidence for  stenosis. There is no regurgitation.    RIGHT ATRIUM:  The atrium is normal in size.    PERICARDIUM:   There is no pericardial effusion.    SYSTEMIC VEINS:  Inferior vena cava: The IVC is normal-sized.  Respirophasic diameter changes  are in the normal range (>= 50%).    ------------------------------------------------------------------------------  Measurements    Left ventricle         Value        Ref       08/18/2022  Right ventricle          Value           Ref       08/18/2022  TARAS, LAX chord     (L) 3.5   cm     4.2 - 5.8 6.3         TARAS, LAX                 2.1    cm       --------- 3.9  ESD, LAX chord     (N) 2.6   cm     2.5 - 4.0 4.5  TARAS/bsa, LAX chord (L) 1.3   cm/m^2 2.2 - 3.0 2.3         Left atrium              Value           Ref       08/18/2022  ESD/bsa, LAX chord (L) 0.9   cm/m^2 1.3 - 2.1 1.6         AP dim, ES           (N) 3.7    cm       3.0 - 4.0 4.4  PW, ED, LAX        (H) 1.5   cm     0.6 - 1.0 1.0         AP dim index, ES     (L) 1.4    cm/m^2   1.5 - 2.3 ----------  IVS, ED            (H) 1.5   cm     0.6 - 1.0 1.1         Area ES, A4C         (N) 15     cm^2     <=20      21  PW, ED             (H) 1.5   cm     0.6 - 1.0 1.0         Area/bsa ES, A4C         5.42   cm^2/m^2 --------- ----------  IVS/PW, ED             0.98         --------- 1.05        Vol, ES, 1-p A4C     (N) 36     ml       18 - 58    63  EDV                (L) 52    ml     62 - 150  245         Vol/bsa, ES, 1-p A4C (N) 13     ml/m^2   12 - 37   23  ESV                (N) 24    ml     21 - 61   90  EF                 (N) 60    %      52 - 72   53          Mitral valve             Value           Ref       2022  SV                     28    ml     --------- 107         Peak E                   0.41   m/sec    --------- 0.49  EDV/bsa            (L) 19    ml/m^2 34 - 74   90          Peak A                   0.72   m/sec    --------- 0.75  ESV/bsa            (L) 8     ml/m^2 11 - 31   33          Decel slope              286.14 cm/s^2   --------- ----------  SV/bsa                 10    ml/m^2 --------- 39          Decel time               144    ms       --------- 366  ESV, 1-p A4C       (N) 49    ml     22 - 78   ----------  Peak E/A ratio           0.57            --------- ----------  SV, 1-p A4C            27    ml     --------- 41  ESV/bsa, 1-p A4C   (N) 18    ml/m^2 12 - 40   ----------  Aortic root              Value           Ref       2022  SV/bsa, 1-p A4C        10    ml/m^2 --------- 15          Root diam, ED        (N) 3.7    cm       3.2 - 4.7 ----------  EDV, 2-p           (N) 76    ml     62 - 150  ----------  EDV/bsa, 2-p       (L) 27    ml/m^2 34 - 74   ----------  Legend:  (L)  and  (H)  brandon values outside specified reference range.    (N)  marks values inside specified reference range.    Prepared and electronically signed by  Ravi Perry MD  2023 08:52    Results for orders placed in visit on 22    Stress Test with Myocardial Perfusion    Impression  [facility]  [address] [place] [address]  [phone]  Myocardial Perfusion Imaging    Regadenoson (Lexiscan)    Rest/Stress    Patient: Casper Nevarez   Study Date/Time:      Aug 18 2022 12:00PM  MRN:     [id number]          FIN#:                 [id number]  :     1948       Ht/Wt:                182.9cm  163.2kg  Age:     73               BSA/BMI:              2.96m^2 48.8kg/m^2  Gender:  M                Baseline BP:          90 / 60  Ordering Physician:    Swapnil Staples  Referring Physician:   Swapnil Staples William G    Diagnostic Physician:  Juan Carlos Clemente MD    --------------------------------------------------------------------------    --------------------------------------------------------------------------  Indications:   Chest pain.    --------------------------------------------------------------------------  Study Conclusions    Summary:    1. Myocardial perfusion imaging: Left ventricular size is normal. There is  no transient ischemic dilation of the left ventricle during stress.  Myocardial perfusion study is normal.  2. Gated SPECT: The calculated left ventricular ejection fraction is 65%.  3. Stress ECG conclusions: The stress ECG is negative for ischemia.  4. Baseline ECG: Normal sinus rhythm.    --------------------------------------------------------------------------  Study data:   Nuclear components:    Rest/stress imaging.  Study status:  Routine.  Location:  Nuclear laboratory.  Patient status:  Outpatient.  Consent:  The risks, benefits, and alternatives to the procedure were  explained to the patient.  Study completion:  The patient tolerated the  procedure well.    --------------------------------------------------------------------------  Procedure data:  Initial setup. The patient was brought to the laboratory.  A baseline ECG was recorded. Intravenous access was obtained. Surface ECG  leads and manual cuff blood pressure measurements were monitored.  Regadenoson (Lexiscan) stress test. Regadenoson (Lexiscan) was  administered by intravenous bolus, followed by a 5ml saline flush. The  total dose was 0.4mgover 10.00sec.    --------------------------------------------------------------------------  Isotope  administration:    +-------------+----------------+----------------+  !Stage        !Rest            !Stress          !  +-------------+----------------+----------------+  !Agent        !Tc-99m sestamibi!Tc-99m sestamibi!  +-------------+----------------+----------------+  !Injected dose!33mCi           !31.5mCi         !  +-------------+----------------+----------------+  !Route        !IV              !IV              !  +-------------+----------------+----------------+  Image properties:   Gated imaging was performed.    --------------------------------------------------------------------------  Baseline ECG:   Normal sinus rhythm.    --------------------------------------------------------------------------  Cardiac stress table:    +-----------------------------+--+----------+------------+  !Stage                        !HR!BP        !Symptoms    !  +-----------------------------+--+----------+------------+  !Baseline                     !68!90/60 (70)!No symptoms.!  +-----------------------------+--+----------+------------+  !Regadenoson (Lexiscan); 2 min!82!90/60 (70)!------------!  +-----------------------------+--+----------+------------+  !Recovery; 2 min              !75!90/60 (70)!------------!  +-----------------------------+--+----------+------------+  Stress results:   Maximal heart rate during stress was 82bpm (56% of  maximal predicted heart rate). The maximal predicted heart rate was  147bpm.The target heart rate was 125bpm. The rate-pressure product for the  peak heart rate and blood pressure was 7380mm Hg/min.  Stress testing did  not produce any symptoms suggestive of coronary artery disease.  Stress ECG:   The stress ECG is negative for ischemia.    --------------------------------------------------------------------------  Myocardial perfusion imaging:   Left ventricular size is normal. There is  no transient ischemic dilation of the left ventricle during stress.  Myocardial perfusion study is  normal.  Gated SPECT:   The calculated left ventricular ejection fraction is 65%.  Prepared and electronically signed by:    Juan Carlos Clemente MD  08/19/2022 12:58    No results found for this or any previous visit.    No orders to display       EKG (visualized and independently interpreted): NSR, 1st degree AV block    ASSESSMENT & PLAN     #paroxysmal atrial fibrillation ([id number] = 4 on Eliquis)  # HTN  # DM  #ANNIE  #HLD     Mr Nevarez does not appear to be having any significant afib recurrences. Prefers to avoid antiarrhythmic medications, no need for catheter ablation at this point. Will continue with anticoagulation alone and monitor for symptomatic recurrence.  - Continue Eliquis   - OK to hold Eliquis 48 hours before or after his oral surgery    RTC 1 year    Instructions provided as documented in the after visit summary.    The patient and wife indicated understanding of the diagnosis and agreed with the plan of care.    AZAEL Rangel MD  Electrophysiology

## (undated) DEVICE — GLOVE SURG SZ 85 L12IN FNGR ORTHO 126MIL CRM LTX FREE

## (undated) DEVICE — GARMENT,MEDLINE,DVT,INT,CALF,MED, GEN2: Brand: MEDLINE

## (undated) DEVICE — NDL SPNE QNCKE 18GX3.5IN LF --

## (undated) DEVICE — HANDPIECE SET WITH HIGH FLOW TIP AND SUCTION TUBE: Brand: INTERPULSE

## (undated) DEVICE — INTENDED FOR TISSUE SEPARATION, AND OTHER PROCEDURES THAT REQUIRE A SHARP SURGICAL BLADE TO PUNCTURE OR CUT.: Brand: BARD-PARKER SAFETY BLADES SIZE 10, STERILE

## (undated) DEVICE — SOLUTION IRRIG 3000ML LAC R FLX CONT

## (undated) DEVICE — GLOVE SURG SZ 65 THK91MIL LTX FREE SYN POLYISOPRENE

## (undated) DEVICE — [HIGH FLOW INSUFFLATOR,  DO NOT USE IF PACKAGE IS DAMAGED,  KEEP DRY,  KEEP AWAY FROM SUNLIGHT,  PROTECT FROM HEAT AND RADIOACTIVE SOURCES.]: Brand: PNEUMOSURE

## (undated) DEVICE — SHEAR HARMONIC 5MMX45CM -- ACE 7+

## (undated) DEVICE — GOWN ISOLATN REG BLU POLY UNISX W/ THMB LOOP

## (undated) DEVICE — TROCAR ENDOSCP L100MM DIA15MM BLDELSS STBL SL ENDOPATH XCEL

## (undated) DEVICE — PIN DRL QUIK HI PERF FOR SIG SYS

## (undated) DEVICE — ENDOCUT SCISSOR TIP, DISPOSABLE: Brand: RENEW

## (undated) DEVICE — TOTAL TRAY, 16FR 10ML SIL FOLEY, URN: Brand: MEDLINE

## (undated) DEVICE — NEEDLE SPNL 20GA L3.5IN YEL HUB S STL REG WALL FIT STYL W/

## (undated) DEVICE — GLOVE ORANGE PI 7 1/2   MSG9075

## (undated) DEVICE — THE CANADY HYBRID PLASMA SCALPEL IS AN ELECTROSURGICAL PLASMA SCALPEL THAT USES AN 85MM BENDABLE PADDLE BLADE TIP. THE ELECTROSURGICAL PLASMA SCALPEL IS USED TO SIMULTANEOUSLY CUT AND COAGULATE BIOLOGICAL TISSUE.: Brand: CANADY HYBRID PLASMA PADDLE BLADE

## (undated) DEVICE — SOLUTION LACTATED RINGERS INJECTION USP

## (undated) DEVICE — TROCAR LAP L100MM DIA5MM BLDELSS W/ STBL SL ENDOPATH XCEL

## (undated) DEVICE — CUTTER ENDOSCP L340MM LIN ARTC SGL STROKE FIRING ENDOPATH

## (undated) DEVICE — HOOD, PEEL-AWAY: Brand: FLYTE

## (undated) DEVICE — Device

## (undated) DEVICE — SUTURE PDS II SZ 0 L27IN ABSRB VLT L26MM CT-2 1/2 CIR Z334H

## (undated) DEVICE — SYR LR LCK 1ML GRAD NSAF 30ML --

## (undated) DEVICE — GLOVE ORANGE PI 8   MSG9080

## (undated) DEVICE — AGENT HEMSTAT W6XL9IN OXIDIZED REGENERATED CELOS ABSRB FOR

## (undated) DEVICE — STERILE POLYISOPRENE POWDER-FREE SURGICAL GLOVES: Brand: PROTEXIS

## (undated) DEVICE — SOL IRRIGATION INJ NACL 0.9% 500ML BTL

## (undated) DEVICE — 3M™ STERI-DRAPE™ U-DRAPE 1015: Brand: STERI-DRAPE™

## (undated) DEVICE — BARIATRIC: Brand: MEDLINE INDUSTRIES, INC.

## (undated) DEVICE — SOLUTION SURG PREP 26 CC PURPREP

## (undated) DEVICE — ESOPHAGEAL BALLOON DILATATION CATHETER: Brand: CRE FIXED WIRE

## (undated) DEVICE — IMMOBILIZER KNEE UNIV L19IN FOR 12-24IN THGH FOAM T BAR

## (undated) DEVICE — STRIP SKIN CLSR W1XL5IN NYL REINF CURAD

## (undated) DEVICE — SUTURE ETHLN SZ 3-0 L30IN NONABSORBABLE BLK FSL L30MM 3/8 1671H

## (undated) DEVICE — YANKAUER,FLEXIBLE HANDLE,REGLR CAPACITY: Brand: MEDLINE INDUSTRIES, INC.

## (undated) DEVICE — TROCAR ENDOSCP L100MM DIA12MM STBL SL BLDELSS ENDOPATH XCEL

## (undated) DEVICE — SUT PROL 2-0 30IN CT2 BLU --

## (undated) DEVICE — CLIP SUT ENDOSCP F/2-0/3-0/4-0 -- LAPRA-TY

## (undated) DEVICE — SUT ETHLN 3-0 18IN PS1 BLK --

## (undated) DEVICE — UNDERCAST PADDING: Brand: DEROYAL

## (undated) DEVICE — TUBING, SUCTION, 1/4" X 12', STRAIGHT: Brand: MEDLINE

## (undated) DEVICE — RELOAD STPL L60MM H1-2.6MM MESENTERY THN TISS WHT 6 ROW

## (undated) DEVICE — RESERVOIR,SUCTION,100CC,SILICONE: Brand: MEDLINE

## (undated) DEVICE — SUT MONOCRYL PLUS UD 4-0 --

## (undated) DEVICE — DEVICE INFL 60ML 12ATM CONVENIENT LOK REL HNDL HI PRSS FLX

## (undated) DEVICE — TOWEL,OR,DSP,ST,BLUE,STD,4/PK,20PK/CS: Brand: MEDLINE

## (undated) DEVICE — GARMENT COMPR M FOR 13IN FT INTMIT SGL BLDR HEM FORC II

## (undated) DEVICE — OPTIFOAM GENTLE SA, POSTOP, 4X12: Brand: MEDLINE

## (undated) DEVICE — SLEEVE TRCR L100MM DIA5MM UNIV STBL FOR BLDELSS DIL TIP

## (undated) DEVICE — PREP SKN CHLRAPRP APL 26ML STR --

## (undated) DEVICE — PACK PROCEDURE SURG TOT KNEE CUST

## (undated) DEVICE — CEMENT BNE 20GM HALF DOSE PMMA VISC RADPQ FAST: Type: IMPLANTABLE DEVICE | Site: KNEE | Status: NON-FUNCTIONAL

## (undated) DEVICE — SPONGE GZ W4XL4IN COT 12 PLY TYP VII WVN C FLD DSGN

## (undated) DEVICE — TROCAR ENDOSCP BLDELSS 12X100 MM W/ HNDL STBL SL OPT TIP

## (undated) DEVICE — MOUTHPIECE ENDOSCP 20X27MM --

## (undated) DEVICE — STAPLER INT L37CM STPL 21MM CIR ENDOSCP CRV INTLUMN B FRM

## (undated) DEVICE — MASK O2 O2 LN L7FT CO2 LN L10FT FEM BG 750ML M CONC ADPT

## (undated) DEVICE — APPLICATOR LAP 35 CM 2 RIGID VISTASEAL

## (undated) DEVICE — NEEDLE INSUF L150MM DIA2MM DISP FOR PNEUMOPERI ENDOPATH

## (undated) DEVICE — SEALANT TISS 10 CC FOR HUM FIBRIN VISTASEAL

## (undated) DEVICE — DISPOSABLE SUCTION/IRRIGATOR TUBE SET WITH TIP: Brand: AHTO

## (undated) DEVICE — SUTURE ETHIB EXCL BR GRN TAPR PT 2-0 30 X563H X563H

## (undated) DEVICE — ADAPTER FEM +2/-2MM OFFSET BOLT PFC SIG: Type: IMPLANTABLE DEVICE | Site: KNEE | Status: NON-FUNCTIONAL

## (undated) DEVICE — FORCEPS BX OVL CUP SERR DISP CAP L 240CM RAD JAW 4

## (undated) DEVICE — GLOVE ORANGE PI 8 1/2   MSG9085

## (undated) DEVICE — STRAP,POSITIONING,KNEE/BODY,FOAM,4X60": Brand: MEDLINE

## (undated) DEVICE — GLOVE SURG SZ 65 L12IN FNGR THK79MIL GRN LTX FREE

## (undated) DEVICE — VISUALIZATION SYSTEM: Brand: CLEARIFY

## (undated) DEVICE — SYSTEM SKIN CLSR 22CM DERMBND PRINEO

## (undated) DEVICE — RELOAD STPL H1.8-3.8MM REG THCK TISS G 6 ROW GRIPPING SURF

## (undated) DEVICE — STAPLER SKIN L440MM 32MM LNG 12 FIRING B FRM PWR + GRIPPING

## (undated) DEVICE — APPLIER CLP L SHFT DIA12MM 20 ROT MULT LIGACLP

## (undated) DEVICE — PAD,ABDOMINAL,5"X9",ST,LF,25/BX: Brand: MEDLINE INDUSTRIES, INC.

## (undated) DEVICE — RELOAD STPL H1-2.5X45MM VASC THN TISS WHT 6 ROW B FRM SGL

## (undated) DEVICE — SUTURE PDS + SZ 1 L96IN ABSRB VLT L65MM TP-1 1/2 CIR PDP880G

## (undated) DEVICE — DRESSING,GAUZE,XEROFORM,CURAD,5"X9",ST: Brand: CURAD

## (undated) DEVICE — TISSUE RETRIEVAL SYSTEM: Brand: INZII RETRIEVAL SYSTEM